# Patient Record
Sex: MALE | Race: WHITE | NOT HISPANIC OR LATINO
[De-identification: names, ages, dates, MRNs, and addresses within clinical notes are randomized per-mention and may not be internally consistent; named-entity substitution may affect disease eponyms.]

---

## 2017-10-09 ENCOUNTER — APPOINTMENT (OUTPATIENT)
Dept: ORTHOPEDIC SURGERY | Facility: CLINIC | Age: 72
End: 2017-10-09
Payer: COMMERCIAL

## 2017-10-09 VITALS
BODY MASS INDEX: 27.83 KG/M2 | HEART RATE: 58 BPM | SYSTOLIC BLOOD PRESSURE: 167 MMHG | HEIGHT: 73 IN | WEIGHT: 210 LBS | DIASTOLIC BLOOD PRESSURE: 85 MMHG

## 2017-10-09 PROCEDURE — 99214 OFFICE O/P EST MOD 30 MIN: CPT

## 2017-10-09 PROCEDURE — 99204 OFFICE O/P NEW MOD 45 MIN: CPT

## 2017-10-16 ENCOUNTER — APPOINTMENT (OUTPATIENT)
Dept: ORTHOPEDIC SURGERY | Facility: CLINIC | Age: 72
End: 2017-10-16
Payer: COMMERCIAL

## 2017-10-16 VITALS
HEART RATE: 54 BPM | BODY MASS INDEX: 31.21 KG/M2 | WEIGHT: 218 LBS | DIASTOLIC BLOOD PRESSURE: 86 MMHG | SYSTOLIC BLOOD PRESSURE: 172 MMHG | HEIGHT: 70 IN

## 2017-10-16 DIAGNOSIS — M51.26 OTHER INTERVERTEBRAL DISC DISPLACEMENT, LUMBAR REGION: ICD-10-CM

## 2017-10-16 PROCEDURE — 99213 OFFICE O/P EST LOW 20 MIN: CPT

## 2018-12-13 ENCOUNTER — APPOINTMENT (OUTPATIENT)
Dept: ORTHOPEDIC SURGERY | Facility: CLINIC | Age: 73
End: 2018-12-13
Payer: COMMERCIAL

## 2018-12-13 VITALS
WEIGHT: 224 LBS | BODY MASS INDEX: 32.07 KG/M2 | HEART RATE: 59 BPM | HEIGHT: 70 IN | DIASTOLIC BLOOD PRESSURE: 64 MMHG | SYSTOLIC BLOOD PRESSURE: 141 MMHG

## 2018-12-13 PROCEDURE — 73562 X-RAY EXAM OF KNEE 3: CPT | Mod: RT

## 2018-12-13 PROCEDURE — 20610 DRAIN/INJ JOINT/BURSA W/O US: CPT | Mod: RT

## 2018-12-13 PROCEDURE — 99214 OFFICE O/P EST MOD 30 MIN: CPT | Mod: 25

## 2018-12-28 ENCOUNTER — CHART COPY (OUTPATIENT)
Age: 73
End: 2018-12-28

## 2019-08-26 ENCOUNTER — APPOINTMENT (OUTPATIENT)
Dept: NEUROLOGY | Facility: CLINIC | Age: 74
End: 2019-08-26
Payer: COMMERCIAL

## 2019-08-26 VITALS
HEART RATE: 60 BPM | HEIGHT: 70 IN | OXYGEN SATURATION: 98 % | DIASTOLIC BLOOD PRESSURE: 70 MMHG | SYSTOLIC BLOOD PRESSURE: 140 MMHG

## 2019-08-26 DIAGNOSIS — H81.4 VERTIGO OF CENTRAL ORIGIN: ICD-10-CM

## 2019-08-26 PROCEDURE — 99203 OFFICE O/P NEW LOW 30 MIN: CPT

## 2019-08-26 NOTE — HISTORY OF PRESENT ILLNESS
[FreeTextEntry1] : &4 year old male with unsteady gait and neck and low back discomfort \par \par  Tendency to fall - s/p multiple otho proceeders -\par  Hear ruled no to be the cause

## 2019-08-26 NOTE — PHYSICAL EXAM
[General Appearance - Alert] : alert [General Appearance - In No Acute Distress] : in no acute distress [Affect] : the affect was normal [Impaired Insight] : insight and judgment were intact [Oriented To Time, Place, And Person] : oriented to person, place, and time [Person] : oriented to person [Place] : oriented to place [Concentration Intact] : normal concentrating ability [Visual Intact] : visual attention was ~T not ~L decreased [Time] : oriented to time [Naming Objects] : no difficulty naming common objects [Repeating Phrases] : no difficulty repeating a phrase [Fluency] : fluency intact [Writing A Sentence] : no difficulty writing a sentence [Comprehension] : comprehension intact [Past History] : adequate knowledge of personal past history [Reading] : reading intact [Cranial Nerves Oculomotor (III)] : extraocular motion intact [Cranial Nerves Trigeminal (V)] : facial sensation intact symmetrically [Cranial Nerves Optic (II)] : visual acuity intact bilaterally,  visual fields full to confrontation, pupils equal round and reactive to light [Cranial Nerves Facial (VII)] : face symmetrical [Cranial Nerves Vestibulocochlear (VIII)] : hearing was intact bilaterally [Cranial Nerves Accessory (XI - Cranial And Spinal)] : head turning and shoulder shrug symmetric [Cranial Nerves Glossopharyngeal (IX)] : tongue and palate midline [Cranial Nerves Hypoglossal (XII)] : there was no tongue deviation with protrusion [No Muscle Atrophy] : normal bulk in all four extremities [Motor Strength] : muscle strength was normal in all four extremities [Sensation Tactile Decrease] : light touch was intact [Past-pointing] : there was no past-pointing [Balance] : balance was intact [Tremor] : no tremor present [Plantar Reflex Right Only] : normal on the right [2+] : Ankle jerk left 2+ [Plantar Reflex Left Only] : normal on the left [Abnormal Walk] : normal gait [Nail Clubbing] : no clubbing  or cyanosis of the fingernails [FreeTextEntry1] : Abnormal - unstady gait [Motor Tone] : muscle strength and tone were normal [Musculoskeletal - Swelling] : no joint swelling seen

## 2019-09-26 ENCOUNTER — APPOINTMENT (OUTPATIENT)
Dept: OTOLARYNGOLOGY | Facility: CLINIC | Age: 74
End: 2019-09-26
Payer: COMMERCIAL

## 2019-09-26 VITALS
SYSTOLIC BLOOD PRESSURE: 149 MMHG | TEMPERATURE: 97.6 F | HEART RATE: 65 BPM | OXYGEN SATURATION: 97 % | DIASTOLIC BLOOD PRESSURE: 71 MMHG

## 2019-09-26 VITALS — WEIGHT: 212 LBS | BODY MASS INDEX: 29.68 KG/M2 | HEIGHT: 71 IN

## 2019-09-26 DIAGNOSIS — H61.23 IMPACTED CERUMEN, BILATERAL: ICD-10-CM

## 2019-09-26 DIAGNOSIS — Z87.891 PERSONAL HISTORY OF NICOTINE DEPENDENCE: ICD-10-CM

## 2019-09-26 DIAGNOSIS — Z78.9 OTHER SPECIFIED HEALTH STATUS: ICD-10-CM

## 2019-09-26 DIAGNOSIS — H81.43 VERTIGO OF CENTRAL ORIGIN, BILATERAL: ICD-10-CM

## 2019-09-26 PROCEDURE — 99204 OFFICE O/P NEW MOD 45 MIN: CPT | Mod: 25

## 2019-09-26 PROCEDURE — 69210 REMOVE IMPACTED EAR WAX UNI: CPT

## 2019-09-26 NOTE — PHYSICAL EXAM
[de-identified] : b copious cerumen imapction rmeoved atrauamicllyu with suciton [Midline] : trachea located in midline position [] : Reddick-Hallpike test is negative [Normal] : no rashes [de-identified] : gait steady

## 2019-09-26 NOTE — PROCEDURE
[Cerumen Impaction] : Cerumen Impaction [Same] : same as the Pre Op Dx. [] : Removal of Cerumen [FreeTextEntry6] : b copious cerumen imapction removed atrauamtically with suction

## 2019-09-26 NOTE — DATA REVIEWED
[de-identified] : cspine mri extensive disc dz and spinal stenosis\par we called for brain mri report- age related changes.

## 2019-09-26 NOTE — HISTORY OF PRESENT ILLNESS
[de-identified] : 73 yo man referred by Dr Olvera with 2.5 yr h/o disequilibrium - it is aggravated by positional change. denies true spinning. He has ambulatory difficulties and difficulties sitting. He is a hearing aid user and feels b ears are plugged as well. He has had cspine and brain mri and is here to review. sx are 8/10 in severity. nonsmoker. no fh relevant to cc.

## 2019-10-02 ENCOUNTER — APPOINTMENT (OUTPATIENT)
Dept: OTOLARYNGOLOGY | Facility: CLINIC | Age: 74
End: 2019-10-02
Payer: COMMERCIAL

## 2019-10-02 VITALS
SYSTOLIC BLOOD PRESSURE: 144 MMHG | DIASTOLIC BLOOD PRESSURE: 82 MMHG | TEMPERATURE: 98.3 F | OXYGEN SATURATION: 98 % | HEART RATE: 52 BPM

## 2019-10-02 PROCEDURE — 31231 NASAL ENDOSCOPY DX: CPT

## 2019-10-02 PROCEDURE — 92537 CALORIC VSTBLR TEST W/REC: CPT

## 2019-10-02 PROCEDURE — 99214 OFFICE O/P EST MOD 30 MIN: CPT | Mod: 25

## 2019-10-02 PROCEDURE — 92557 COMPREHENSIVE HEARING TEST: CPT

## 2019-10-02 PROCEDURE — 92540 BASIC VESTIBULAR EVALUATION: CPT

## 2019-10-02 PROCEDURE — 92550 TYMPANOMETRY & REFLEX THRESH: CPT

## 2019-10-03 NOTE — DATA REVIEWED
[de-identified] :  b hf snhlor a mild conductive componetn r type c tymp; vng cw aging calorics ok

## 2019-10-03 NOTE — HISTORY OF PRESENT ILLNESS
[de-identified] : 73 yo man with significant cervical spine disease and vertigo when he moves his neck referred by Dr Olvera to r/o otologic source. He has appt coming up with Dr Ba in neurosurgery. He c/o r otalgia ever since he had vng earlier. it is i mild-moderate dull fullness.

## 2019-10-03 NOTE — PROCEDURE
[Topical Lidocaine] : topical lidocaine [Oxymetazoline HCl] : oxymetazoline HCl [Flexible Endoscope] : examined with the flexible endoscope [Serial Number: ___] : Serial Number: [unfilled] [FreeTextEntry6] : mild mucosal engorgement no et or npx mass

## 2019-10-15 ENCOUNTER — APPOINTMENT (OUTPATIENT)
Dept: SPINE | Facility: CLINIC | Age: 74
End: 2019-10-15
Payer: COMMERCIAL

## 2019-10-15 VITALS
BODY MASS INDEX: 30.1 KG/M2 | HEIGHT: 71 IN | SYSTOLIC BLOOD PRESSURE: 152 MMHG | DIASTOLIC BLOOD PRESSURE: 74 MMHG | RESPIRATION RATE: 18 BRPM | HEART RATE: 61 BPM | OXYGEN SATURATION: 96 % | WEIGHT: 215 LBS

## 2019-10-15 DIAGNOSIS — R26.81 UNSTEADINESS ON FEET: ICD-10-CM

## 2019-10-15 DIAGNOSIS — Z87.39 PERSONAL HISTORY OF OTHER DISEASES OF THE MUSCULOSKELETAL SYSTEM AND CONNECTIVE TISSUE: ICD-10-CM

## 2019-10-15 DIAGNOSIS — Z82.49 FAMILY HISTORY OF ISCHEMIC HEART DISEASE AND OTHER DISEASES OF THE CIRCULATORY SYSTEM: ICD-10-CM

## 2019-10-15 DIAGNOSIS — Z82.61 FAMILY HISTORY OF ARTHRITIS: ICD-10-CM

## 2019-10-15 DIAGNOSIS — R29.898 OTHER SYMPTOMS AND SIGNS INVOLVING THE MUSCULOSKELETAL SYSTEM: ICD-10-CM

## 2019-10-15 PROCEDURE — 99204 OFFICE O/P NEW MOD 45 MIN: CPT

## 2019-10-18 ENCOUNTER — APPOINTMENT (OUTPATIENT)
Dept: OTOLARYNGOLOGY | Facility: CLINIC | Age: 74
End: 2019-10-18
Payer: COMMERCIAL

## 2019-10-18 VITALS
RESPIRATION RATE: 17 BRPM | DIASTOLIC BLOOD PRESSURE: 84 MMHG | HEART RATE: 57 BPM | OXYGEN SATURATION: 96 % | SYSTOLIC BLOOD PRESSURE: 161 MMHG | TEMPERATURE: 97.6 F

## 2019-10-18 DIAGNOSIS — H69.81 OTHER SPECIFIED DISORDERS OF EUSTACHIAN TUBE, RIGHT EAR: ICD-10-CM

## 2019-10-18 DIAGNOSIS — R42 DIZZINESS AND GIDDINESS: ICD-10-CM

## 2019-10-18 PROBLEM — Z82.61 FAMILY HISTORY OF ARTHRITIS: Status: ACTIVE | Noted: 2019-10-16

## 2019-10-18 PROBLEM — R26.81 GAIT INSTABILITY: Status: ACTIVE | Noted: 2019-10-18

## 2019-10-18 PROBLEM — Z82.49 FAMILY HISTORY OF HYPERTENSION: Status: ACTIVE | Noted: 2019-10-16

## 2019-10-18 PROBLEM — Z82.49 FAMILY HISTORY OF CARDIAC DISORDER: Status: ACTIVE | Noted: 2019-10-16

## 2019-10-18 PROCEDURE — 92557 COMPREHENSIVE HEARING TEST: CPT

## 2019-10-18 PROCEDURE — 92550 TYMPANOMETRY & REFLEX THRESH: CPT

## 2019-10-18 PROCEDURE — 99214 OFFICE O/P EST MOD 30 MIN: CPT

## 2019-10-18 RX ORDER — CHOLECALCIFEROL (VITAMIN D3) 25 MCG
TABLET ORAL
Refills: 0 | Status: DISCONTINUED | COMMUNITY

## 2019-10-18 RX ORDER — CALCIUM CARBONATE 500 MG/1
TABLET, CHEWABLE ORAL
Refills: 0 | Status: DISCONTINUED | COMMUNITY

## 2019-10-18 RX ORDER — AZATHIOPRINE 50 MG/1
50 TABLET ORAL
Refills: 0 | Status: DISCONTINUED | COMMUNITY

## 2019-10-18 RX ORDER — CELECOXIB 200 MG/1
200 CAPSULE ORAL
Refills: 0 | Status: DISCONTINUED | COMMUNITY

## 2019-10-18 RX ORDER — LATANOPROST/PF 0.005 %
0.01 DROPS OPHTHALMIC (EYE)
Refills: 0 | Status: DISCONTINUED | COMMUNITY

## 2019-10-18 RX ORDER — ROSUVASTATIN CALCIUM 5 MG/1
5 TABLET, FILM COATED ORAL
Refills: 0 | Status: DISCONTINUED | COMMUNITY

## 2019-10-18 RX ORDER — MULTIVIT-MIN/IRON/FOLIC ACID/K 18-600-40
CAPSULE ORAL
Refills: 0 | Status: DISCONTINUED | COMMUNITY

## 2019-10-18 RX ORDER — RAMIPRIL 1.25 MG/1
1.25 CAPSULE ORAL
Refills: 0 | Status: DISCONTINUED | COMMUNITY

## 2019-10-18 RX ORDER — RIVAROXABAN 10 MG/1
10 TABLET, FILM COATED ORAL
Refills: 0 | Status: DISCONTINUED | COMMUNITY

## 2019-10-18 RX ORDER — LORATADINE 10 MG
17 TABLET,DISINTEGRATING ORAL
Refills: 0 | Status: DISCONTINUED | COMMUNITY

## 2019-10-18 NOTE — HISTORY OF PRESENT ILLNESS
[de-identified] : followup 75 yo man with h/o etd and cervical vertigo - he took flonase singulair for the etd and feels no better r ear still feels blocked and wants to know what he can do for it. -f.s.c no drainage. Saw Dr Ba for cspine findings on imaging and was told not clear enough so he is getting new. Nonsmoke.r

## 2019-10-28 ENCOUNTER — FORM ENCOUNTER (OUTPATIENT)
Age: 74
End: 2019-10-28

## 2019-10-28 ENCOUNTER — APPOINTMENT (OUTPATIENT)
Dept: NEUROLOGY | Facility: CLINIC | Age: 74
End: 2019-10-28

## 2019-10-29 ENCOUNTER — OUTPATIENT (OUTPATIENT)
Dept: OUTPATIENT SERVICES | Facility: HOSPITAL | Age: 74
LOS: 1 days | End: 2019-10-29
Payer: COMMERCIAL

## 2019-10-29 DIAGNOSIS — Z98.89 OTHER SPECIFIED POSTPROCEDURAL STATES: Chronic | ICD-10-CM

## 2019-10-29 PROCEDURE — 72084 X-RAY EXAM ENTIRE SPI 6/> VW: CPT | Mod: 26

## 2019-10-29 PROCEDURE — 72082 X-RAY EXAM ENTIRE SPI 2/3 VW: CPT

## 2019-10-29 PROCEDURE — 72050 X-RAY EXAM NECK SPINE 4/5VWS: CPT

## 2019-10-29 PROCEDURE — 72110 X-RAY EXAM L-2 SPINE 4/>VWS: CPT

## 2019-10-31 ENCOUNTER — APPOINTMENT (OUTPATIENT)
Dept: CT IMAGING | Facility: HOSPITAL | Age: 74
End: 2019-10-31
Payer: COMMERCIAL

## 2019-10-31 ENCOUNTER — OUTPATIENT (OUTPATIENT)
Dept: OUTPATIENT SERVICES | Facility: HOSPITAL | Age: 74
LOS: 1 days | End: 2019-10-31
Payer: COMMERCIAL

## 2019-10-31 DIAGNOSIS — G95.20 UNSPECIFIED CORD COMPRESSION: ICD-10-CM

## 2019-10-31 DIAGNOSIS — R26.9 UNSPECIFIED ABNORMALITIES OF GAIT AND MOBILITY: ICD-10-CM

## 2019-10-31 DIAGNOSIS — Z98.89 OTHER SPECIFIED POSTPROCEDURAL STATES: Chronic | ICD-10-CM

## 2019-10-31 PROCEDURE — 72125 CT NECK SPINE W/O DYE: CPT | Mod: 26

## 2019-10-31 PROCEDURE — 72125 CT NECK SPINE W/O DYE: CPT

## 2019-11-05 PROBLEM — H81.4 VERTIGO OF CENTRAL ORIGIN, UNSPECIFIED LATERALITY: Status: ACTIVE | Noted: 2019-08-26

## 2019-11-06 ENCOUNTER — APPOINTMENT (OUTPATIENT)
Dept: SPINE | Facility: CLINIC | Age: 74
End: 2019-11-06
Payer: COMMERCIAL

## 2019-11-06 VITALS
HEART RATE: 65 BPM | DIASTOLIC BLOOD PRESSURE: 73 MMHG | OXYGEN SATURATION: 96 % | SYSTOLIC BLOOD PRESSURE: 134 MMHG | RESPIRATION RATE: 18 BRPM | HEIGHT: 71 IN | BODY MASS INDEX: 30.1 KG/M2 | WEIGHT: 215 LBS

## 2019-11-06 DIAGNOSIS — M43.8X9 OTHER SPECIFIED DEFORMING DORSOPATHIES, SITE UNSPECIFIED: ICD-10-CM

## 2019-11-06 DIAGNOSIS — R42 DIZZINESS AND GIDDINESS: ICD-10-CM

## 2019-11-06 PROCEDURE — 99214 OFFICE O/P EST MOD 30 MIN: CPT

## 2019-11-06 NOTE — REVIEW OF SYSTEMS
[Dizziness] : dizziness [Difficulty Walking] : difficulty walking [Limb Pain] : limb pain [Negative] : Integumentary

## 2019-11-06 NOTE — PHYSICAL EXAM
[General Appearance - Alert] : alert [General Appearance - Well Nourished] : well nourished [General Appearance - Well-Appearing] : healthy appearing [Person] : oriented to person [Place] : oriented to place [Time] : oriented to time [Limited Balance] : the patient's balance was impaired [Neck Appearance] : the appearance of the neck was normal [] : no respiratory distress [Exaggerated Use Of Accessory Muscles For Inspiration] : no accessory muscle use [Edema] : there was no peripheral edema [Ataxic, Wide-Based] : wide-based and ataxic

## 2019-11-07 NOTE — ASSESSMENT
[FreeTextEntry1] : Noted severe cervical spinal cord compression and would need surgical intervention \par Patient verbalized concerns regarding surgical intervention due to cardiac history\par Discussed with patient the need for a diagnostic MRI cervical spine w/o contrast to guide treatment planning. Patient to scheduled ordered imaging study approval given\par Will also present at [facility] Spine Conference\par Continued treatment planning when all needed imaging studies are completed\par

## 2019-11-07 NOTE — REASON FOR VISIT
[Follow-Up: _____] : a [unfilled] follow-up visit [FreeTextEntry1] : TODAY:\par Patient returns to discuss completed imaging studies\par Reports he has not completed the ordered MRI due to his inability to lay on his back\par He thinks his gait has been worsening since his initial visit and had to stop swimming.\par On his last swim his dizziness lasted for an extended period\par He now has more difficulty navigating his apartment due to his balance impairment\par He continues to utilize a cane\par He is w/o any new focal deficits

## 2019-11-07 NOTE — DATA REVIEWED
[de-identified] : RICHI CALLES   Report Date: 01-Nov-2019 08:33.00 \par Patient ID: [id number] ([facility]), [id number] (EPI)  Accession No.: [id number] \par Patient Birth Date: 1945  Report Status: F \par Referring Physician: [id number] DOCTOR UNKNOWN   Reason For Study:  \par \par \par \par \par \par EXAM: CT CERVICAL SPINE \par \par \par PROCEDURE DATE: 10/31/2019 \par \par \par \par INTERPRETATION: CLINICAL STATEMENT: Pain. \par \par TECHNIQUE: CT of the cervical spine was performed without contrast. 3-D MIP \par images obtained \par \par COMPARISON: None. \par \par FINDINGS: \par There is mild reversal of cervical lordosis. Chronic mild compression \par deformities of C5 and C6 vertebral bodies. \par \par Grade 1 anterolisthesis of C3 on C4. Grade 1 anterolisthesis of C7 on T1 \par \par There is no prevertebral soft tissue swelling. The odontoid is intact. \par \par Evaluation of the spinal canal is limited on a CT exam. Multilevel \par osteophytes noted. Mild disc space narrowing C2-3. Moderate disc space \par narrowing C3-4. Moderate to severe disc space during C4-5. Severe disc space \par narrowing C5-6, C6-7. Moderate to severe disc space narrowing C7-T1. Severe \par disc space narrowing T1-T2. \par \par C2-3: Disc osteophyte complex and facet/uncovertebral hypertrophy noted \par resulting in mild spinal canal stenosis. Mild right neural foraminal \par narrowing. \par \par C3-4: Disc osteophyte complex and facet/uncovertebral hypertrophy noted \par resulting in mild-to-moderate spinal canal stenosis. Moderate to severe \par bilateral neural foraminal narrowing. \par \par C4-5: Disc osteophyte complex and facet/uncovertebral hypertrophy noted \par resulting in moderate spinal canal stenosis. Moderate to severe right, \par severe left neural foraminal narrowing. \par \par C5-C6: Disc osteophyte complex and facet/uncovertebral hypertrophy noted \par resulting in severe spinal canal stenosis and compression of the cord. \par Severe bilateral neural foraminal narrowing. \par \par C6-7: Disc osteophyte complex and facet/uncovertebral hypertrophy noted \par resulting in severe spinal canal stenosis. Compression of the ventral cord. \par Severe bilateral neural foraminal narrowing. \par \par C7-T1: Disc osteophyte complex and facet/uncovertebral hypertrophy noted. \par Evaluation of spinal canal limited due to artifacts. Moderate left and mild \par right neural foraminal narrowing \par \par \par \par 1.1 cm nodule noted in the isthmus of the thyroid gland. \par \par IMPRESSION: \par Extensive multilevel degenerative changes with severe spinal canal stenosis \par and compression of the spinal cord at C5-C6 and C6-7 on a degenerative \par basis. Multilevel neural foraminal narrowing as described above \par \par \par \par \par \par \par \par MARLENE HERRERA M.D., ATTENDING RADIOLOGIST \par This document has been electronically signed. Nov 1 2019 8:33AM \par \par \par \par  \par

## 2019-11-08 ENCOUNTER — OUTPATIENT (OUTPATIENT)
Dept: OUTPATIENT SERVICES | Facility: HOSPITAL | Age: 74
LOS: 1 days | End: 2019-11-08
Payer: COMMERCIAL

## 2019-11-08 ENCOUNTER — APPOINTMENT (OUTPATIENT)
Dept: MRI IMAGING | Facility: HOSPITAL | Age: 74
End: 2019-11-08
Payer: COMMERCIAL

## 2019-11-08 DIAGNOSIS — R42 DIZZINESS AND GIDDINESS: ICD-10-CM

## 2019-11-08 DIAGNOSIS — Z98.89 OTHER SPECIFIED POSTPROCEDURAL STATES: Chronic | ICD-10-CM

## 2019-11-08 PROCEDURE — 72141 MRI NECK SPINE W/O DYE: CPT

## 2019-11-08 PROCEDURE — 72141 MRI NECK SPINE W/O DYE: CPT | Mod: 26

## 2019-11-20 ENCOUNTER — APPOINTMENT (OUTPATIENT)
Dept: SPINE | Facility: CLINIC | Age: 74
End: 2019-11-20
Payer: COMMERCIAL

## 2019-11-20 VITALS
OXYGEN SATURATION: 98 % | HEIGHT: 71 IN | BODY MASS INDEX: 30.1 KG/M2 | HEART RATE: 68 BPM | RESPIRATION RATE: 18 BRPM | SYSTOLIC BLOOD PRESSURE: 158 MMHG | WEIGHT: 215 LBS | DIASTOLIC BLOOD PRESSURE: 88 MMHG

## 2019-11-20 DIAGNOSIS — M43.10 SPONDYLOLISTHESIS, SITE UNSPECIFIED: ICD-10-CM

## 2019-11-20 DIAGNOSIS — M47.812 SPONDYLOSIS W/OUT MYELOPATHY OR RADICULOPATHY, CERVICAL REGION: ICD-10-CM

## 2019-11-20 PROCEDURE — 99215 OFFICE O/P EST HI 40 MIN: CPT | Mod: 57

## 2019-12-03 ENCOUNTER — APPOINTMENT (OUTPATIENT)
Dept: ORTHOPEDIC SURGERY | Facility: CLINIC | Age: 74
End: 2019-12-03
Payer: COMMERCIAL

## 2019-12-03 VITALS
HEIGHT: 71 IN | BODY MASS INDEX: 30.1 KG/M2 | SYSTOLIC BLOOD PRESSURE: 128 MMHG | HEART RATE: 63 BPM | OXYGEN SATURATION: 98 % | WEIGHT: 215 LBS | DIASTOLIC BLOOD PRESSURE: 75 MMHG

## 2019-12-03 DIAGNOSIS — G95.20 UNSPECIFIED CORD COMPRESSION: ICD-10-CM

## 2019-12-03 DIAGNOSIS — M48.02 SPINAL STENOSIS, CERVICAL REGION: ICD-10-CM

## 2019-12-03 DIAGNOSIS — Z01.818 ENCOUNTER FOR OTHER PREPROCEDURAL EXAMINATION: ICD-10-CM

## 2019-12-03 PROCEDURE — 99215 OFFICE O/P EST HI 40 MIN: CPT

## 2019-12-11 ENCOUNTER — APPOINTMENT (OUTPATIENT)
Dept: ORTHOPEDIC SURGERY | Facility: CLINIC | Age: 74
End: 2019-12-11
Payer: COMMERCIAL

## 2019-12-11 VITALS — WEIGHT: 215 LBS | BODY MASS INDEX: 30.1 KG/M2 | HEIGHT: 71 IN

## 2019-12-11 PROCEDURE — 99214 OFFICE O/P EST MOD 30 MIN: CPT

## 2019-12-17 PROBLEM — Z01.818 PREOPERATIVE TESTING: Status: ACTIVE | Noted: 2019-12-17

## 2019-12-18 ENCOUNTER — OTHER (OUTPATIENT)
Age: 74
End: 2019-12-18

## 2019-12-23 ENCOUNTER — OUTPATIENT (OUTPATIENT)
Dept: OUTPATIENT SERVICES | Facility: HOSPITAL | Age: 74
LOS: 1 days | End: 2019-12-23
Payer: COMMERCIAL

## 2019-12-23 DIAGNOSIS — Z22.321 CARRIER OR SUSPECTED CARRIER OF METHICILLIN SUSCEPTIBLE STAPHYLOCOCCUS AUREUS: ICD-10-CM

## 2019-12-23 DIAGNOSIS — Z98.89 OTHER SPECIFIED POSTPROCEDURAL STATES: Chronic | ICD-10-CM

## 2019-12-23 PROCEDURE — 87641 MR-STAPH DNA AMP PROBE: CPT

## 2020-01-01 ENCOUNTER — FORM ENCOUNTER (OUTPATIENT)
Age: 75
End: 2020-01-01

## 2020-01-02 ENCOUNTER — OUTPATIENT (OUTPATIENT)
Dept: OUTPATIENT SERVICES | Facility: HOSPITAL | Age: 75
LOS: 1 days | End: 2020-01-02
Payer: COMMERCIAL

## 2020-01-02 ENCOUNTER — APPOINTMENT (OUTPATIENT)
Dept: RADIOLOGY | Facility: IMAGING CENTER | Age: 75
End: 2020-01-02
Payer: COMMERCIAL

## 2020-01-02 DIAGNOSIS — Z98.89 OTHER SPECIFIED POSTPROCEDURAL STATES: Chronic | ICD-10-CM

## 2020-01-02 DIAGNOSIS — Z00.8 ENCOUNTER FOR OTHER GENERAL EXAMINATION: ICD-10-CM

## 2020-01-02 PROCEDURE — 77080 DXA BONE DENSITY AXIAL: CPT | Mod: 26

## 2020-01-02 PROCEDURE — 77080 DXA BONE DENSITY AXIAL: CPT

## 2020-01-09 DIAGNOSIS — Z86.718 PERSONAL HISTORY OF OTHER VENOUS THROMBOSIS AND EMBOLISM: ICD-10-CM

## 2020-01-09 PROBLEM — G95.20 CERVICAL SPINAL CORD COMPRESSION: Status: ACTIVE | Noted: 2019-10-15

## 2020-01-09 PROBLEM — M48.02 CERVICAL STENOSIS OF SPINE: Status: ACTIVE | Noted: 2019-11-06

## 2020-01-13 NOTE — ASU PATIENT PROFILE, ADULT - PMH
BBB (bundle branch block)  right  BPH (benign prostatic hyperplasia)    Cardiac angina    Carotid stenosis, right    Diabetes    Hypertension    Iliac aneurysm    Neuropathy    LISA (obstructive sleep apnea)    Vasovagal syndrome

## 2020-01-13 NOTE — PHYSICAL EXAM
[de-identified] : Physical Exam:\par \par General: patient is well developed, well nourished, in no acute \par distress, alert and oriented x 3. \par \par Mood and affect: normal\par \par Respiratory: no respiratory distress noted\par \par Skin: no scars over spine, skin intact, no erythema, increased warmth\par \par Alignment:The spine is well compensated in the coronal and sagittal plane. There is no asymmetry on Aguilar Forward Bend Test.\par \par Gait: wide based, ataxic gait; The patient is unable to toe walk and heel walk. The patient is able to tandem walk with significant difficulty.\par \par Palpation: no tenderness to palpation midline along spine or paraspinal region\par \par Range of motion: Cervical and Lumbar spine ROM is full\par \par Neurologic Exam:\par Motor: Manual Muscle testing in the upper and lower extremities is 5 out of 5 in all muscle groups. There is no evidence of muscular atrophy in the upper extremities. Sensory: Sensation to light touch is grossly intact in the upper and lower extremities\par \par Reflexes: DTR are present and symmetric throughout, negative nino bilaterally, negative inverted radial reflex bilaterally, no clonus, plantar responses are flexor\par \par Special tests: Spurlings sign absent. Lhermitte's sign is absent. Straight Leg Raise Negative, Cross Straight Leg Raise Negative, SHERRIE Test Negative\par \par Hip Exam: Full painless ROM of bilateral hips\par \par Vascular: Examination of the peripheral vascular system demonstrates no evidence of congestion or edema. no lymphedema bilateral lower extremities, pulses are present and symmetric in both lower extremities.\par \par \par  [de-identified] : MRI cervical 11/2019: multilevel degenerative changes, most pronounced at C5/C6 with disc bulge causing severe central canal stenosis, cord compression, likely myelomalacia at this level\par \par CT cervical 10/2019: severe multilevel degenerative changes, severe central canal stenosis and cord compression C5/C6 and C6/C7, severe bilateral foraminal stenosis, no acute fractures\par \par XR cervical 12/3/19: multilevel degenerative changes, most pronounced at C5/C6 and C6/C7; 1mm stable retrolisthesis C5 on C6, loss of cervical lordosis, no acute fractures

## 2020-01-13 NOTE — HISTORY OF PRESENT ILLNESS
[de-identified] : Mr. STODDARD is a very pleasant 75 year old male who complains of progressively poor balance for approximately 6 months. He reports mild non radiating neck pain. Long standing history of severe non radiating low back pain, has had many steroid injections. Ambulating with a walker due to gait instability. Also reports mild progressive bilateral hand dexterity issues.\par \par Has had extensive ENT work up, including vestibular work up. \par \par There no sensory loss in the arms or legs\par The patent no difficulty with urination.\par \par The patient no history of previous spine surgery.\par \par The patient has no history of unexpected weight loss, no history of active cancer, no history bladder or bowel dysfunction, no night pain, no fevers or chills.\par \par The past medical history, surgical history, family history, allergies, medications, 10+ point review of systems, family history and social history were reviewed and non contributory.\par \par

## 2020-01-13 NOTE — ASU PATIENT PROFILE, ADULT - PSH
History of coronary artery stent placement  x 5 stents  History of total knee replacement, left    S/P angioplasty History of carpal tunnel release of both wrists    History of coronary artery stent placement  x 5 stents  History of strabismus surgery    History of total knee replacement, left    S/P angioplasty

## 2020-01-13 NOTE — DISCUSSION/SUMMARY
[de-identified] : Discussed the results of the patient's history, physical exam, and imaging. Mr. Richardson has been suffering from progressive gait instability for 6 months. MRI cervical shows multilevel degenerative changes, most pronounced at C5/C6 with disc bulge causing severe central canal stenosis, cord compression, likely myelomalacia at this level. The patient is a candidate for C6 corpectomy, possible C5 corpectomy, C4-C7 vs C5-C7 anterior cervical fusion, plating, cage. May need additional posterior cervical spinal fusion with instrumentation. Further non operative treatment would include no surgery.  Risks, benefits, alternatives were discussed with the patient at great length, including but not limited to, bleeding, infection, dysphagia, odynophagia, recurrent laryngeal injury, hypoglossal nerve injury, dural tear, worsening neurologic status, persistent neurologic deficit, pseudoarthrosis, hardware migration/failure, and the need for further surgery.  The patient asked a number of cogent questions.  All questions were answered.  The patient demonstrated understanding of the risks, benefits, and alternatives.  The patient has elected to consider surgery. Discussed with patient in detail risk of persistent and worsening neurologic deficit if he delays surgical intervention. Will need DEXA scan, need to come off xarelto for 5-7 days preop. \par \par

## 2020-01-14 ENCOUNTER — OUTPATIENT (OUTPATIENT)
Dept: INPATIENT UNIT | Facility: HOSPITAL | Age: 75
LOS: 1 days | Discharge: HOME | End: 2020-01-14
Payer: COMMERCIAL

## 2020-01-14 ENCOUNTER — APPOINTMENT (OUTPATIENT)
Dept: VASCULAR SURGERY | Facility: HOSPITAL | Age: 75
End: 2020-01-14

## 2020-01-14 VITALS
HEART RATE: 66 BPM | RESPIRATION RATE: 16 BRPM | SYSTOLIC BLOOD PRESSURE: 148 MMHG | DIASTOLIC BLOOD PRESSURE: 80 MMHG | OXYGEN SATURATION: 100 %

## 2020-01-14 VITALS
HEART RATE: 57 BPM | OXYGEN SATURATION: 97 % | RESPIRATION RATE: 16 BRPM | WEIGHT: 221.12 LBS | SYSTOLIC BLOOD PRESSURE: 148 MMHG | TEMPERATURE: 98 F | DIASTOLIC BLOOD PRESSURE: 77 MMHG | HEIGHT: 71 IN

## 2020-01-14 DIAGNOSIS — Z95.5 PRESENCE OF CORONARY ANGIOPLASTY IMPLANT AND GRAFT: Chronic | ICD-10-CM

## 2020-01-14 DIAGNOSIS — Z98.890 OTHER SPECIFIED POSTPROCEDURAL STATES: Chronic | ICD-10-CM

## 2020-01-14 DIAGNOSIS — Z98.89 OTHER SPECIFIED POSTPROCEDURAL STATES: Chronic | ICD-10-CM

## 2020-01-14 DIAGNOSIS — Z96.652 PRESENCE OF LEFT ARTIFICIAL KNEE JOINT: Chronic | ICD-10-CM

## 2020-01-14 PROCEDURE — 37191 INS ENDOVAS VENA CAVA FILTR: CPT

## 2020-01-14 PROCEDURE — 76000 FLUOROSCOPY <1 HR PHYS/QHP: CPT

## 2020-01-14 PROCEDURE — C1880: CPT

## 2020-01-14 PROCEDURE — 37191 INS ENDOVAS VENA CAVA FILTR: CPT | Mod: GC

## 2020-01-14 RX ORDER — ONDANSETRON 8 MG/1
4 TABLET, FILM COATED ORAL ONCE
Refills: 0 | Status: DISCONTINUED | OUTPATIENT
Start: 2020-01-14 | End: 2020-01-15

## 2020-01-14 RX ORDER — SODIUM CHLORIDE 9 MG/ML
1000 INJECTION, SOLUTION INTRAVENOUS
Refills: 0 | Status: DISCONTINUED | OUTPATIENT
Start: 2020-01-14 | End: 2020-01-15

## 2020-01-14 NOTE — BRIEF OPERATIVE NOTE - OPERATION/FINDINGS
Right groin micropuncture access. Venogram with patent IVC. IVC filter deployed without issue. Pressure held on puncture. Hemostasis achieved. groin soft without issue. Neurologically intact, palpable pulses bilaterally

## 2020-01-21 ENCOUNTER — EMERGENCY (EMERGENCY)
Facility: HOSPITAL | Age: 75
LOS: 1 days | Discharge: HOME | End: 2020-01-21
Attending: EMERGENCY MEDICINE
Payer: COMMERCIAL

## 2020-01-21 VITALS
HEART RATE: 59 BPM | OXYGEN SATURATION: 97 % | RESPIRATION RATE: 20 BRPM | TEMPERATURE: 98 F | DIASTOLIC BLOOD PRESSURE: 80 MMHG | SYSTOLIC BLOOD PRESSURE: 151 MMHG

## 2020-01-21 VITALS
SYSTOLIC BLOOD PRESSURE: 171 MMHG | OXYGEN SATURATION: 98 % | DIASTOLIC BLOOD PRESSURE: 90 MMHG | TEMPERATURE: 98 F | HEART RATE: 69 BPM | WEIGHT: 214.95 LBS | RESPIRATION RATE: 20 BRPM | HEIGHT: 71 IN

## 2020-01-21 DIAGNOSIS — Z98.890 OTHER SPECIFIED POSTPROCEDURAL STATES: Chronic | ICD-10-CM

## 2020-01-21 DIAGNOSIS — Z98.89 OTHER SPECIFIED POSTPROCEDURAL STATES: Chronic | ICD-10-CM

## 2020-01-21 DIAGNOSIS — Z96.652 PRESENCE OF LEFT ARTIFICIAL KNEE JOINT: Chronic | ICD-10-CM

## 2020-01-21 DIAGNOSIS — Z95.5 PRESENCE OF CORONARY ANGIOPLASTY IMPLANT AND GRAFT: Chronic | ICD-10-CM

## 2020-01-21 PROBLEM — N40.0 BENIGN PROSTATIC HYPERPLASIA WITHOUT LOWER URINARY TRACT SYMPTOMS: Chronic | Status: ACTIVE | Noted: 2020-01-13

## 2020-01-21 PROBLEM — R55 SYNCOPE AND COLLAPSE: Chronic | Status: ACTIVE | Noted: 2020-01-13

## 2020-01-21 PROBLEM — G47.33 OBSTRUCTIVE SLEEP APNEA (ADULT) (PEDIATRIC): Chronic | Status: ACTIVE | Noted: 2020-01-13

## 2020-01-21 PROBLEM — I45.4 NONSPECIFIC INTRAVENTRICULAR BLOCK: Chronic | Status: ACTIVE | Noted: 2020-01-13

## 2020-01-21 PROBLEM — I72.3 ANEURYSM OF ILIAC ARTERY: Chronic | Status: ACTIVE | Noted: 2020-01-13

## 2020-01-21 PROBLEM — G62.9 POLYNEUROPATHY, UNSPECIFIED: Chronic | Status: ACTIVE | Noted: 2020-01-13

## 2020-01-21 PROBLEM — I65.21 OCCLUSION AND STENOSIS OF RIGHT CAROTID ARTERY: Chronic | Status: ACTIVE | Noted: 2020-01-13

## 2020-01-21 LAB
ALBUMIN SERPL ELPH-MCNC: 3.4 G/DL — LOW (ref 3.5–5)
ALP SERPL-CCNC: 52 U/L — SIGNIFICANT CHANGE UP (ref 40–120)
ALT FLD-CCNC: 20 U/L DA — SIGNIFICANT CHANGE UP (ref 10–60)
ANION GAP SERPL CALC-SCNC: 3 MMOL/L — LOW (ref 5–17)
APPEARANCE UR: CLEAR — SIGNIFICANT CHANGE UP
AST SERPL-CCNC: 16 U/L — SIGNIFICANT CHANGE UP (ref 10–40)
BASOPHILS # BLD AUTO: 0.04 K/UL — SIGNIFICANT CHANGE UP (ref 0–0.2)
BASOPHILS NFR BLD AUTO: 1 % — SIGNIFICANT CHANGE UP (ref 0–2)
BILIRUB SERPL-MCNC: 0.4 MG/DL — SIGNIFICANT CHANGE UP (ref 0.2–1.2)
BILIRUB UR-MCNC: NEGATIVE — SIGNIFICANT CHANGE UP
BUN SERPL-MCNC: 26 MG/DL — HIGH (ref 7–18)
CALCIUM SERPL-MCNC: 8.9 MG/DL — SIGNIFICANT CHANGE UP (ref 8.4–10.5)
CHLORIDE SERPL-SCNC: 108 MMOL/L — SIGNIFICANT CHANGE UP (ref 96–108)
CO2 SERPL-SCNC: 27 MMOL/L — SIGNIFICANT CHANGE UP (ref 22–31)
COLOR SPEC: YELLOW — SIGNIFICANT CHANGE UP
CREAT SERPL-MCNC: 1.04 MG/DL — SIGNIFICANT CHANGE UP (ref 0.5–1.3)
DIFF PNL FLD: NEGATIVE — SIGNIFICANT CHANGE UP
EOSINOPHIL # BLD AUTO: 0.37 K/UL — SIGNIFICANT CHANGE UP (ref 0–0.5)
EOSINOPHIL NFR BLD AUTO: 9 % — HIGH (ref 0–6)
GLUCOSE SERPL-MCNC: 85 MG/DL — SIGNIFICANT CHANGE UP (ref 70–99)
GLUCOSE UR QL: NEGATIVE — SIGNIFICANT CHANGE UP
HCT VFR BLD CALC: 38.7 % — LOW (ref 39–50)
HGB BLD-MCNC: 12.8 G/DL — LOW (ref 13–17)
KETONES UR-MCNC: NEGATIVE — SIGNIFICANT CHANGE UP
LEUKOCYTE ESTERASE UR-ACNC: NEGATIVE — SIGNIFICANT CHANGE UP
LYMPHOCYTES # BLD AUTO: 0.21 K/UL — LOW (ref 1–3.3)
LYMPHOCYTES # BLD AUTO: 5 % — LOW (ref 13–44)
MCHC RBC-ENTMCNC: 32.7 PG — SIGNIFICANT CHANGE UP (ref 27–34)
MCHC RBC-ENTMCNC: 33.1 GM/DL — SIGNIFICANT CHANGE UP (ref 32–36)
MCV RBC AUTO: 98.7 FL — SIGNIFICANT CHANGE UP (ref 80–100)
MONOCYTES # BLD AUTO: 0.46 K/UL — SIGNIFICANT CHANGE UP (ref 0–0.9)
MONOCYTES NFR BLD AUTO: 11 % — SIGNIFICANT CHANGE UP (ref 2–14)
NEUTROPHILS # BLD AUTO: 3.08 K/UL — SIGNIFICANT CHANGE UP (ref 1.8–7.4)
NEUTROPHILS NFR BLD AUTO: 73 % — SIGNIFICANT CHANGE UP (ref 43–77)
NITRITE UR-MCNC: NEGATIVE — SIGNIFICANT CHANGE UP
PH UR: 7 — SIGNIFICANT CHANGE UP (ref 5–8)
PLATELET # BLD AUTO: 183 K/UL — SIGNIFICANT CHANGE UP (ref 150–400)
POTASSIUM SERPL-MCNC: 4.3 MMOL/L — SIGNIFICANT CHANGE UP (ref 3.5–5.3)
POTASSIUM SERPL-SCNC: 4.3 MMOL/L — SIGNIFICANT CHANGE UP (ref 3.5–5.3)
PROT SERPL-MCNC: 6.2 G/DL — SIGNIFICANT CHANGE UP (ref 6–8.3)
PROT UR-MCNC: NEGATIVE — SIGNIFICANT CHANGE UP
RBC # BLD: 3.92 M/UL — LOW (ref 4.2–5.8)
RBC # FLD: 12.7 % — SIGNIFICANT CHANGE UP (ref 10.3–14.5)
SODIUM SERPL-SCNC: 138 MMOL/L — SIGNIFICANT CHANGE UP (ref 135–145)
SP GR SPEC: 1.01 — SIGNIFICANT CHANGE UP (ref 1.01–1.02)
UROBILINOGEN FLD QL: NEGATIVE — SIGNIFICANT CHANGE UP
WBC # BLD: 4.16 K/UL — SIGNIFICANT CHANGE UP (ref 3.8–10.5)
WBC # FLD AUTO: 4.16 K/UL — SIGNIFICANT CHANGE UP (ref 3.8–10.5)

## 2020-01-21 PROCEDURE — 99284 EMERGENCY DEPT VISIT MOD MDM: CPT

## 2020-01-21 PROCEDURE — 99284 EMERGENCY DEPT VISIT MOD MDM: CPT | Mod: 25

## 2020-01-21 PROCEDURE — 81003 URINALYSIS AUTO W/O SCOPE: CPT

## 2020-01-21 PROCEDURE — 96374 THER/PROPH/DIAG INJ IV PUSH: CPT

## 2020-01-21 PROCEDURE — 80053 COMPREHEN METABOLIC PANEL: CPT

## 2020-01-21 PROCEDURE — 36415 COLL VENOUS BLD VENIPUNCTURE: CPT

## 2020-01-21 PROCEDURE — 74176 CT ABD & PELVIS W/O CONTRAST: CPT

## 2020-01-21 PROCEDURE — 85027 COMPLETE CBC AUTOMATED: CPT

## 2020-01-21 PROCEDURE — 74176 CT ABD & PELVIS W/O CONTRAST: CPT | Mod: 26

## 2020-01-21 RX ORDER — SODIUM CHLORIDE 9 MG/ML
1000 INJECTION INTRAMUSCULAR; INTRAVENOUS; SUBCUTANEOUS ONCE
Refills: 0 | Status: COMPLETED | OUTPATIENT
Start: 2020-01-21 | End: 2020-01-21

## 2020-01-21 RX ORDER — ACETAMINOPHEN 500 MG
1000 TABLET ORAL ONCE
Refills: 0 | Status: COMPLETED | OUTPATIENT
Start: 2020-01-21 | End: 2020-01-21

## 2020-01-21 RX ADMIN — Medication 400 MILLIGRAM(S): at 16:20

## 2020-01-21 RX ADMIN — SODIUM CHLORIDE 1000 MILLILITER(S): 9 INJECTION INTRAMUSCULAR; INTRAVENOUS; SUBCUTANEOUS at 16:20

## 2020-01-21 NOTE — ED PROVIDER NOTE - PSH
History of carpal tunnel release of both wrists    History of coronary artery stent placement  x 5 stents  History of strabismus surgery    History of total knee replacement, left    S/P angioplasty

## 2020-01-21 NOTE — ED PROVIDER NOTE - PROGRESS NOTE DETAILS
CT shows granulomatous disease which the patient was aware of.  Pt reassessed, abdomen soft.  pt given copy of all results.  Will d/c

## 2020-01-21 NOTE — ED PROVIDER NOTE - OBJECTIVE STATEMENT
76 y/o M with PMHx of DM, HTN, BPH, DVT (on Xarelto), Carotid Stenosis, BBB and a PSHx of total knee replacement presents to ED c/o left flank pain x 5 days. Denies trauma, hematuria or urinary sxs. Denies similar symptoms in past. Pt called vascular surgeon, told might be kidney stone, told to come to ED for evaluation. Pt is scheduled to have spine surgery this coming week, stopped Xarelto medication. Pt then had a green field filter placed prophylactically 1:15.20. NKDA.

## 2020-01-21 NOTE — ED PROVIDER NOTE - ATTESTATION, MLM
wheelchair
I have reviewed and confirmed nurses' notes for patient's medications, allergies, medical history, and surgical history.

## 2020-01-21 NOTE — ED ADULT NURSE NOTE - CHPI ED NUR SYMPTOMS POS
2/2 to CE and also gallbladder rupture.   Patient used 288mg of oral Morphine equivalent (OME) between yesterday and this morning.   Will increase Fentanyl to 175 mcg/h.   Will change Dilaudid to 3 mg IV q 2 PRN   Continue Methadone 20 mg q 6 h ATC   Will Add Acetaminophen 1 gram q 8h x 2 days   Duloxetine 60 mg q daily .   Abx as per primary  and ID. 2/2 to CA and also gallbladder rupture.   Patient used 288mg of oral Morphine equivalent (OME) between yesterday and this morning.   Will increase Fentanyl to 175 mcg/h.   Will change Dilaudid to 3 mg IV q 2 PRN   Continue Methadone 20 mg q 6 h ATC   Will Add Acetaminophen 1 gram q 8h x 2 days   Duloxetine 60 mg q daily .   Abx as per primary  and ID. PAIN

## 2020-01-21 NOTE — ED PROVIDER NOTE - PATIENT PORTAL LINK FT
You can access the FollowMyHealth Patient Portal offered by [facility] by registering at the following website: [email or web address]. By joining [facility]’s FollowMyHealth portal, you will also be able to view your health information using other applications (apps) compatible with our system.

## 2020-01-23 VITALS
WEIGHT: 222.45 LBS | HEIGHT: 71 IN | OXYGEN SATURATION: 96 % | HEART RATE: 56 BPM | DIASTOLIC BLOOD PRESSURE: 74 MMHG | SYSTOLIC BLOOD PRESSURE: 160 MMHG | RESPIRATION RATE: 16 BRPM | TEMPERATURE: 98 F

## 2020-01-23 RX ORDER — POVIDONE-IODINE 5 %
1 AEROSOL (ML) TOPICAL ONCE
Refills: 0 | Status: COMPLETED | OUTPATIENT
Start: 2020-01-24 | End: 2020-01-24

## 2020-01-23 NOTE — H&P ADULT - HISTORY OF PRESENT ILLNESS
75y F with neck pain x   Patinet here for Anterior Arthrodesis, corpectomy-decompression: C4-C7 75y F with disequilibrium due to cord compression x 3-4 months worsened over time without improvement. Failed conservative treatments. Denies numbness, tingling. Ambulates with a cane. Pt denies any recent fevers/chills, chest pain, or shortness of breath. Pt had an unprovoked DVT 1 year ago. He currently takes Xarelto for this and his last dose was 1 week ago. He had an IVC filter placed 10 days ago. Presents today for elective Anterior Arthrodesis, corpectomy-decompression: C4-C7.

## 2020-01-23 NOTE — H&P ADULT - PROBLEM SELECTOR PLAN 1
Admit to Orthopaedic Service.  Presents today for elective Anterior Arthrodesis, corpectomy-decompression: C4-C7  Pt medically stable and cleared for procedure today by Dr. Rodrigez

## 2020-01-23 NOTE — H&P ADULT - NSICDXPASTSURGICALHX_GEN_ALL_CORE_FT
PAST SURGICAL HISTORY:  Elective surgery colon polyps removal    Elective surgery prostate ablation    Elective surgery ivc filter1/14/2020    H/O hernia repair     History of carpal tunnel release of both wrists     History of coronary artery stent placement x 5 stents    History of shoulder surgery left    History of strabismus surgery     History of total knee replacement, left     History of vasectomy     S/P angioplasty

## 2020-01-23 NOTE — PATIENT PROFILE ADULT - NSPROIMPLANTSMEDDEV_GEN_A_NUR
LEFT KNEE METAL, CARDIAC STENT/Artificial joint LEFT KNEE METAL, CARDIAC STENT, ivcc filter/Artificial joint

## 2020-01-23 NOTE — H&P ADULT - NSICDXPASTMEDICALHX_GEN_ALL_CORE_FT
PAST MEDICAL HISTORY:  BBB (bundle branch block) right    BPH (benign prostatic hyperplasia)     Cardiac angina     Carotid stenosis, right     DVT (deep venous thrombosis)     Hernia     Hypertension     Iliac aneurysm     Neuropathy     LISA (obstructive sleep apnea)     Pituitary adenoma     Vasovagal syndrome

## 2020-01-23 NOTE — H&P ADULT - NSHPPHYSICALEXAM_GEN_ALL_CORE
PE: Normal head, atraumatic. Normal skin, no rashes/lesions/erythema.        Rest of PE per medical clearance. NAD, sitting comfortably in chair  MSK: Decreased ROM of cervical spine secondary to pain, sensation intact to light touch in bilateral lower extremities, DP 2+ bilaterally, EHL/FHL/TA/GS 5/5 BLE  Rest of PE per medical clearance

## 2020-01-23 NOTE — H&P ADULT - NSHPLABSRESULTS_GEN_ALL_CORE
Preop cbc, bmp, nasal swab wnl  UA wnl  PT/INR/PTT elevated - repeat DOS (Was on xarelto)  preop cxr wnl per clearance  preop ekg wnl per clearance  Stress test EF 52%  Echo wnl per clearance

## 2020-01-24 ENCOUNTER — INPATIENT (INPATIENT)
Facility: HOSPITAL | Age: 75
LOS: 2 days | Discharge: HOME HEALTH | DRG: 472 | End: 2020-01-27
Attending: ORTHOPAEDIC SURGERY | Admitting: ORTHOPAEDIC SURGERY
Payer: COMMERCIAL

## 2020-01-24 ENCOUNTER — APPOINTMENT (OUTPATIENT)
Dept: ORTHOPEDIC SURGERY | Facility: HOSPITAL | Age: 75
End: 2020-01-24

## 2020-01-24 DIAGNOSIS — Z98.52 VASECTOMY STATUS: Chronic | ICD-10-CM

## 2020-01-24 DIAGNOSIS — D35.2 BENIGN NEOPLASM OF PITUITARY GLAND: ICD-10-CM

## 2020-01-24 DIAGNOSIS — M48.02 SPINAL STENOSIS, CERVICAL REGION: ICD-10-CM

## 2020-01-24 DIAGNOSIS — Z41.9 ENCOUNTER FOR PROCEDURE FOR PURPOSES OTHER THAN REMEDYING HEALTH STATE, UNSPECIFIED: Chronic | ICD-10-CM

## 2020-01-24 DIAGNOSIS — I65.21 OCCLUSION AND STENOSIS OF RIGHT CAROTID ARTERY: ICD-10-CM

## 2020-01-24 DIAGNOSIS — I72.3 ANEURYSM OF ILIAC ARTERY: ICD-10-CM

## 2020-01-24 DIAGNOSIS — Z98.890 OTHER SPECIFIED POSTPROCEDURAL STATES: Chronic | ICD-10-CM

## 2020-01-24 DIAGNOSIS — R55 SYNCOPE AND COLLAPSE: ICD-10-CM

## 2020-01-24 DIAGNOSIS — G62.9 POLYNEUROPATHY, UNSPECIFIED: ICD-10-CM

## 2020-01-24 DIAGNOSIS — N40.0 BENIGN PROSTATIC HYPERPLASIA WITHOUT LOWER URINARY TRACT SYMPTOMS: ICD-10-CM

## 2020-01-24 DIAGNOSIS — I10 ESSENTIAL (PRIMARY) HYPERTENSION: ICD-10-CM

## 2020-01-24 DIAGNOSIS — Z96.652 PRESENCE OF LEFT ARTIFICIAL KNEE JOINT: Chronic | ICD-10-CM

## 2020-01-24 DIAGNOSIS — G47.33 OBSTRUCTIVE SLEEP APNEA (ADULT) (PEDIATRIC): ICD-10-CM

## 2020-01-24 DIAGNOSIS — Z98.89 OTHER SPECIFIED POSTPROCEDURAL STATES: Chronic | ICD-10-CM

## 2020-01-24 DIAGNOSIS — I82.409 ACUTE EMBOLISM AND THROMBOSIS OF UNSPECIFIED DEEP VEINS OF UNSPECIFIED LOWER EXTREMITY: ICD-10-CM

## 2020-01-24 DIAGNOSIS — Z95.5 PRESENCE OF CORONARY ANGIOPLASTY IMPLANT AND GRAFT: Chronic | ICD-10-CM

## 2020-01-24 DIAGNOSIS — N63.0 UNSPECIFIED LUMP IN UNSPECIFIED BREAST: Chronic | ICD-10-CM

## 2020-01-24 LAB
ANION GAP SERPL CALC-SCNC: 12 MMOL/L — SIGNIFICANT CHANGE UP (ref 5–17)
APTT BLD: 30.4 SEC — SIGNIFICANT CHANGE UP (ref 27.5–36.3)
BASOPHILS # BLD AUTO: 0 K/UL — SIGNIFICANT CHANGE UP (ref 0–0.2)
BASOPHILS NFR BLD AUTO: 0 % — SIGNIFICANT CHANGE UP (ref 0–2)
BUN SERPL-MCNC: 20 MG/DL — SIGNIFICANT CHANGE UP (ref 7–23)
CALCIUM SERPL-MCNC: 9.1 MG/DL — SIGNIFICANT CHANGE UP (ref 8.4–10.5)
CHLORIDE SERPL-SCNC: 105 MMOL/L — SIGNIFICANT CHANGE UP (ref 96–108)
CO2 SERPL-SCNC: 22 MMOL/L — SIGNIFICANT CHANGE UP (ref 22–31)
CREAT SERPL-MCNC: 0.85 MG/DL — SIGNIFICANT CHANGE UP (ref 0.5–1.3)
EOSINOPHIL # BLD AUTO: 0 K/UL — SIGNIFICANT CHANGE UP (ref 0–0.5)
EOSINOPHIL NFR BLD AUTO: 0 % — SIGNIFICANT CHANGE UP (ref 0–6)
GIANT PLATELETS BLD QL SMEAR: PRESENT — SIGNIFICANT CHANGE UP
GLUCOSE SERPL-MCNC: 137 MG/DL — HIGH (ref 70–99)
HCT VFR BLD CALC: 37.2 % — LOW (ref 39–50)
HGB BLD-MCNC: 12.1 G/DL — LOW (ref 13–17)
INR BLD: 1.06 — SIGNIFICANT CHANGE UP (ref 0.88–1.16)
INR BLD: 1.18 — HIGH (ref 0.88–1.16)
LYMPHOCYTES # BLD AUTO: 0 % — LOW (ref 13–44)
LYMPHOCYTES # BLD AUTO: 0 K/UL — LOW (ref 1–3.3)
MANUAL SMEAR VERIFICATION: SIGNIFICANT CHANGE UP
MCHC RBC-ENTMCNC: 32.1 PG — SIGNIFICANT CHANGE UP (ref 27–34)
MCHC RBC-ENTMCNC: 32.5 GM/DL — SIGNIFICANT CHANGE UP (ref 32–36)
MCV RBC AUTO: 98.7 FL — SIGNIFICANT CHANGE UP (ref 80–100)
MONOCYTES # BLD AUTO: 0.17 K/UL — SIGNIFICANT CHANGE UP (ref 0–0.9)
MONOCYTES NFR BLD AUTO: 2.6 % — SIGNIFICANT CHANGE UP (ref 2–14)
NEUTROPHILS # BLD AUTO: 6.22 K/UL — SIGNIFICANT CHANGE UP (ref 1.8–7.4)
NEUTROPHILS NFR BLD AUTO: 97.4 % — HIGH (ref 43–77)
PLAT MORPH BLD: ABNORMAL
PLATELET # BLD AUTO: 166 K/UL — SIGNIFICANT CHANGE UP (ref 150–400)
POTASSIUM SERPL-MCNC: 4.1 MMOL/L — SIGNIFICANT CHANGE UP (ref 3.5–5.3)
POTASSIUM SERPL-SCNC: 4.1 MMOL/L — SIGNIFICANT CHANGE UP (ref 3.5–5.3)
PROTHROM AB SERPL-ACNC: 12.1 SEC — SIGNIFICANT CHANGE UP (ref 10–12.9)
PROTHROM AB SERPL-ACNC: 13.5 SEC — HIGH (ref 10–12.9)
RBC # BLD: 3.77 M/UL — LOW (ref 4.2–5.8)
RBC # FLD: 12.7 % — SIGNIFICANT CHANGE UP (ref 10.3–14.5)
RBC BLD AUTO: ABNORMAL
SODIUM SERPL-SCNC: 139 MMOL/L — SIGNIFICANT CHANGE UP (ref 135–145)
SPHEROCYTES BLD QL SMEAR: SLIGHT — SIGNIFICANT CHANGE UP
WBC # BLD: 6.39 K/UL — SIGNIFICANT CHANGE UP (ref 3.8–10.5)
WBC # FLD AUTO: 6.39 K/UL — SIGNIFICANT CHANGE UP (ref 3.8–10.5)

## 2020-01-24 PROCEDURE — 63082 REMOVE VERTEBRAL BODY ADD-ON: CPT | Mod: 62

## 2020-01-24 PROCEDURE — 63081 REMOVE VERT BODY DCMPRN CRVL: CPT | Mod: 62

## 2020-01-24 PROCEDURE — 99222 1ST HOSP IP/OBS MODERATE 55: CPT

## 2020-01-24 PROCEDURE — 22845 INSERT SPINE FIXATION DEVICE: CPT | Mod: 59

## 2020-01-24 PROCEDURE — 22585 ARTHRD ANT NTRBD MIN DSC EA: CPT | Mod: 62

## 2020-01-24 PROCEDURE — 99221 1ST HOSP IP/OBS SF/LOW 40: CPT

## 2020-01-24 PROCEDURE — 22554 ARTHRD ANT NTRBD MIN DSC CRV: CPT | Mod: 62

## 2020-01-24 PROCEDURE — 22854 INSJ BIOMECHANICAL DEVICE: CPT

## 2020-01-24 PROCEDURE — 72125 CT NECK SPINE W/O DYE: CPT | Mod: 26

## 2020-01-24 RX ORDER — ATORVASTATIN CALCIUM 80 MG/1
20 TABLET, FILM COATED ORAL ONCE
Refills: 0 | Status: CANCELLED | OUTPATIENT
Start: 2020-01-31 | End: 2020-01-27

## 2020-01-24 RX ORDER — DEXAMETHASONE 0.5 MG/5ML
4 ELIXIR ORAL EVERY 6 HOURS
Refills: 0 | Status: DISCONTINUED | OUTPATIENT
Start: 2020-01-24 | End: 2020-01-24

## 2020-01-24 RX ORDER — HYDROMORPHONE HYDROCHLORIDE 2 MG/ML
0.5 INJECTION INTRAMUSCULAR; INTRAVENOUS; SUBCUTANEOUS
Refills: 0 | Status: DISCONTINUED | OUTPATIENT
Start: 2020-01-24 | End: 2020-01-27

## 2020-01-24 RX ORDER — CEFAZOLIN SODIUM 1 G
2000 VIAL (EA) INJECTION EVERY 8 HOURS
Refills: 0 | Status: COMPLETED | OUTPATIENT
Start: 2020-01-24 | End: 2020-01-25

## 2020-01-24 RX ORDER — SENNA PLUS 8.6 MG/1
2 TABLET ORAL AT BEDTIME
Refills: 0 | Status: DISCONTINUED | OUTPATIENT
Start: 2020-01-24 | End: 2020-01-27

## 2020-01-24 RX ORDER — ATORVASTATIN CALCIUM 80 MG/1
20 TABLET, FILM COATED ORAL ONCE
Refills: 0 | Status: DISCONTINUED | OUTPATIENT
Start: 2020-01-27 | End: 2020-01-27

## 2020-01-24 RX ORDER — CYCLOSPORINE 100 MG/1
175 CAPSULE ORAL DAILY
Refills: 0 | Status: DISCONTINUED | OUTPATIENT
Start: 2020-01-24 | End: 2020-01-27

## 2020-01-24 RX ORDER — CHLORHEXIDINE GLUCONATE 213 G/1000ML
1 SOLUTION TOPICAL ONCE
Refills: 0 | Status: COMPLETED | OUTPATIENT
Start: 2020-01-24 | End: 2020-01-24

## 2020-01-24 RX ORDER — DEXAMETHASONE 0.5 MG/5ML
4 ELIXIR ORAL EVERY 6 HOURS
Refills: 0 | Status: COMPLETED | OUTPATIENT
Start: 2020-01-24 | End: 2020-01-25

## 2020-01-24 RX ORDER — LISINOPRIL 2.5 MG/1
15 TABLET ORAL DAILY
Refills: 0 | Status: DISCONTINUED | OUTPATIENT
Start: 2020-01-24 | End: 2020-01-27

## 2020-01-24 RX ORDER — ATORVASTATIN CALCIUM 80 MG/1
20 TABLET, FILM COATED ORAL ONCE
Refills: 0 | Status: CANCELLED | OUTPATIENT
Start: 2020-01-29 | End: 2020-01-27

## 2020-01-24 RX ORDER — OXYCODONE HYDROCHLORIDE 5 MG/1
5 TABLET ORAL EVERY 4 HOURS
Refills: 0 | Status: DISCONTINUED | OUTPATIENT
Start: 2020-01-24 | End: 2020-01-27

## 2020-01-24 RX ORDER — ACETAMINOPHEN 500 MG
1000 TABLET ORAL ONCE
Refills: 0 | Status: DISCONTINUED | OUTPATIENT
Start: 2020-01-24 | End: 2020-01-24

## 2020-01-24 RX ORDER — CYCLOBENZAPRINE HYDROCHLORIDE 10 MG/1
5 TABLET, FILM COATED ORAL THREE TIMES A DAY
Refills: 0 | Status: DISCONTINUED | OUTPATIENT
Start: 2020-01-24 | End: 2020-01-27

## 2020-01-24 RX ORDER — FINASTERIDE 5 MG/1
5 TABLET, FILM COATED ORAL DAILY
Refills: 0 | Status: DISCONTINUED | OUTPATIENT
Start: 2020-01-24 | End: 2020-01-27

## 2020-01-24 RX ORDER — OXYBUTYNIN CHLORIDE 5 MG
10 TABLET ORAL
Refills: 0 | Status: DISCONTINUED | OUTPATIENT
Start: 2020-01-24 | End: 2020-01-27

## 2020-01-24 RX ORDER — GABAPENTIN 400 MG/1
300 CAPSULE ORAL
Refills: 0 | Status: DISCONTINUED | OUTPATIENT
Start: 2020-01-24 | End: 2020-01-27

## 2020-01-24 RX ORDER — EZETIMIBE 10 MG/1
10 TABLET ORAL DAILY
Refills: 0 | Status: DISCONTINUED | OUTPATIENT
Start: 2020-01-24 | End: 2020-01-26

## 2020-01-24 RX ORDER — ASPIRIN/CALCIUM CARB/MAGNESIUM 324 MG
81 TABLET ORAL DAILY
Refills: 0 | Status: DISCONTINUED | OUTPATIENT
Start: 2020-01-24 | End: 2020-01-27

## 2020-01-24 RX ORDER — FAMOTIDINE 10 MG/ML
20 INJECTION INTRAVENOUS EVERY 12 HOURS
Refills: 0 | Status: DISCONTINUED | OUTPATIENT
Start: 2020-01-24 | End: 2020-01-27

## 2020-01-24 RX ORDER — POLYETHYLENE GLYCOL 3350 17 G/17G
17 POWDER, FOR SOLUTION ORAL DAILY
Refills: 0 | Status: DISCONTINUED | OUTPATIENT
Start: 2020-01-24 | End: 2020-01-27

## 2020-01-24 RX ORDER — ONDANSETRON 8 MG/1
4 TABLET, FILM COATED ORAL EVERY 6 HOURS
Refills: 0 | Status: DISCONTINUED | OUTPATIENT
Start: 2020-01-24 | End: 2020-01-27

## 2020-01-24 RX ORDER — LATANOPROST 0.05 MG/ML
1 SOLUTION/ DROPS OPHTHALMIC; TOPICAL AT BEDTIME
Refills: 0 | Status: DISCONTINUED | OUTPATIENT
Start: 2020-01-24 | End: 2020-01-27

## 2020-01-24 RX ORDER — ACETAMINOPHEN 500 MG
1000 TABLET ORAL EVERY 8 HOURS
Refills: 0 | Status: DISCONTINUED | OUTPATIENT
Start: 2020-01-24 | End: 2020-01-27

## 2020-01-24 RX ORDER — METOCLOPRAMIDE HCL 10 MG
10 TABLET ORAL EVERY 8 HOURS
Refills: 0 | Status: DISCONTINUED | OUTPATIENT
Start: 2020-01-24 | End: 2020-01-27

## 2020-01-24 RX ORDER — METOCLOPRAMIDE HCL 10 MG
10 TABLET ORAL EVERY 8 HOURS
Refills: 0 | Status: DISCONTINUED | OUTPATIENT
Start: 2020-01-24 | End: 2020-01-24

## 2020-01-24 RX ORDER — ACETAMINOPHEN 500 MG
1000 TABLET ORAL ONCE
Refills: 0 | Status: COMPLETED | OUTPATIENT
Start: 2020-01-24 | End: 2020-01-24

## 2020-01-24 RX ORDER — ASPIRIN/CALCIUM CARB/MAGNESIUM 324 MG
81 TABLET ORAL ONCE
Refills: 0 | Status: COMPLETED | OUTPATIENT
Start: 2020-01-24 | End: 2020-01-24

## 2020-01-24 RX ORDER — SODIUM CHLORIDE 9 MG/ML
1000 INJECTION, SOLUTION INTRAVENOUS
Refills: 0 | Status: DISCONTINUED | OUTPATIENT
Start: 2020-01-24 | End: 2020-01-27

## 2020-01-24 RX ADMIN — Medication 10 MILLIGRAM(S): at 14:38

## 2020-01-24 RX ADMIN — SODIUM CHLORIDE 100 MILLILITER(S): 9 INJECTION, SOLUTION INTRAVENOUS at 16:48

## 2020-01-24 RX ADMIN — Medication 1 APPLICATION(S): at 07:15

## 2020-01-24 RX ADMIN — Medication 10 MILLIGRAM(S): at 22:08

## 2020-01-24 RX ADMIN — CYCLOBENZAPRINE HYDROCHLORIDE 5 MILLIGRAM(S): 10 TABLET, FILM COATED ORAL at 16:40

## 2020-01-24 RX ADMIN — Medication 400 MILLIGRAM(S): at 16:55

## 2020-01-24 RX ADMIN — Medication 10 MILLIGRAM(S): at 18:16

## 2020-01-24 RX ADMIN — HYDROMORPHONE HYDROCHLORIDE 0.5 MILLIGRAM(S): 2 INJECTION INTRAMUSCULAR; INTRAVENOUS; SUBCUTANEOUS at 15:59

## 2020-01-24 RX ADMIN — Medication 2000 MILLIGRAM(S): at 16:55

## 2020-01-24 RX ADMIN — LATANOPROST 1 DROP(S): 0.05 SOLUTION/ DROPS OPHTHALMIC; TOPICAL at 22:07

## 2020-01-24 RX ADMIN — Medication 81 MILLIGRAM(S): at 07:39

## 2020-01-24 RX ADMIN — Medication 4 MILLIGRAM(S): at 16:54

## 2020-01-24 RX ADMIN — GABAPENTIN 300 MILLIGRAM(S): 400 CAPSULE ORAL at 18:15

## 2020-01-24 RX ADMIN — CHLORHEXIDINE GLUCONATE 1 APPLICATION(S): 213 SOLUTION TOPICAL at 07:28

## 2020-01-24 RX ADMIN — SENNA PLUS 2 TABLET(S): 8.6 TABLET ORAL at 23:08

## 2020-01-24 RX ADMIN — HYDROMORPHONE HYDROCHLORIDE 0.5 MILLIGRAM(S): 2 INJECTION INTRAMUSCULAR; INTRAVENOUS; SUBCUTANEOUS at 16:15

## 2020-01-24 RX ADMIN — CYCLOBENZAPRINE HYDROCHLORIDE 5 MILLIGRAM(S): 10 TABLET, FILM COATED ORAL at 23:08

## 2020-01-24 RX ADMIN — Medication 4 MILLIGRAM(S): at 23:07

## 2020-01-24 RX ADMIN — Medication 160 MILLIGRAM(S): at 16:41

## 2020-01-24 RX ADMIN — Medication 1000 MILLIGRAM(S): at 16:55

## 2020-01-24 NOTE — PROGRESS NOTE ADULT - SUBJECTIVE AND OBJECTIVE BOX
Orthopedics Post Op Check    Procedure: ACDF C5-C7, PARTIAL CORPECTOMY C6  Surgeon: MINO     Pt. stable, laying in bed, but c/o severe pain in neck. Pt. states he had weakness in his hands before surgery.  Denies any SOB/CP/nausea/vomiting/ numbness/tingling in b/l ues.     Vital Signs Last 24 Hrs  T(C): 36.2 (24 Jan 2020 13:44), Max: 36.2 (24 Jan 2020 13:44)  T(F): 97.2 (24 Jan 2020 13:44), Max: 97.2 (24 Jan 2020 13:44)  HR: 62 (24 Jan 2020 14:30) (60 - 71)  BP: 154/79 (24 Jan 2020 14:30) (147/79 - 161/82)  BP(mean): 114 (24 Jan 2020 14:24) (106 - 118)  RR: 13 (24 Jan 2020 14:30) (11 - 13)  SpO2: 100% (24 Jan 2020 14:30) (98% - 100%)      Dressing C/D/I with 1 drain     Pulses: Brachial/Radial 2+  SLT: intact M/U/R  Motor: /Finger Int/Wrist flexion/ext/Biceps/triceps/Delts 5/5                          12.1   6.39  )-----------( 166      ( 24 Jan 2020 14:02 )             37.2   01-24    139  |  105  |  20  ----------------------------<  137<H>  4.1   |  22  |  0.85    Ca    9.1      24 Jan 2020 14:02        A/P: 75yoMale POD#0 s/p ACDF C5-C7, PARTIAL CORPECTOMY C6  - Stable  - Pain Control  - DVT ppx: asa 81  - Post op abx: ancef  - PT, WBS: wbat b/l ues  - F/U AM Labs

## 2020-01-24 NOTE — CONSULT NOTE ADULT - SUBJECTIVE AND OBJECTIVE BOX
HPI:  Mr. Richardson is a 76 yo F with history of CAD s/p 5 prior stents, unprovoked DVT (s/p IVC filter, Xarelto held for this surgery) admitted for elective anterior arthrodesis, corpectomy-decompression C4-C7 now post op day 0.       PAST MEDICAL & SURGICAL HISTORY:  Breast lump  Eczema  CAD (coronary artery disease): 5 stents  DVT (deep venous thrombosis)  Hernia  Pituitary adenoma  Neuropathy  Carotid stenosis, right  Iliac aneurysm  Vasovagal syndrome  LISA (obstructive sleep apnea)  BBB (bundle branch block): right  BPH (benign prostatic hyperplasia)  Hypertension  Cardiac angina  Breast lump: removed  Elective surgery: ivc filter1/14/2020  Elective surgery: prostate ablation  Elective surgery: colon polyps removal  H/O hernia repair  History of vasectomy  History of shoulder surgery: left  History of carpal tunnel release of both wrists  History of strabismus surgery  History of total knee replacement, left  History of coronary artery stent placement: x 5 stents  S/P angioplasty      SOCIAL HISTORY:  - Smoking: Denies    FAMILY HISTORY:  Non-contributory    Allergies    Levaquin (Rash)  rifampin (Rash)    Intolerances      MEDICATIONS  (STANDING):  acetaminophen   Tablet .. 1000 milliGRAM(s) Oral every 8 hours  aspirin  chewable 81 milliGRAM(s) Oral daily  ceFAZolin  Injectable. 2000 milliGRAM(s) IV Push every 8 hours  cyclobenzaprine 5 milliGRAM(s) Oral three times a day  cycloSPORINE  , modified (GENGRAF) 175 milliGRAM(s) Oral daily  dexAMETHasone  Injectable 4 milliGRAM(s) IV Push every 6 hours  ezetimibe 10 milliGRAM(s) Oral daily  finasteride 5 milliGRAM(s) Oral daily  gabapentin 300 milliGRAM(s) Oral two times a day  lactated ringers. 1000 milliLiter(s) (100 mL/Hr) IV Continuous <Continuous>  latanoprost 0.005% Ophthalmic Solution 1 Drop(s) Left EYE at bedtime  lisinopril 15 milliGRAM(s) Oral daily  metoclopramide Injectable 10 milliGRAM(s) IV Push every 8 hours  oxybutynin 10 milliGRAM(s) Oral two times a day  polyethylene glycol 3350 17 Gram(s) Oral daily  senna 2 Tablet(s) Oral at bedtime    MEDICATIONS  (PRN):  bisacodyl 5 milliGRAM(s) Oral every 12 hours PRN Constipation  bisacodyl Suppository 10 milliGRAM(s) Rectal daily PRN Constipation  famotidine    Tablet 20 milliGRAM(s) Oral every 12 hours PRN Dyspepsia  HYDROmorphone  Injectable 0.5 milliGRAM(s) IV Push every 30 minutes PRN severe breakthrough pain  ondansetron Injectable 4 milliGRAM(s) IV Push every 6 hours PRN Nausea  oxyCODONE    IR 5 milliGRAM(s) Oral every 4 hours PRN severe breakthrough pain      REVIEW OF SYSTEMS:  12 point ROS negative except for that stated in the HPI    PHYSICAL EXAM:  Vitals past 24 Hours: T(C): 36.2 (01-24-20 @ 13:44), Max: 36.2 (01-24-20 @ 13:44)  HR: 71 (01-24-20 @ 16:30) (60 - 71)  BP: 154/83 (01-24-20 @ 16:30) (147/79 - 171/84)  RR: 12 (01-24-20 @ 16:30) (11 - 15)  SpO2: 100% (01-24-20 @ 16:30) (98% - 100%)	    GEN: Alert and awake, no acute distress  HEENT: Moist mucous membranes  Neck: No JVD  Cardiovascular: Regular rate and rhythm, +S1 S2. No murmurs, rubs, or gallops appreciated  Respiratory: Lungs clear to auscultation bilaterally  Gastrointestinal:  Soft, non-tender, non-distended, normoactive bowel sounds  Skin: No rashes, No ecchymoses, No cyanosis  Neurologic: Non-focal, alert and oriented x3.   Extremities: No clubbing, cyanosis or edema. Warm, well-perfused extremities.   Vascular: Radial and dorsalis pedis pulses 2+ bilaterally    I&O's Detail    24 Jan 2020 07:01  -  24 Jan 2020 17:06  --------------------------------------------------------  IN:    lactated ringers.: 300 mL  Total IN: 300 mL    OUT:    Estimated Blood Loss: 75 mL    Indwelling Catheter - Urethral: 1675 mL  Total OUT: 1750 mL    Total NET: -1450 mL      LABS:                        12.1   6.39  )-----------( 166      ( 24 Jan 2020 14:02 )             37.2     01-24    139  |  105  |  20  ----------------------------<  137<H>  4.1   |  22  |  0.85    Ca    9.1      24 Jan 2020 14:02    PT/INR - ( 24 Jan 2020 14:02 )   PT: 13.5 sec;   INR: 1.18          PTT - ( 24 Jan 2020 14:02 )  PTT:40.5 sec    I&O's Summary    24 Jan 2020 07:01  -  24 Jan 2020 17:06  --------------------------------------------------------  IN: 300 mL / OUT: 1750 mL / NET: -1450 mL    CARDIAC DIAGNOSTIC TESTING:    ECG:    ECHO:  Per primary team: normal EF. Mild AS/Moderate AR. full read unavailable      ASSESSMENT/PLAN:  Mr. Richardson is a 76 yo F with history of CAD s/p 5 prior stents, unprovoked DVT (s/p IVC filter, Xarelto held for this surgery) admitted for elective anterior arthrodesis, corpectomy-decompression C4-C7 now post op day 0.     - Continue current medications - aspirin, statin, lisinopril  - Restart xarelto when deemed safe by primary team      Leigha Rodgers  Cardiology Fellow HPI:  Mr. Richardson is a 74 yo F with history of CAD s/p 5 prior stents, unprovoked DVT (s/p IVC filter, Xarelto held for this surgery) admitted for elective anterior arthrodesis, corpectomy-decompression C4-C7 now post op day 0. He denies chest pain, shortness of breath, palpitations, lightheadedness, lower extremity edema, orthopnea, paroxysmal nocturnal dyspnea. He has some pain from surgery but improving with pain medications.     PAST MEDICAL & SURGICAL HISTORY:  Breast lump  Eczema  CAD (coronary artery disease): 5 stents  DVT (deep venous thrombosis)  Hernia  Pituitary adenoma  Neuropathy  Carotid stenosis, right  Iliac aneurysm  Vasovagal syndrome  LISA (obstructive sleep apnea)  BBB (bundle branch block): right  BPH (benign prostatic hyperplasia)  Hypertension  Cardiac angina  Breast lump: removed  Elective surgery: ivc filter1/14/2020  Elective surgery: prostate ablation  Elective surgery: colon polyps removal  H/O hernia repair  History of vasectomy  History of shoulder surgery: left  History of carpal tunnel release of both wrists  History of strabismus surgery  History of total knee replacement, left  History of coronary artery stent placement: x 5 stents  S/P angioplasty      SOCIAL HISTORY:  - Smoking: Denies, quit >50 years ago  - Alcohol: Quit more than 35 years ago  - Recreational drugs: denies    FAMILY HISTORY:  Non-contributory    Allergies    Levaquin (Rash)  rifampin (Rash)    Intolerances      MEDICATIONS  (STANDING):  acetaminophen   Tablet .. 1000 milliGRAM(s) Oral every 8 hours  aspirin  chewable 81 milliGRAM(s) Oral daily  ceFAZolin  Injectable. 2000 milliGRAM(s) IV Push every 8 hours  cyclobenzaprine 5 milliGRAM(s) Oral three times a day  cycloSPORINE  , modified (GENGRAF) 175 milliGRAM(s) Oral daily  dexAMETHasone  Injectable 4 milliGRAM(s) IV Push every 6 hours  ezetimibe 10 milliGRAM(s) Oral daily  finasteride 5 milliGRAM(s) Oral daily  gabapentin 300 milliGRAM(s) Oral two times a day  lactated ringers. 1000 milliLiter(s) (100 mL/Hr) IV Continuous <Continuous>  latanoprost 0.005% Ophthalmic Solution 1 Drop(s) Left EYE at bedtime  lisinopril 15 milliGRAM(s) Oral daily  metoclopramide Injectable 10 milliGRAM(s) IV Push every 8 hours  oxybutynin 10 milliGRAM(s) Oral two times a day  polyethylene glycol 3350 17 Gram(s) Oral daily  senna 2 Tablet(s) Oral at bedtime    MEDICATIONS  (PRN):  bisacodyl 5 milliGRAM(s) Oral every 12 hours PRN Constipation  bisacodyl Suppository 10 milliGRAM(s) Rectal daily PRN Constipation  famotidine    Tablet 20 milliGRAM(s) Oral every 12 hours PRN Dyspepsia  HYDROmorphone  Injectable 0.5 milliGRAM(s) IV Push every 30 minutes PRN severe breakthrough pain  ondansetron Injectable 4 milliGRAM(s) IV Push every 6 hours PRN Nausea  oxyCODONE    IR 5 milliGRAM(s) Oral every 4 hours PRN severe breakthrough pain      REVIEW OF SYSTEMS:  12 point ROS negative except for that stated in the HPI    PHYSICAL EXAM:  Vitals past 24 Hours: T(C): 36.2 (01-24-20 @ 13:44), Max: 36.2 (01-24-20 @ 13:44)  HR: 71 (01-24-20 @ 16:30) (60 - 71)  BP: 154/83 (01-24-20 @ 16:30) (147/79 - 171/84)  RR: 12 (01-24-20 @ 16:30) (11 - 15)  SpO2: 100% (01-24-20 @ 16:30) (98% - 100%)	    GEN: Alert and awake, no acute distress. C-Spine collar in place  HEENT: Moist mucous membranes  Neck: unable to assess  Cardiovascular: Regular rate and rhythm, +S1 S2. No murmurs, rubs, or gallops appreciated  Respiratory: Lungs clear to auscultation bilaterally  Gastrointestinal:  Soft, non-tender, non-distended  Neurologic: Non-focal, alert and oriented x3.   Extremities: No edema. Warm, well-perfused extremities.   Vascular: Radial pulses 2+ bilaterally    I&O's Detail    24 Jan 2020 07:01  -  24 Jan 2020 17:06  --------------------------------------------------------  IN:    lactated ringers.: 300 mL  Total IN: 300 mL    OUT:    Estimated Blood Loss: 75 mL    Indwelling Catheter - Urethral: 1675 mL  Total OUT: 1750 mL    Total NET: -1450 mL      LABS:                        12.1   6.39  )-----------( 166      ( 24 Jan 2020 14:02 )             37.2     01-24    139  |  105  |  20  ----------------------------<  137<H>  4.1   |  22  |  0.85    Ca    9.1      24 Jan 2020 14:02    PT/INR - ( 24 Jan 2020 14:02 )   PT: 13.5 sec;   INR: 1.18          PTT - ( 24 Jan 2020 14:02 )  PTT:40.5 sec    I&O's Summary    24 Jan 2020 07:01  -  24 Jan 2020 17:06  --------------------------------------------------------  IN: 300 mL / OUT: 1750 mL / NET: -1450 mL    CARDIAC DIAGNOSTIC TESTING:    ECG:  Not available    ECHO:  Per primary team: normal EF. Mild AS/Moderate AR. full read unavailable    ASSESSMENT/PLAN:  Mr. Richardson is a 74 yo F with history of CAD s/p 5 prior stents, unprovoked DVT (s/p IVC filter, Xarelto held for this surgery) admitted for elective anterior arthrodesis, corpectomy-decompression C4-C7 now post op day 0. He is asymptomatic and has no cardiac symptoms at this time. He is hypertensive likely in the setting of pain from surgery, improved with pain medications.     - Continue current medications - aspirin, statin, zetia, lisinopril  - Restart xarelto when deemed safe by primary team  - Check post-op EKG      Leigha Rodgers  Cardiology Fellow

## 2020-01-24 NOTE — CONSULT NOTE ADULT - SUBJECTIVE AND OBJECTIVE BOX
Patient is a 75y old  Male who presents with a chief complaint of neck pain (23 Jan 2020 13:34)    neck pain and radicular symptoms in the UE     HPI:  75y F with neck pain x   Patinet here for Anterior Arthrodesis, corpectomy-decompression: C4-C7 (23 Jan 2020 13:34)      Allergies  Levaquin (Rash)  rifampin (Rash)      Health Issues  G95.20  No pertinent family history in first degree relatives  Handoff  Breast lump  Eczema  CAD (coronary artery disease)  DVT (deep venous thrombosis)  Hernia  Pituitary adenoma  Pituitary disorder  Neuropathy  Carotid stenosis, right  Iliac aneurysm  Vasovagal syndrome  LISA (obstructive sleep apnea)  BBB (bundle branch block)  BPH (benign prostatic hyperplasia)  Hypertension  Cardiac angina  Diabetes  No pertinent past medical history  Deep vein thrombosis (DVT) of lower extremity, unspecified chronicity, unspecified laterality, unspecified vein  Pituitary adenoma  Neuropathy  Carotid stenosis, right  Iliac aneurysm  Vasovagal syndrome  LISA (obstructive sleep apnea)  Benign prostatic hyperplasia, unspecified whether lower urinary tract symptoms present  Hypertension, unspecified type  Spinal stenosis in cervical region  Breast lump  Elective surgery  Elective surgery  Elective surgery  H/O hernia repair  History of vasectomy  History of shoulder surgery  History of carpal tunnel release of both wrists  History of strabismus surgery  History of total knee replacement, left  History of coronary artery stent placement  S/P angioplasty        FAMILY HISTORY:  No pertinent family history in first degree relatives      MEDICATIONS  (STANDING):  aspirin  chewable 81 milliGRAM(s) Oral once    MEDICATIONS  (PRN):      PAST MEDICAL & SURGICAL HISTORY:  Breast lump  Eczema  CAD (coronary artery disease): 5 stents  DVT (deep venous thrombosis)  Hernia  Pituitary adenoma  Neuropathy  Carotid stenosis, right  Iliac aneurysm  Vasovagal syndrome  LIAS (obstructive sleep apnea)  BBB (bundle branch block): right  BPH (benign prostatic hyperplasia)  Hypertension  Cardiac angina  Breast lump: removed  Elective surgery: ivc filter1/14/2020  Elective surgery: prostate ablation  Elective surgery: colon polyps removal  H/O hernia repair  History of vasectomy  History of shoulder surgery: left  History of carpal tunnel release of both wrists  History of strabismus surgery  History of total knee replacement, left  History of coronary artery stent placement: x 5 stents  S/P angioplasty      Labs              Radiology:    Physical Exam    MENTAL STATUS  -Level of Consciousness- awake    Orientation- person, place time  Language- aphasia/ dysarthria- nl  Memory- recent and remote- nl      Cranial Nerve 1- 12  Pupils- equal and reactive  Eye movements-full  Facial - no asymmetry   Lower CN-nl    Gait and Station- n/a    MOTOR  Upper- mild proximal weakness  Lower- no long tract findings    Reflexes- decreased    Sensation- no sensory level    Cerebellar- no tremors    vascular - no bruits    Assessment- Cervical radiculopathy    Plan as per ortho

## 2020-01-24 NOTE — PACU DISCHARGE NOTE - COMMENTS
axo3 Gretchen liquids Moving all extremities Med for pain prn Anterior neck dsg dry and intact Accordian drain intact with no drainage. Eval by team at bedside . Report given to unit nurse for transfer in bed on CM to room 937/2

## 2020-01-24 NOTE — BRIEF OPERATIVE NOTE - OPERATION/FINDINGS
severe cord compression C5-C6; performed complete C6 and partial C5 corpectomy, C5-C7 anterior plate; see operative note for full details

## 2020-01-25 DIAGNOSIS — Z09 ENCOUNTER FOR FOLLOW-UP EXAMINATION AFTER COMPLETED TREATMENT FOR CONDITIONS OTHER THAN MALIGNANT NEOPLASM: ICD-10-CM

## 2020-01-25 DIAGNOSIS — E78.5 HYPERLIPIDEMIA, UNSPECIFIED: ICD-10-CM

## 2020-01-25 DIAGNOSIS — I25.10 ATHEROSCLEROTIC HEART DISEASE OF NATIVE CORONARY ARTERY WITHOUT ANGINA PECTORIS: ICD-10-CM

## 2020-01-25 LAB
ANION GAP SERPL CALC-SCNC: 11 MMOL/L — SIGNIFICANT CHANGE UP (ref 5–17)
BASOPHILS # BLD AUTO: 0.01 K/UL — SIGNIFICANT CHANGE UP (ref 0–0.2)
BASOPHILS NFR BLD AUTO: 0.1 % — SIGNIFICANT CHANGE UP (ref 0–2)
BUN SERPL-MCNC: 19 MG/DL — SIGNIFICANT CHANGE UP (ref 7–23)
CALCIUM SERPL-MCNC: 8.9 MG/DL — SIGNIFICANT CHANGE UP (ref 8.4–10.5)
CHLORIDE SERPL-SCNC: 105 MMOL/L — SIGNIFICANT CHANGE UP (ref 96–108)
CO2 SERPL-SCNC: 23 MMOL/L — SIGNIFICANT CHANGE UP (ref 22–31)
CREAT SERPL-MCNC: 0.97 MG/DL — SIGNIFICANT CHANGE UP (ref 0.5–1.3)
EOSINOPHIL # BLD AUTO: 0 K/UL — SIGNIFICANT CHANGE UP (ref 0–0.5)
EOSINOPHIL NFR BLD AUTO: 0 % — SIGNIFICANT CHANGE UP (ref 0–6)
GLUCOSE SERPL-MCNC: 153 MG/DL — HIGH (ref 70–99)
HCT VFR BLD CALC: 35.1 % — LOW (ref 39–50)
HGB BLD-MCNC: 11.7 G/DL — LOW (ref 13–17)
IMM GRANULOCYTES NFR BLD AUTO: 1 % — SIGNIFICANT CHANGE UP (ref 0–1.5)
LYMPHOCYTES # BLD AUTO: 0.14 K/UL — LOW (ref 1–3.3)
LYMPHOCYTES # BLD AUTO: 1.1 % — LOW (ref 13–44)
MCHC RBC-ENTMCNC: 32.6 PG — SIGNIFICANT CHANGE UP (ref 27–34)
MCHC RBC-ENTMCNC: 33.3 GM/DL — SIGNIFICANT CHANGE UP (ref 32–36)
MCV RBC AUTO: 97.8 FL — SIGNIFICANT CHANGE UP (ref 80–100)
MONOCYTES # BLD AUTO: 0.65 K/UL — SIGNIFICANT CHANGE UP (ref 0–0.9)
MONOCYTES NFR BLD AUTO: 5.1 % — SIGNIFICANT CHANGE UP (ref 2–14)
NEUTROPHILS # BLD AUTO: 11.94 K/UL — HIGH (ref 1.8–7.4)
NEUTROPHILS NFR BLD AUTO: 92.7 % — HIGH (ref 43–77)
NRBC # BLD: 0 /100 WBCS — SIGNIFICANT CHANGE UP (ref 0–0)
PLATELET # BLD AUTO: 175 K/UL — SIGNIFICANT CHANGE UP (ref 150–400)
POTASSIUM SERPL-MCNC: 4.4 MMOL/L — SIGNIFICANT CHANGE UP (ref 3.5–5.3)
POTASSIUM SERPL-SCNC: 4.4 MMOL/L — SIGNIFICANT CHANGE UP (ref 3.5–5.3)
RBC # BLD: 3.59 M/UL — LOW (ref 4.2–5.8)
RBC # FLD: 12.5 % — SIGNIFICANT CHANGE UP (ref 10.3–14.5)
SODIUM SERPL-SCNC: 139 MMOL/L — SIGNIFICANT CHANGE UP (ref 135–145)
WBC # BLD: 12.87 K/UL — HIGH (ref 3.8–10.5)
WBC # FLD AUTO: 12.87 K/UL — HIGH (ref 3.8–10.5)

## 2020-01-25 PROCEDURE — 72125 CT NECK SPINE W/O DYE: CPT | Mod: 26

## 2020-01-25 PROCEDURE — 99221 1ST HOSP IP/OBS SF/LOW 40: CPT

## 2020-01-25 RX ORDER — EZETIMIBE 10 MG/1
10 TABLET ORAL ONCE
Refills: 0 | Status: COMPLETED | OUTPATIENT
Start: 2020-01-25 | End: 2020-01-25

## 2020-01-25 RX ADMIN — Medication 10 MILLIGRAM(S): at 06:17

## 2020-01-25 RX ADMIN — CYCLOBENZAPRINE HYDROCHLORIDE 5 MILLIGRAM(S): 10 TABLET, FILM COATED ORAL at 15:40

## 2020-01-25 RX ADMIN — POLYETHYLENE GLYCOL 3350 17 GRAM(S): 17 POWDER, FOR SOLUTION ORAL at 12:54

## 2020-01-25 RX ADMIN — Medication 1000 MILLIGRAM(S): at 13:25

## 2020-01-25 RX ADMIN — Medication 1000 MILLIGRAM(S): at 12:55

## 2020-01-25 RX ADMIN — Medication 10 MILLIGRAM(S): at 22:26

## 2020-01-25 RX ADMIN — Medication 2000 MILLIGRAM(S): at 00:32

## 2020-01-25 RX ADMIN — FINASTERIDE 5 MILLIGRAM(S): 5 TABLET, FILM COATED ORAL at 12:56

## 2020-01-25 RX ADMIN — CYCLOSPORINE 175 MILLIGRAM(S): 100 CAPSULE ORAL at 12:57

## 2020-01-25 RX ADMIN — Medication 10 MILLIGRAM(S): at 15:41

## 2020-01-25 RX ADMIN — Medication 10 MILLIGRAM(S): at 18:44

## 2020-01-25 RX ADMIN — Medication 4 MILLIGRAM(S): at 22:25

## 2020-01-25 RX ADMIN — Medication 4 MILLIGRAM(S): at 05:45

## 2020-01-25 RX ADMIN — Medication 1000 MILLIGRAM(S): at 17:18

## 2020-01-25 RX ADMIN — LISINOPRIL 15 MILLIGRAM(S): 2.5 TABLET ORAL at 06:17

## 2020-01-25 RX ADMIN — CYCLOBENZAPRINE HYDROCHLORIDE 5 MILLIGRAM(S): 10 TABLET, FILM COATED ORAL at 06:17

## 2020-01-25 RX ADMIN — Medication 1000 MILLIGRAM(S): at 00:32

## 2020-01-25 RX ADMIN — Medication 81 MILLIGRAM(S): at 12:54

## 2020-01-25 RX ADMIN — GABAPENTIN 300 MILLIGRAM(S): 400 CAPSULE ORAL at 06:18

## 2020-01-25 RX ADMIN — GABAPENTIN 300 MILLIGRAM(S): 400 CAPSULE ORAL at 18:45

## 2020-01-25 RX ADMIN — CYCLOBENZAPRINE HYDROCHLORIDE 5 MILLIGRAM(S): 10 TABLET, FILM COATED ORAL at 22:25

## 2020-01-25 RX ADMIN — Medication 1000 MILLIGRAM(S): at 17:48

## 2020-01-25 RX ADMIN — Medication 4 MILLIGRAM(S): at 17:18

## 2020-01-25 RX ADMIN — SENNA PLUS 2 TABLET(S): 8.6 TABLET ORAL at 22:25

## 2020-01-25 RX ADMIN — LATANOPROST 1 DROP(S): 0.05 SOLUTION/ DROPS OPHTHALMIC; TOPICAL at 22:26

## 2020-01-25 RX ADMIN — Medication 4 MILLIGRAM(S): at 12:57

## 2020-01-25 NOTE — CONSULT NOTE ADULT - ASSESSMENT
76 y/o male,  1 day s/p anterior arthorodesis corpectomy, decompression C4-&, DOS 1/24/2020. C4-C7. (23 Jan 2020 13:34)  Pt asleep but arousable, has no complaint, sleep well last night, has 2-4L NC O2.  No chest pain, no shortness of breath, no fever

## 2020-01-25 NOTE — PROGRESS NOTE ADULT - SUBJECTIVE AND OBJECTIVE BOX
Orthopedics Progress Note    Procedure: ACDF C5-C7, PARTIAL CORPECTOMY C6  Surgeon: MINO Pizarro. stable, laying in bed, pain well controlled. Denies any SOB/CP/nausea/vomiting/ numbness/tingling in b/l ues.     Vital Signs Last 24 Hrs  T(C): 36.6 (24 Jan 2020 19:26), Max: 36.9 (24 Jan 2020 18:00)  T(F): 97.9 (24 Jan 2020 19:26), Max: 98.5 (24 Jan 2020 18:00)  HR: 66 (25 Jan 2020 04:06) (60 - 88)  BP: 107/60 (25 Jan 2020 04:06) (107/60 - 171/84)  BP(mean): 113 (24 Jan 2020 18:00) (106 - 120)  RR: 19 (25 Jan 2020 04:06) (11 - 20)  SpO2: 98% (25 Jan 2020 04:06) (98% - 100%)      Dressing C/D/I with 1 drain     Pulses: Brachial/Radial 2+  SLT: intact M/U/R  Motor: /Finger Int/Wrist flexion/ext/Biceps/triceps/Delts 5/5                                     11.7   12.87 )-----------( 175      ( 25 Jan 2020 05:53 )             35.1           A/P: 75yoMale POD#1 s/p ACDF C5-C7, PARTIAL CORPECTOMY C6  - Stable  - Pain Control  - DVT ppx: asa 81  - Post op abx: ancef  - PT, WBS: wbat b/l ues  - F/U AM Labs  - f/u drain output

## 2020-01-25 NOTE — CONSULT NOTE ADULT - PROBLEM SELECTOR RECOMMENDATION 9
1 day s/p anterior arthrodesis corpectomy, decompression C4-&, DOS 1/24/2020 1 day s/p anterior arthrodesis corpectomy, decompression C4-7, DOS 1/24/2020

## 2020-01-25 NOTE — CONSULT NOTE ADULT - SUBJECTIVE AND OBJECTIVE BOX
Patient is a 75y old  Male who presents with a chief complaint of neck pain (25 Jan 2020 07:42)      HPI:  75y M with disequilibrium due to cord compression x 3-4 months worsened over time without improvement. Failed conservative treatments. Denies numbness, tingling. Ambulates with a cane. Pt denies any recent fevers/chills, chest pain, or shortness of breath. Pt had an unprovoked DVT 1 year ago. He currently takes Xarelto for this and his last dose was 1 week ago. He had an IVC filter placed 10 days ago.  1 day s/p anterior arthorodesis corpectomy, decompression C4-&, DOS 1/24/2020. C4-C7. (23 Jan 2020 13:34)  Pt asleep but arousable, has no complaint, sleep well last night, has 2-4 NC O2.  No chest pain, no shortness of breath, no fever    PAST MEDICAL & SURGICAL HISTORY:  Breast lump  Eczema  CAD (coronary artery disease): 5 stents  DVT (deep venous thrombosis)  Hernia  Pituitary adenoma  Neuropathy  Carotid stenosis, right  Iliac aneurysm  Vasovagal syndrome  LISA (obstructive sleep apnea)  BBB (bundle branch block): right  BPH (benign prostatic hyperplasia)  Hypertension  Cardiac angina  Breast lump: removed  Elective surgery: ivc filter1/14/2020  Elective surgery: prostate ablation  Elective surgery: colon polyps removal  H/O hernia repair  History of vasectomy  History of shoulder surgery: left  History of carpal tunnel release of both wrists  History of strabismus surgery  History of total knee replacement, left  History of coronary artery stent placement: x 5 stents  S/P angioplasty      FAMILY HISTORY:      SOCIAL HISTORY:  Smoking Status: [ ] Current, [ ] Former, [ ] Never  Pack Years:    MEDICATIONS:  Pulmonary:    Antimicrobials:    Anticoagulants:  aspirin  chewable 81 milliGRAM(s) Oral daily    Onc:    GI/:  bisacodyl 5 milliGRAM(s) Oral every 12 hours PRN  bisacodyl Suppository 10 milliGRAM(s) Rectal daily PRN  famotidine    Tablet 20 milliGRAM(s) Oral every 12 hours PRN  oxybutynin 10 milliGRAM(s) Oral two times a day  polyethylene glycol 3350 17 Gram(s) Oral daily  senna 2 Tablet(s) Oral at bedtime    Endocrine:  dexAMETHasone  Injectable 4 milliGRAM(s) IV Push every 6 hours  ezetimibe 10 milliGRAM(s) Oral daily  finasteride 5 milliGRAM(s) Oral daily    Cardiac:  lisinopril 15 milliGRAM(s) Oral daily    Other Medications:  acetaminophen   Tablet .. 1000 milliGRAM(s) Oral every 8 hours  cyclobenzaprine 5 milliGRAM(s) Oral three times a day  cycloSPORINE  , modified (GENGRAF) 175 milliGRAM(s) Oral daily  gabapentin 300 milliGRAM(s) Oral two times a day  HYDROmorphone  Injectable 0.5 milliGRAM(s) IV Push every 30 minutes PRN  lactated ringers. 1000 milliLiter(s) IV Continuous <Continuous>  latanoprost 0.005% Ophthalmic Solution 1 Drop(s) Left EYE at bedtime  metoclopramide Injectable 10 milliGRAM(s) IV Push every 8 hours  ondansetron Injectable 4 milliGRAM(s) IV Push every 6 hours PRN  oxyCODONE    IR 5 milliGRAM(s) Oral every 4 hours PRN      Allergies    Levaquin (Rash)  rifampin (Rash)    Intolerances        Review of Systems:   •	General: negative  •	Skin/Breast: negative  •	Ophthalmologic: negative  •	ENMT: negative  •	Respiratory and Thorax: negative  •	Cardiovascular: negative  •	Gastrointestinal: negative  •	Genitourinary: negative  •	Musculoskeletal: negative  •	Neurological: negative  •	Psychiatric: negative  •	Hematology/Lymphatics: negative  •	Endocrine: negative  •	Allergic/Immunologic: negative    Physical Exam:   • Constitutional:	Well-developed, well nourished  • Eyes:	EOMI; PERRL; no drainage or redness  • ENMT:	No oral lesions; no gross abnormalities  • Neck	no thyromegaly or nodules  • Breasts:	not examined  • Back:	No deformity or limitation of movement  • Respiratory:	Breath Sounds equal & clear to auscultation, no accessory muscle use  • Cardiovascular:	Regular rate & rhythm, normal S1, S2; no murmurs, gallops or rubs; no S3, S4  • Gastrointestinal:	Soft, non-tender, no hepatosplenomegaly, normal bowel sounds  • Genitourinary:	not examined  • Rectal: not examined  • Extremities:	No cyanosis, clubbing or edema  • Vascular:	Equal and normal pulses ( dorsalis pedis)  • Neurologica:l	not examined  • Skin:	No lesions; no rash  • Lymph Nodes:	No lymphadedenopathy  • Musculoskeletal:	No joint pain, swelling or deformity; no limitation of movement      Vital Signs Last 24 Hrs  T(C): 36.3 (25 Jan 2020 09:41), Max: 36.9 (24 Jan 2020 18:00)  T(F): 97.4 (25 Jan 2020 09:41), Max: 98.5 (24 Jan 2020 18:00)  HR: 69 (25 Jan 2020 09:41) (60 - 88)  BP: 125/62 (25 Jan 2020 09:41) (107/60 - 171/84)  BP(mean): 113 (24 Jan 2020 18:00) (106 - 120)  RR: 17 (25 Jan 2020 09:41) (11 - 20)  SpO2: 95% (25 Jan 2020 09:41) (95% - 100%)    01-24 @ 07:01 - 01-25 @ 07:00  --------------------------------------------------------  IN: 400 mL / OUT: 2545 mL / NET: -2145 mL    01-25 @ 07:01 - 01-25 @ 11:09  --------------------------------------------------------  IN: 0 mL / OUT: 1000 mL / NET: -1000 mL          LABS:      CBC Full  -  ( 25 Jan 2020 05:53 )  WBC Count : 12.87 K/uL  RBC Count : 3.59 M/uL  Hemoglobin : 11.7 g/dL  Hematocrit : 35.1 %  Platelet Count - Automated : 175 K/uL  Mean Cell Volume : 97.8 fl  Mean Cell Hemoglobin : 32.6 pg  Mean Cell Hemoglobin Concentration : 33.3 gm/dL  Auto Neutrophil # : 11.94 K/uL  Auto Lymphocyte # : 0.14 K/uL  Auto Monocyte # : 0.65 K/uL  Auto Eosinophil # : 0.00 K/uL  Auto Basophil # : 0.01 K/uL  Auto Neutrophil % : 92.7 %  Auto Lymphocyte % : 1.1 %  Auto Monocyte % : 5.1 %  Auto Eosinophil % : 0.0 %  Auto Basophil % : 0.1 %    01-25    139  |  105  |  19  ----------------------------<  153<H>  4.4   |  23  |  0.97    Ca    8.9      25 Jan 2020 05:53      PT/INR - ( 24 Jan 2020 14:02 )   PT: 13.5 sec;   INR: 1.18          PTT - ( 24 Jan 2020 14:02 )  PTT:40.5 sec                  RADIOLOGY & ADDITIONAL STUDIES (The following images were personally reviewed):

## 2020-01-26 LAB
ANION GAP SERPL CALC-SCNC: 10 MMOL/L — SIGNIFICANT CHANGE UP (ref 5–17)
BASOPHILS # BLD AUTO: 0.01 K/UL — SIGNIFICANT CHANGE UP (ref 0–0.2)
BASOPHILS NFR BLD AUTO: 0.1 % — SIGNIFICANT CHANGE UP (ref 0–2)
BUN SERPL-MCNC: 25 MG/DL — HIGH (ref 7–23)
CALCIUM SERPL-MCNC: 8.9 MG/DL — SIGNIFICANT CHANGE UP (ref 8.4–10.5)
CHLORIDE SERPL-SCNC: 103 MMOL/L — SIGNIFICANT CHANGE UP (ref 96–108)
CO2 SERPL-SCNC: 24 MMOL/L — SIGNIFICANT CHANGE UP (ref 22–31)
CREAT SERPL-MCNC: 0.98 MG/DL — SIGNIFICANT CHANGE UP (ref 0.5–1.3)
EOSINOPHIL # BLD AUTO: 0 K/UL — SIGNIFICANT CHANGE UP (ref 0–0.5)
EOSINOPHIL NFR BLD AUTO: 0 % — SIGNIFICANT CHANGE UP (ref 0–6)
GLUCOSE SERPL-MCNC: 169 MG/DL — HIGH (ref 70–99)
HCT VFR BLD CALC: 35.9 % — LOW (ref 39–50)
HGB BLD-MCNC: 11.3 G/DL — LOW (ref 13–17)
IMM GRANULOCYTES NFR BLD AUTO: 0.7 % — SIGNIFICANT CHANGE UP (ref 0–1.5)
LYMPHOCYTES # BLD AUTO: 0.14 K/UL — LOW (ref 1–3.3)
LYMPHOCYTES # BLD AUTO: 1.2 % — LOW (ref 13–44)
MCHC RBC-ENTMCNC: 31.3 PG — SIGNIFICANT CHANGE UP (ref 27–34)
MCHC RBC-ENTMCNC: 31.5 GM/DL — LOW (ref 32–36)
MCV RBC AUTO: 99.4 FL — SIGNIFICANT CHANGE UP (ref 80–100)
MONOCYTES # BLD AUTO: 0.51 K/UL — SIGNIFICANT CHANGE UP (ref 0–0.9)
MONOCYTES NFR BLD AUTO: 4.3 % — SIGNIFICANT CHANGE UP (ref 2–14)
NEUTROPHILS # BLD AUTO: 11 K/UL — HIGH (ref 1.8–7.4)
NEUTROPHILS NFR BLD AUTO: 93.7 % — HIGH (ref 43–77)
NRBC # BLD: 0 /100 WBCS — SIGNIFICANT CHANGE UP (ref 0–0)
PLATELET # BLD AUTO: 201 K/UL — SIGNIFICANT CHANGE UP (ref 150–400)
POTASSIUM SERPL-MCNC: 4.4 MMOL/L — SIGNIFICANT CHANGE UP (ref 3.5–5.3)
POTASSIUM SERPL-SCNC: 4.4 MMOL/L — SIGNIFICANT CHANGE UP (ref 3.5–5.3)
RBC # BLD: 3.61 M/UL — LOW (ref 4.2–5.8)
RBC # FLD: 12.9 % — SIGNIFICANT CHANGE UP (ref 10.3–14.5)
SODIUM SERPL-SCNC: 137 MMOL/L — SIGNIFICANT CHANGE UP (ref 135–145)
WBC # BLD: 11.74 K/UL — HIGH (ref 3.8–10.5)
WBC # FLD AUTO: 11.74 K/UL — HIGH (ref 3.8–10.5)

## 2020-01-26 PROCEDURE — 93970 EXTREMITY STUDY: CPT | Mod: 26

## 2020-01-26 PROCEDURE — 99232 SBSQ HOSP IP/OBS MODERATE 35: CPT

## 2020-01-26 PROCEDURE — 72141 MRI NECK SPINE W/O DYE: CPT | Mod: 26

## 2020-01-26 RX ORDER — EZETIMIBE 10 MG/1
10 TABLET ORAL DAILY
Refills: 0 | Status: COMPLETED | OUTPATIENT
Start: 2020-01-26 | End: 2020-01-26

## 2020-01-26 RX ADMIN — LISINOPRIL 15 MILLIGRAM(S): 2.5 TABLET ORAL at 06:11

## 2020-01-26 RX ADMIN — LATANOPROST 1 DROP(S): 0.05 SOLUTION/ DROPS OPHTHALMIC; TOPICAL at 21:26

## 2020-01-26 RX ADMIN — CYCLOSPORINE 175 MILLIGRAM(S): 100 CAPSULE ORAL at 13:41

## 2020-01-26 RX ADMIN — CYCLOBENZAPRINE HYDROCHLORIDE 5 MILLIGRAM(S): 10 TABLET, FILM COATED ORAL at 21:26

## 2020-01-26 RX ADMIN — EZETIMIBE 10 MILLIGRAM(S): 10 TABLET ORAL at 18:22

## 2020-01-26 RX ADMIN — CYCLOBENZAPRINE HYDROCHLORIDE 5 MILLIGRAM(S): 10 TABLET, FILM COATED ORAL at 13:39

## 2020-01-26 RX ADMIN — Medication 10 MILLIGRAM(S): at 06:11

## 2020-01-26 RX ADMIN — FINASTERIDE 5 MILLIGRAM(S): 5 TABLET, FILM COATED ORAL at 13:39

## 2020-01-26 RX ADMIN — Medication 10 MILLIGRAM(S): at 21:26

## 2020-01-26 RX ADMIN — CYCLOBENZAPRINE HYDROCHLORIDE 5 MILLIGRAM(S): 10 TABLET, FILM COATED ORAL at 06:12

## 2020-01-26 RX ADMIN — GABAPENTIN 300 MILLIGRAM(S): 400 CAPSULE ORAL at 18:22

## 2020-01-26 RX ADMIN — Medication 1000 MILLIGRAM(S): at 18:22

## 2020-01-26 RX ADMIN — Medication 10 MILLIGRAM(S): at 18:22

## 2020-01-26 RX ADMIN — Medication 1000 MILLIGRAM(S): at 18:52

## 2020-01-26 RX ADMIN — Medication 10 MILLIGRAM(S): at 06:12

## 2020-01-26 RX ADMIN — SENNA PLUS 2 TABLET(S): 8.6 TABLET ORAL at 21:26

## 2020-01-26 RX ADMIN — Medication 10 MILLIGRAM(S): at 13:40

## 2020-01-26 RX ADMIN — GABAPENTIN 300 MILLIGRAM(S): 400 CAPSULE ORAL at 06:11

## 2020-01-26 RX ADMIN — POLYETHYLENE GLYCOL 3350 17 GRAM(S): 17 POWDER, FOR SOLUTION ORAL at 13:38

## 2020-01-26 RX ADMIN — Medication 81 MILLIGRAM(S): at 13:39

## 2020-01-26 NOTE — PHYSICAL THERAPY INITIAL EVALUATION ADULT - ADDITIONAL COMMENTS
Patient lives with spouse in ramp/elevator access, no steps upon entry, utilizing sc for all outdoor mobility, indoors patient is cruising on furniture without a device. Denies falls, endorsed one fall on [place] while overseas in [place] years ago. Sleeps in a recliner chair. No HHA services

## 2020-01-26 NOTE — PHYSICAL THERAPY INITIAL EVALUATION ADULT - REHAB POTENTIAL, PT EVAL
Patient independent with all mobility skills cleared from inpatient PT, educated to increase OOB intervals and ambulation with unit staff/none

## 2020-01-26 NOTE — PROGRESS NOTE ADULT - SUBJECTIVE AND OBJECTIVE BOX
Orthopedics Progress Note    Procedure: ACDF C5-C7, PARTIAL CORPECTOMY C6  Surgeon: MINO Pizarro. stable, laying in bed, pain well controlled. Denies any SOB/CP/nausea/vomiting/ numbness/tingling in b/l ues.     Vital Signs Last 24 Hrs  T(C): 36.4 (25 Jan 2020 21:42), Max: 36.4 (25 Jan 2020 14:13)  T(F): 97.5 (25 Jan 2020 21:42), Max: 97.6 (25 Jan 2020 14:13)  HR: 63 (26 Jan 2020 05:52) (63 - 69)  BP: 140/72 (26 Jan 2020 05:52) (108/55 - 140/72)  BP(mean): --  RR: 16 (26 Jan 2020 05:52) (16 - 18)  SpO2: 94% (26 Jan 2020 05:52) (94% - 98%)      Dressing C/D/I with 1 drain     Pulses: Brachial/Radial 2+  SLT: intact M/U/R  Motor: /Finger Int/Wrist flexion/ext/Biceps/triceps/Delts 5/5                                     11.3   11.74 )-----------( 201      ( 26 Jan 2020 06:22 )             35.9           A/P: 75yoMale POD#2 s/p ACDF C5-C7, PARTIAL CORPECTOMY C6  - Stable  - Pain Control  - DVT ppx: asa 81  - Post op abx: ancef  - PT, WBS: wbat b/l ues  - F/U AM Labs  - f/u drain output  - f/u MRI scan from today

## 2020-01-26 NOTE — PROGRESS NOTE ADULT - SUBJECTIVE AND OBJECTIVE BOX
Interval Events: Reviewed  Patient seen and examined at bedside.    Patient is a 75y old  Male who presents with a chief complaint of neck pain (25 Jan 2020 11:07)      PAST MEDICAL & SURGICAL HISTORY:  Breast lump  Eczema  CAD (coronary artery disease): 5 stents  DVT (deep venous thrombosis)  Hernia  Pituitary adenoma  Neuropathy  Carotid stenosis, right  Iliac aneurysm  Vasovagal syndrome  LISA (obstructive sleep apnea)  BBB (bundle branch block): right  BPH (benign prostatic hyperplasia)  Hypertension  Cardiac angina  Breast lump: removed  Elective surgery: ivc filter1/14/2020  Elective surgery: prostate ablation  Elective surgery: colon polyps removal  H/O hernia repair  History of vasectomy  History of shoulder surgery: left  History of carpal tunnel release of both wrists  History of strabismus surgery  History of total knee replacement, left  History of coronary artery stent placement: x 5 stents  S/P angioplasty      MEDICATIONS:  Pulmonary:    Antimicrobials:    Anticoagulants:  aspirin  chewable 81 milliGRAM(s) Oral daily    Cardiac:  lisinopril 15 milliGRAM(s) Oral daily      Allergies    Levaquin (Rash)  rifampin (Rash)    Intolerances        Vital Signs Last 24 Hrs  T(C): 36.4 (25 Jan 2020 21:42), Max: 36.4 (25 Jan 2020 14:13)  T(F): 97.5 (25 Jan 2020 21:42), Max: 97.6 (25 Jan 2020 14:13)  HR: 63 (26 Jan 2020 05:52) (63 - 69)  BP: 140/72 (26 Jan 2020 05:52) (108/55 - 140/72)  BP(mean): --  RR: 16 (26 Jan 2020 05:52) (16 - 18)  SpO2: 94% (26 Jan 2020 05:52) (94% - 98%)    01-25 @ 07:01  -  01-26 @ 07:00  --------------------------------------------------------  IN: 400 mL / OUT: 2375 mL / NET: -1975 mL          Review of Systems:   •	General: negative  •	Skin/Breast: negative  •	Ophthalmologic: negative  •	ENMT: negative  •	Respiratory and Thorax: negative  •	Cardiovascular: negative  •	Gastrointestinal: negative  •	Genitourinary: negative  •	Musculoskeletal: negative  •	Neurological: negative  •	Psychiatric: negative  •	Hematology/Lymphatics: negative  •	Endocrine: negative  •	Allergic/Immunologic: negative    Physical Exam:   • Constitutional:	Well-developed, well nourished  • Eyes:	EOMI; PERRL; no drainage or redness  • ENMT:	No oral lesions; no gross abnormalities  • Neck	No bruits; no thyromegaly or nodules  • Breasts:	not examined  • Back:	No deformity or limitation of movement  • Respiratory:	Breath Sounds equal & clear to percussion & auscultation, no accessory muscle use  • Cardiovascular:	Regular rate & rhythm, normal S1, S2; no murmurs, gallops or rubs; no S3, S4  • Gastrointestinal:	Soft, non-tender, no hepatosplenomegaly, normal bowel sounds  • Genitourinary:	not examined  • Rectal: not examined  • Extremities:	No cyanosis, clubbing or edema  • Vascular:	Equal and normal pulses (carotid, femoral, dorsalis pedis)  • Neurologica:l	not examined  • Skin:	No lesions; no rash  • Lymph Nodes:	No lymphadedenopathy  • Musculoskeletal:	No joint pain, swelling or deformity; no limitation of movement        LABS:      CBC Full  -  ( 26 Jan 2020 06:22 )  WBC Count : 11.74 K/uL  RBC Count : 3.61 M/uL  Hemoglobin : 11.3 g/dL  Hematocrit : 35.9 %  Platelet Count - Automated : 201 K/uL  Mean Cell Volume : 99.4 fl  Mean Cell Hemoglobin : 31.3 pg  Mean Cell Hemoglobin Concentration : 31.5 gm/dL  Auto Neutrophil # : 11.00 K/uL  Auto Lymphocyte # : 0.14 K/uL  Auto Monocyte # : 0.51 K/uL  Auto Eosinophil # : 0.00 K/uL  Auto Basophil # : 0.01 K/uL  Auto Neutrophil % : 93.7 %  Auto Lymphocyte % : 1.2 %  Auto Monocyte % : 4.3 %  Auto Eosinophil % : 0.0 %  Auto Basophil % : 0.1 %    01-26    137  |  103  |  25<H>  ----------------------------<  169<H>  4.4   |  24  |  0.98    Ca    8.9      26 Jan 2020 06:22      PT/INR - ( 24 Jan 2020 14:02 )   PT: 13.5 sec;   INR: 1.18          PTT - ( 24 Jan 2020 14:02 )  PTT:40.5 sec                    RADIOLOGY & ADDITIONAL STUDIES (The following images were personally reviewed):  Lovelace:                                     No  Urine output:                       adequate  DVT prophylaxis:                 Yes  Flattus:                                  Yes  Bowel movement:              No Interval Events: Reviewed  Patient seen and examined at bedside.    Patient is a 75y old  Male who presents with a chief complaint of neck pain (25 Jan 2020 11:07)  74 y/o male,  2 day s/p anterior arthorodesis corpectomy, decompression C4-7, DOS 1/24/2020. C4-C7. (23 Jan 2020 13:34)  Pt asleep but arousable, has no complaint, sleep well last night, O2 saturation at 94% RA.  No chest pain, no shortness of breath, no fever        PAST MEDICAL & SURGICAL HISTORY:  Breast lump  Eczema  CAD (coronary artery disease): 5 stents  DVT (deep venous thrombosis)  Hernia  Pituitary adenoma  Neuropathy  Carotid stenosis, right  Iliac aneurysm  Vasovagal syndrome  LISA (obstructive sleep apnea)  BBB (bundle branch block): right  BPH (benign prostatic hyperplasia)  Hypertension  Cardiac angina  Breast lump: removed  Elective surgery: ivc filter1/14/2020  Elective surgery: prostate ablation  Elective surgery: colon polyps removal  H/O hernia repair  History of vasectomy  History of shoulder surgery: left  History of carpal tunnel release of both wrists  History of strabismus surgery  History of total knee replacement, left  History of coronary artery stent placement: x 5 stents  S/P angioplasty      MEDICATIONS:  Pulmonary:    Antimicrobials:    Anticoagulants:  aspirin  chewable 81 milliGRAM(s) Oral daily    Cardiac:  lisinopril 15 milliGRAM(s) Oral daily      Allergies    Levaquin (Rash)  rifampin (Rash)    Intolerances        Vital Signs Last 24 Hrs  T(C): 36.4 (25 Jan 2020 21:42), Max: 36.4 (25 Jan 2020 14:13)  T(F): 97.5 (25 Jan 2020 21:42), Max: 97.6 (25 Jan 2020 14:13)  HR: 63 (26 Jan 2020 05:52) (63 - 69)  BP: 140/72 (26 Jan 2020 05:52) (108/55 - 140/72)  BP(mean): --  RR: 16 (26 Jan 2020 05:52) (16 - 18)  SpO2: 94% (26 Jan 2020 05:52) (94% - 98%)    01-25 @ 07:01  -  01-26 @ 07:00  --------------------------------------------------------  IN: 400 mL / OUT: 2375 mL / NET: -1975 mL          Review of Systems:   •	General: negative  •	Skin/Breast: negative  •	Ophthalmologic: negative  •	ENMT: negative  •	Respiratory and Thorax: negative  •	Cardiovascular: negative  •	Gastrointestinal: negative  •	Genitourinary: negative  •	Musculoskeletal: negative  •	Neurological: negative  •	Psychiatric: negative  •	Hematology/Lymphatics: negative  •	Endocrine: negative  •	Allergic/Immunologic: negative    Physical Exam:   • Constitutional:	Well-developed, well nourished  • Eyes:	EOMI; PERRL; no drainage or redness  • ENMT:	No oral lesions; no gross abnormalities  • Neck	no thyromegaly or nodules  • Breasts:	not examined  • Back:	No deformity or limitation of movement  • Respiratory:	Breath Sounds equal & clear to auscultation, no accessory muscle use  • Cardiovascular:	Regular rate & rhythm, normal S1, S2; no murmurs, gallops or rubs; no S3, S4  • Gastrointestinal:	Soft, non-tender, no hepatosplenomegaly, normal bowel sounds  • Genitourinary:	not examined  • Rectal: not examined  • Extremities:	No cyanosis, clubbing or edema  • Vascular:	Equal and normal pulses ( dorsalis pedis)  • Neurologica:l	not examined  • Skin:	No lesions; no rash  • Lymph Nodes:	No lymphadedenopathy  • Musculoskeletal:	No joint pain, swelling or deformity; no limitation of movement        LABS:      CBC Full  -  ( 26 Jan 2020 06:22 )  WBC Count : 11.74 K/uL  RBC Count : 3.61 M/uL  Hemoglobin : 11.3 g/dL  Hematocrit : 35.9 %  Platelet Count - Automated : 201 K/uL  Mean Cell Volume : 99.4 fl  Mean Cell Hemoglobin : 31.3 pg  Mean Cell Hemoglobin Concentration : 31.5 gm/dL  Auto Neutrophil # : 11.00 K/uL  Auto Lymphocyte # : 0.14 K/uL  Auto Monocyte # : 0.51 K/uL  Auto Eosinophil # : 0.00 K/uL  Auto Basophil # : 0.01 K/uL  Auto Neutrophil % : 93.7 %  Auto Lymphocyte % : 1.2 %  Auto Monocyte % : 4.3 %  Auto Eosinophil % : 0.0 %  Auto Basophil % : 0.1 %    01-26    137  |  103  |  25<H>  ----------------------------<  169<H>  4.4   |  24  |  0.98    Ca    8.9      26 Jan 2020 06:22      PT/INR - ( 24 Jan 2020 14:02 )   PT: 13.5 sec;   INR: 1.18          PTT - ( 24 Jan 2020 14:02 )  PTT:40.5 sec                    RADIOLOGY & ADDITIONAL STUDIES (The following images were personally reviewed):  Lovelace:                                     No  Urine output:                       adequate  DVT prophylaxis:                 Yes  Flattus:                                  Yes  Bowel movement:              No

## 2020-01-26 NOTE — PROGRESS NOTE ADULT - ASSESSMENT
74 y/o male,  2 day s/p anterior arthorodesis corpectomy, decompression C4-&, DOS 1/24/2020. C4-C7. (23 Jan 2020 13:34)  Pt asleep but arousable, has no complaint, sleep well last night, has 2-4L NC O2.  No chest pain, no shortness of breath, no fever 74 y/o male,  2 day s/p anterior arthorodesis corpectomy, decompression C4-7, DOS 1/24/2020. C4-C7. (23 Jan 2020 13:34)  Pt asleep but arousable, has no complaint, sleep well last night, O2 saturaration 94 % RA.  No chest pain, no shortness of breath, no fever

## 2020-01-26 NOTE — PHYSICAL THERAPY INITIAL EVALUATION ADULT - MODALITIES TREATMENT COMMENTS
Denies vision changes reading clock on wall, EOMI, no noted nystagmus, cleared from inpatient PT due to functional independence, patient demo

## 2020-01-27 ENCOUNTER — TRANSCRIPTION ENCOUNTER (OUTPATIENT)
Age: 75
End: 2020-01-27

## 2020-01-27 VITALS
TEMPERATURE: 98 F | SYSTOLIC BLOOD PRESSURE: 111 MMHG | OXYGEN SATURATION: 97 % | DIASTOLIC BLOOD PRESSURE: 58 MMHG | RESPIRATION RATE: 16 BRPM | HEART RATE: 77 BPM

## 2020-01-27 LAB
ANION GAP SERPL CALC-SCNC: 7 MMOL/L — SIGNIFICANT CHANGE UP (ref 5–17)
BASOPHILS # BLD AUTO: 0.01 K/UL — SIGNIFICANT CHANGE UP (ref 0–0.2)
BASOPHILS NFR BLD AUTO: 0.2 % — SIGNIFICANT CHANGE UP (ref 0–2)
BUN SERPL-MCNC: 31 MG/DL — HIGH (ref 7–23)
CALCIUM SERPL-MCNC: 8.8 MG/DL — SIGNIFICANT CHANGE UP (ref 8.4–10.5)
CHLORIDE SERPL-SCNC: 104 MMOL/L — SIGNIFICANT CHANGE UP (ref 96–108)
CO2 SERPL-SCNC: 25 MMOL/L — SIGNIFICANT CHANGE UP (ref 22–31)
CREAT SERPL-MCNC: 1.06 MG/DL — SIGNIFICANT CHANGE UP (ref 0.5–1.3)
EOSINOPHIL # BLD AUTO: 0.06 K/UL — SIGNIFICANT CHANGE UP (ref 0–0.5)
EOSINOPHIL NFR BLD AUTO: 1 % — SIGNIFICANT CHANGE UP (ref 0–6)
GLUCOSE SERPL-MCNC: 94 MG/DL — SIGNIFICANT CHANGE UP (ref 70–99)
HCT VFR BLD CALC: 35.3 % — LOW (ref 39–50)
HGB BLD-MCNC: 11.2 G/DL — LOW (ref 13–17)
IMM GRANULOCYTES NFR BLD AUTO: 0.8 % — SIGNIFICANT CHANGE UP (ref 0–1.5)
LYMPHOCYTES # BLD AUTO: 0.26 K/UL — LOW (ref 1–3.3)
LYMPHOCYTES # BLD AUTO: 4.3 % — LOW (ref 13–44)
MCHC RBC-ENTMCNC: 31.7 GM/DL — LOW (ref 32–36)
MCHC RBC-ENTMCNC: 32.1 PG — SIGNIFICANT CHANGE UP (ref 27–34)
MCV RBC AUTO: 101.1 FL — HIGH (ref 80–100)
MONOCYTES # BLD AUTO: 0.58 K/UL — SIGNIFICANT CHANGE UP (ref 0–0.9)
MONOCYTES NFR BLD AUTO: 9.6 % — SIGNIFICANT CHANGE UP (ref 2–14)
NEUTROPHILS # BLD AUTO: 5.06 K/UL — SIGNIFICANT CHANGE UP (ref 1.8–7.4)
NEUTROPHILS NFR BLD AUTO: 84.1 % — HIGH (ref 43–77)
NRBC # BLD: 0 /100 WBCS — SIGNIFICANT CHANGE UP (ref 0–0)
PLATELET # BLD AUTO: 195 K/UL — SIGNIFICANT CHANGE UP (ref 150–400)
POTASSIUM SERPL-MCNC: 4.2 MMOL/L — SIGNIFICANT CHANGE UP (ref 3.5–5.3)
POTASSIUM SERPL-SCNC: 4.2 MMOL/L — SIGNIFICANT CHANGE UP (ref 3.5–5.3)
RBC # BLD: 3.49 M/UL — LOW (ref 4.2–5.8)
RBC # FLD: 13.5 % — SIGNIFICANT CHANGE UP (ref 10.3–14.5)
SODIUM SERPL-SCNC: 136 MMOL/L — SIGNIFICANT CHANGE UP (ref 135–145)
WBC # BLD: 6.02 K/UL — SIGNIFICANT CHANGE UP (ref 3.8–10.5)
WBC # FLD AUTO: 6.02 K/UL — SIGNIFICANT CHANGE UP (ref 3.8–10.5)

## 2020-01-27 PROCEDURE — 99232 SBSQ HOSP IP/OBS MODERATE 35: CPT

## 2020-01-27 RX ORDER — SENNA PLUS 8.6 MG/1
2 TABLET ORAL
Qty: 0 | Refills: 0 | DISCHARGE
Start: 2020-01-27

## 2020-01-27 RX ORDER — CYCLOBENZAPRINE HYDROCHLORIDE 10 MG/1
1 TABLET, FILM COATED ORAL
Qty: 21 | Refills: 0
Start: 2020-01-27 | End: 2020-02-02

## 2020-01-27 RX ORDER — AZATHIOPRINE 100 MG/1
1 TABLET ORAL
Qty: 0 | Refills: 0 | DISCHARGE

## 2020-01-27 RX ORDER — HYDROMORPHONE HYDROCHLORIDE 2 MG/ML
0.5 INJECTION INTRAMUSCULAR; INTRAVENOUS; SUBCUTANEOUS ONCE
Refills: 0 | Status: DISCONTINUED | OUTPATIENT
Start: 2020-01-27 | End: 2020-01-27

## 2020-01-27 RX ORDER — CELECOXIB 200 MG/1
1 CAPSULE ORAL
Qty: 0 | Refills: 0 | DISCHARGE

## 2020-01-27 RX ORDER — RIVAROXABAN 15 MG-20MG
1 KIT ORAL
Qty: 0 | Refills: 0 | DISCHARGE

## 2020-01-27 RX ORDER — POLYETHYLENE GLYCOL 3350 17 G/17G
17 POWDER, FOR SOLUTION ORAL
Qty: 0 | Refills: 0 | DISCHARGE
Start: 2020-01-27

## 2020-01-27 RX ADMIN — Medication 1000 MILLIGRAM(S): at 00:57

## 2020-01-27 RX ADMIN — GABAPENTIN 300 MILLIGRAM(S): 400 CAPSULE ORAL at 06:02

## 2020-01-27 RX ADMIN — HYDROMORPHONE HYDROCHLORIDE 0.5 MILLIGRAM(S): 2 INJECTION INTRAMUSCULAR; INTRAVENOUS; SUBCUTANEOUS at 11:50

## 2020-01-27 RX ADMIN — LISINOPRIL 15 MILLIGRAM(S): 2.5 TABLET ORAL at 06:02

## 2020-01-27 RX ADMIN — FINASTERIDE 5 MILLIGRAM(S): 5 TABLET, FILM COATED ORAL at 13:14

## 2020-01-27 RX ADMIN — Medication 1000 MILLIGRAM(S): at 09:37

## 2020-01-27 RX ADMIN — Medication 160 MILLIGRAM(S): at 13:39

## 2020-01-27 RX ADMIN — CYCLOSPORINE 175 MILLIGRAM(S): 100 CAPSULE ORAL at 15:18

## 2020-01-27 RX ADMIN — POLYETHYLENE GLYCOL 3350 17 GRAM(S): 17 POWDER, FOR SOLUTION ORAL at 13:38

## 2020-01-27 RX ADMIN — Medication 10 MILLIGRAM(S): at 06:03

## 2020-01-27 RX ADMIN — Medication 1000 MILLIGRAM(S): at 10:53

## 2020-01-27 RX ADMIN — CYCLOBENZAPRINE HYDROCHLORIDE 5 MILLIGRAM(S): 10 TABLET, FILM COATED ORAL at 06:02

## 2020-01-27 NOTE — OCCUPATIONAL THERAPY INITIAL EVALUATION ADULT - GENERAL OBSERVATIONS, REHAB EVAL
R hand dominant, patient cleared for OT by ELIJAH Hoff. Patient received semi-supine, NAD, +miami J collar, +hemovac, +heplock, +tele.

## 2020-01-27 NOTE — DISCHARGE NOTE NURSING/CASE MANAGEMENT/SOCIAL WORK - PATIENT PORTAL LINK FT
You can access the FollowMyHealth Patient Portal offered by [facility] by registering at the following website: [email or web address]. By joining [facility]’s FollowMyHealth portal, you will also be able to view your health information using other applications (apps) compatible with our system.

## 2020-01-27 NOTE — DISCHARGE NOTE PROVIDER - NSDCCPCAREPLAN_GEN_ALL_CORE_FT
PRINCIPAL DISCHARGE DIAGNOSIS  Diagnosis: Spinal stenosis in cervical region  Assessment and Plan of Treatment: Spinal stenosis in cervical region

## 2020-01-27 NOTE — OCCUPATIONAL THERAPY INITIAL EVALUATION ADULT - SPECIAL TRAINING, OT EVAL
Patient educated on spinal precautions, use of log roll for bed mobility. Patient able to demo independently.

## 2020-01-27 NOTE — OCCUPATIONAL THERAPY INITIAL EVALUATION ADULT - NS ASR OT EQUIP NEEDS DISCH
commode ordered from [facility], patient w/ SC at bedside, PT ordered RW

## 2020-01-27 NOTE — OCCUPATIONAL THERAPY INITIAL EVALUATION ADULT - MD ORDER
Per chart, 75y F with disequilibrium due to cord compression x 3-4 months worsened over time without improvement. Failed conservative treatments. Denies numbness, tingling. Ambulates with a cane. Pt denies any recent fevers/chills, chest pain, or shortness of breath. Pt had an unprovoked DVT 1 year ago. He currently takes Xarelto for this and his last dose was 1 week ago. He had an IVC filter placed 10 days ago.  ACDF C5-7, corpectomy 01/24/2020.

## 2020-01-27 NOTE — DISCHARGE NOTE PROVIDER - HOSPITAL COURSE
Admit    OR- Cervical cord compression with myelopathy; Corpectomy, spine, cervical C6 and partial C5 1/24/20    Periop Antibx    DVT ppx    PT     Pain mgt

## 2020-01-27 NOTE — OCCUPATIONAL THERAPY INITIAL EVALUATION ADULT - PLANNED THERAPY INTERVENTIONS, OT EVAL
balance training/motor coordination training/transfer training/strengthening/fine motor coordination training/neuromuscular re-education/ROM/ADL retraining/bed mobility training

## 2020-01-27 NOTE — PROGRESS NOTE ADULT - SUBJECTIVE AND OBJECTIVE BOX
Neurology Follow up note    Name  STEVAN STODDARD    HPI:  75y F with disequilibrium due to cord compression x 3-4 months worsened over time without improvement. Failed conservative treatments. Denies numbness, tingling. Ambulates with a cane. Pt denies any recent fevers/chills, chest pain, or shortness of breath. Pt had an unprovoked DVT 1 year ago. He currently takes Xarelto for this and his last dose was 1 week ago. He had an IVC filter placed 10 days ago. Presents today for elective Anterior Arthrodesis, corpectomy-decompression: C4-C7. (23 Jan 2020 13:34)      Interval History - neck pain - no new radicular symptoms - no numbness in the UE/LE         REVIEW OF SYSTEMS    Vital Signs Last 24 Hrs  T(C): 37.3 (27 Jan 2020 05:54), Max: 37.3 (27 Jan 2020 05:54)  T(F): 99.1 (27 Jan 2020 05:54), Max: 99.1 (27 Jan 2020 05:54)  HR: 65 (27 Jan 2020 05:54) (56 - 88)  BP: 114/71 (27 Jan 2020 05:54) (104/59 - 133/78)  BP(mean): --  RR: 16 (27 Jan 2020 05:54) (16 - 18)  SpO2: 96% (27 Jan 2020 05:54) (94% - 97%)    Physical Exam-     Mental Status- awake and alert    Cranial Nerves- full EOM    Gait and station- N/A    Motor- no foot drop    Reflexes- intact    Sensation- no sensory level    Coordination- no tremors    Vascular - no bruits    Medications  acetaminophen   Tablet .. 1000 milliGRAM(s) Oral every 8 hours  artificial tears (preservative free) Ophthalmic Solution 1 Drop(s) Both EYES two times a day PRN  aspirin  chewable 81 milliGRAM(s) Oral daily  atorvastatin 20 milliGRAM(s) Oral once  bisacodyl 5 milliGRAM(s) Oral every 12 hours PRN  bisacodyl Suppository 10 milliGRAM(s) Rectal daily PRN  cyclobenzaprine 5 milliGRAM(s) Oral three times a day  cycloSPORINE  , modified (GENGRAF) 175 milliGRAM(s) Oral daily  famotidine    Tablet 20 milliGRAM(s) Oral every 12 hours PRN  finasteride 5 milliGRAM(s) Oral daily  gabapentin 300 milliGRAM(s) Oral two times a day  HYDROmorphone  Injectable 0.5 milliGRAM(s) IV Push every 30 minutes PRN  lactated ringers. 1000 milliLiter(s) IV Continuous <Continuous>  latanoprost 0.005% Ophthalmic Solution 1 Drop(s) Left EYE at bedtime  lisinopril 15 milliGRAM(s) Oral daily  metoclopramide Injectable 10 milliGRAM(s) IV Push every 8 hours  ondansetron Injectable 4 milliGRAM(s) IV Push every 6 hours PRN  oxybutynin 10 milliGRAM(s) Oral two times a day  oxyCODONE    IR 5 milliGRAM(s) Oral every 4 hours PRN  polyethylene glycol 3350 17 Gram(s) Oral daily  senna 2 Tablet(s) Oral at bedtime  trimethoprim  40 mG/sulfamethoxazole 200 mG Suspension 160 milliGRAM(s) Oral once      Lab      Radiology    Assessment- Cervical radiculopathy    Plan as per NS

## 2020-01-27 NOTE — OCCUPATIONAL THERAPY INITIAL EVALUATION ADULT - ADDITIONAL COMMENTS
Patient reports PTA, required SC for functional mobility task, would swim at the gym approximately 3 times per week, however increased difficulty when transferring out of the pool, would [facility] in [facility] for safety with bathing. Patient states he was independent in all ADLs however increased difficulty, required increased time to complete task. Patient sleeps in recliner chair.

## 2020-01-27 NOTE — DISCHARGE NOTE PROVIDER - CARE PROVIDER_API CALL
Milton Wheeler)  Orthopedics  [address] [address]  Phone: [phone]  Fax: [phone]  Follow Up Time:

## 2020-01-27 NOTE — DISCHARGE NOTE PROVIDER - NSDCMRMEDTOKEN_GEN_ALL_CORE_FT
acetaminophen 325 mg oral tablet: 2 tab(s) orally every 6 hours, as needed mild pain (1-3) or fever temperature 100.3 or higher.  Do not take in the same 4 hours as oxycodone/acetaminophen.  Do not take more than 3,000mg in a day  bisacodyl 10 mg rectal suppository: 1 suppository(ies) rectal once a day, As needed, Constipation  bisacodyl 5 mg oral delayed release tablet: 1 tab(s) orally every 12 hours, As needed, Constipation  CeleBREX 100 mg oral capsule: 1 cap(s) orally 2 times a day  Gengraf 175m  orally once a day  Imuran 50 mg oral tablet: 1 tab(s) orally once a day  Can restart 2 weeks post-operative on 20  latanoprost 0.005% ophthalmic solution: 1 drop(s) to each affected eye once a day (in the evening)  MiraLax 17 gm: orally once a day  polyethylene glycol 3350 oral powder for reconstitution: 17 gram(s) orally once a day  Proscar 5 mg oral tablet: tab(s) orally once a day  ramipril 3.75m  orally once a day  rosuvastatin 5 mg oral capsule: 1 cap(s) orally Monday, Wednesday, and Friday  senna oral tablet: 2 tab(s) orally once a day (at bedtime)  Sulfatrim Suspension 20 ml: orally Monday, Wednesday, and Friday  VESIcare 10 mg oral tablet: 1 tab(s) orally once a day  Xarelto 20 mg oral tablet: 1 tab(s) orally once a day (in the evening)  Can restart on Friday, 20  Zetia 10 mg oral tablet: 1 tab(s) orally once a day acetaminophen 325 mg oral tablet: 2 tab(s) orally every 6 hours, as needed mild pain (1-3) or fever temperature 100.3 or higher.  Do not take in the same 4 hours as oxycodone/acetaminophen.  Do not take more than 3,000mg in a day  bisacodyl 10 mg rectal suppository: 1 suppository(ies) rectal once a day, As needed, Constipation  bisacodyl 5 mg oral delayed release tablet: 1 tab(s) orally every 12 hours, As needed, Constipation  cyclobenzaprine 5 mg oral tablet: 1 tab(s) orally every 8 hours, as needed, muscle relaxant MDD:3    Imuran 50 mg oral tablet: 1 tab(s) orally once a day  Can restart 2 weeks post-operative on 2/7/20  latanoprost 0.005% ophthalmic solution: 1 drop(s) to each affected eye once a day (in the evening)  oxycodone-acetaminophen 5 mg-325 mg oral tablet: 1 tab(s) orally every 4 hours, as needed, pain MDD:6    polyethylene glycol 3350 oral powder for reconstitution: 17 gram(s) orally once a day  Proscar 5 mg oral tablet: tab(s) orally once a day  rosuvastatin 5 mg oral capsule: 1 cap(s) orally Monday, Wednesday, and Friday  senna oral tablet: 2 tab(s) orally once a day (at bedtime)  VESIcare 10 mg oral tablet: 1 tab(s) orally once a day  Xarelto 20 mg oral tablet: 1 tab(s) orally once a day (in the evening)  Can restart on Friday, 1/31/20  Zetia 10 mg oral tablet: 1 tab(s) orally once a day

## 2020-01-27 NOTE — OCCUPATIONAL THERAPY INITIAL EVALUATION ADULT - STANDING BALANCE: DYNAMIC, REHAB EVAL
fair balance/Patient ambulated approximately 150 ft w/ RW independently, use of SC for ambulation in bathroom independently.

## 2020-01-27 NOTE — PROGRESS NOTE ADULT - SUBJECTIVE AND OBJECTIVE BOX
Orthopedics Progress Note    Procedure: ACDF C5-C7, PARTIAL CORPECTOMY C6  Surgeon: MINO Pizarro. stable, laying in bed, pain well controlled. Denies any SOB/CP/nausea/vomiting/ numbness/tingling in b/l ues.     Vital Signs Last 24 Hrs  T(C): 37.3 (27 Jan 2020 05:54), Max: 37.3 (27 Jan 2020 05:54)  T(F): 99.1 (27 Jan 2020 05:54), Max: 99.1 (27 Jan 2020 05:54)  HR: 65 (27 Jan 2020 05:54) (56 - 88)  BP: 114/71 (27 Jan 2020 05:54) (104/59 - 133/78)  BP(mean): --  RR: 16 (27 Jan 2020 05:54) (16 - 18)  SpO2: 96% (27 Jan 2020 05:54) (94% - 97%)      Dressing C/D/I with 1 drain w/ minimal output    Pulses: Brachial/Radial 2+  SLT: intact M/U/R  Motor: /Finger Int/Wrist flexion/ext/Biceps/triceps/Delts 5/5                                                            11.3   11.74 )-----------( 201      ( 26 Jan 2020 06:22 )             35.9           A/P: 75yoMale  s/p ACDF C5-C7, PARTIAL CORPECTOMY C6 on 1/24  - Stable  - Pain Control  - DVT ppx: asa 81  - Post op abx: ancef  - PT, WBS: wbat b/l ues  - F/U AM Labs  - f/u drain output  - Dispo: home today

## 2020-01-27 NOTE — DISCHARGE NOTE PROVIDER - NSDCFUADDINST_GEN_ALL_CORE_FT
Wear cervical collar except for dressing and showering  No strenuous activity (bending/twisting), heavy lifting, driving or returning to work until cleared by MD.  You have a prineo incisional dressing.  Keep dressing on and do not remove until follow up appointment  You may shower.   Try to have regular bowel movements, take stool softener or laxative if necessary.  May take pepcid or zantac for upset stomach.  Ice affected areas to decrease swelling.  Call to schedule an appt with Dr. Wheeler for follow up, if you have staples or sutures they will be removed in office.  Contact your doctor if you experience: fever greater than 101.5, chills, chest pain, difficulty breathing, redness or excessive drainage around the incision, other concerns. Wear [place] J cervical collar except for dressing and showering  Wear sandie collar for showering  No strenuous activity (bending/twisting), heavy lifting, driving or returning to work until cleared by MD.  You have a prineo incisional dressing.  Keep dressing on and do not remove until follow up appointment  You may shower.   Try to have regular bowel movements, take stool softener or laxative if necessary.  May take pepcid or zantac for upset stomach.  Ice affected areas to decrease swelling.  Call to schedule an appt with Dr. Wheeler for follow up, if you have staples or sutures they will be removed in office.  Contact your doctor if you experience: fever greater than 101.5, chills, chest pain, difficulty breathing, redness or excessive drainage around the incision, other concerns.

## 2020-02-03 PROCEDURE — C1889: CPT

## 2020-02-03 PROCEDURE — 86850 RBC ANTIBODY SCREEN: CPT

## 2020-02-03 PROCEDURE — 86901 BLOOD TYPING SEROLOGIC RH(D): CPT

## 2020-02-03 PROCEDURE — 95940 IONM IN OPERATNG ROOM 15 MIN: CPT

## 2020-02-03 PROCEDURE — 85025 COMPLETE CBC W/AUTO DIFF WBC: CPT

## 2020-02-03 PROCEDURE — 72141 MRI NECK SPINE W/O DYE: CPT

## 2020-02-03 PROCEDURE — 72125 CT NECK SPINE W/O DYE: CPT

## 2020-02-03 PROCEDURE — 36415 COLL VENOUS BLD VENIPUNCTURE: CPT

## 2020-02-03 PROCEDURE — 80048 BASIC METABOLIC PNL TOTAL CA: CPT

## 2020-02-03 PROCEDURE — C1713: CPT

## 2020-02-03 PROCEDURE — 85610 PROTHROMBIN TIME: CPT

## 2020-02-03 PROCEDURE — 85730 THROMBOPLASTIN TIME PARTIAL: CPT

## 2020-02-03 PROCEDURE — 93970 EXTREMITY STUDY: CPT

## 2020-02-03 PROCEDURE — 97161 PT EVAL LOW COMPLEX 20 MIN: CPT

## 2020-02-03 PROCEDURE — 76000 FLUOROSCOPY <1 HR PHYS/QHP: CPT

## 2020-02-04 DIAGNOSIS — Z96.652 PRESENCE OF LEFT ARTIFICIAL KNEE JOINT: ICD-10-CM

## 2020-02-04 DIAGNOSIS — N40.0 BENIGN PROSTATIC HYPERPLASIA WITHOUT LOWER URINARY TRACT SYMPTOMS: ICD-10-CM

## 2020-02-04 DIAGNOSIS — M48.02 SPINAL STENOSIS, CERVICAL REGION: ICD-10-CM

## 2020-02-04 DIAGNOSIS — D35.2 BENIGN NEOPLASM OF PITUITARY GLAND: ICD-10-CM

## 2020-02-04 DIAGNOSIS — I65.21 OCCLUSION AND STENOSIS OF RIGHT CAROTID ARTERY: ICD-10-CM

## 2020-02-04 DIAGNOSIS — Z88.1 ALLERGY STATUS TO OTHER ANTIBIOTIC AGENTS STATUS: ICD-10-CM

## 2020-02-04 DIAGNOSIS — G95.89 OTHER SPECIFIED DISEASES OF SPINAL CORD: ICD-10-CM

## 2020-02-04 DIAGNOSIS — M40.202 UNSPECIFIED KYPHOSIS, CERVICAL REGION: ICD-10-CM

## 2020-02-04 DIAGNOSIS — I25.10 ATHEROSCLEROTIC HEART DISEASE OF NATIVE CORONARY ARTERY WITHOUT ANGINA PECTORIS: ICD-10-CM

## 2020-02-04 DIAGNOSIS — M46.02 SPINAL ENTHESOPATHY, CERVICAL REGION: ICD-10-CM

## 2020-02-04 DIAGNOSIS — M50.122 CERVICAL DISC DISORDER AT C5-C6 LEVEL WITH RADICULOPATHY: ICD-10-CM

## 2020-02-04 DIAGNOSIS — G47.33 OBSTRUCTIVE SLEEP APNEA (ADULT) (PEDIATRIC): ICD-10-CM

## 2020-02-04 DIAGNOSIS — L30.9 DERMATITIS, UNSPECIFIED: ICD-10-CM

## 2020-02-04 DIAGNOSIS — Z95.5 PRESENCE OF CORONARY ANGIOPLASTY IMPLANT AND GRAFT: ICD-10-CM

## 2020-02-04 DIAGNOSIS — G62.9 POLYNEUROPATHY, UNSPECIFIED: ICD-10-CM

## 2020-02-04 DIAGNOSIS — Z95.828 PRESENCE OF OTHER VASCULAR IMPLANTS AND GRAFTS: ICD-10-CM

## 2020-02-04 DIAGNOSIS — Z86.718 PERSONAL HISTORY OF OTHER VENOUS THROMBOSIS AND EMBOLISM: ICD-10-CM

## 2020-02-04 DIAGNOSIS — M50.022 CERVICAL DISC DISORDER AT C5-C6 LEVEL WITH MYELOPATHY: ICD-10-CM

## 2020-02-04 DIAGNOSIS — I45.10 UNSPECIFIED RIGHT BUNDLE-BRANCH BLOCK: ICD-10-CM

## 2020-02-04 DIAGNOSIS — I72.3 ANEURYSM OF ILIAC ARTERY: ICD-10-CM

## 2020-02-04 DIAGNOSIS — M25.78 OSTEOPHYTE, VERTEBRAE: ICD-10-CM

## 2020-02-04 DIAGNOSIS — I10 ESSENTIAL (PRIMARY) HYPERTENSION: ICD-10-CM

## 2020-02-04 DIAGNOSIS — Z79.01 LONG TERM (CURRENT) USE OF ANTICOAGULANTS: ICD-10-CM

## 2020-02-17 ENCOUNTER — FORM ENCOUNTER (OUTPATIENT)
Age: 75
End: 2020-02-17

## 2020-02-18 ENCOUNTER — OUTPATIENT (OUTPATIENT)
Dept: OUTPATIENT SERVICES | Facility: HOSPITAL | Age: 75
LOS: 1 days | End: 2020-02-18
Payer: COMMERCIAL

## 2020-02-18 ENCOUNTER — APPOINTMENT (OUTPATIENT)
Dept: ORTHOPEDIC SURGERY | Facility: CLINIC | Age: 75
End: 2020-02-18
Payer: COMMERCIAL

## 2020-02-18 DIAGNOSIS — Z98.890 OTHER SPECIFIED POSTPROCEDURAL STATES: Chronic | ICD-10-CM

## 2020-02-18 DIAGNOSIS — Z41.9 ENCOUNTER FOR PROCEDURE FOR PURPOSES OTHER THAN REMEDYING HEALTH STATE, UNSPECIFIED: Chronic | ICD-10-CM

## 2020-02-18 DIAGNOSIS — Z98.52 VASECTOMY STATUS: Chronic | ICD-10-CM

## 2020-02-18 DIAGNOSIS — Z98.89 OTHER SPECIFIED POSTPROCEDURAL STATES: Chronic | ICD-10-CM

## 2020-02-18 DIAGNOSIS — N63.0 UNSPECIFIED LUMP IN UNSPECIFIED BREAST: Chronic | ICD-10-CM

## 2020-02-18 DIAGNOSIS — Z95.5 PRESENCE OF CORONARY ANGIOPLASTY IMPLANT AND GRAFT: Chronic | ICD-10-CM

## 2020-02-18 DIAGNOSIS — Z96.652 PRESENCE OF LEFT ARTIFICIAL KNEE JOINT: Chronic | ICD-10-CM

## 2020-02-18 PROBLEM — K46.9 UNSPECIFIED ABDOMINAL HERNIA WITHOUT OBSTRUCTION OR GANGRENE: Chronic | Status: ACTIVE | Noted: 2020-01-23

## 2020-02-18 PROBLEM — I25.10 ATHEROSCLEROTIC HEART DISEASE OF NATIVE CORONARY ARTERY WITHOUT ANGINA PECTORIS: Chronic | Status: ACTIVE | Noted: 2020-01-24

## 2020-02-18 PROBLEM — L30.9 DERMATITIS, UNSPECIFIED: Chronic | Status: ACTIVE | Noted: 2020-01-24

## 2020-02-18 PROBLEM — D35.2 BENIGN NEOPLASM OF PITUITARY GLAND: Chronic | Status: ACTIVE | Noted: 2020-01-23

## 2020-02-18 PROBLEM — I82.409 ACUTE EMBOLISM AND THROMBOSIS OF UNSPECIFIED DEEP VEINS OF UNSPECIFIED LOWER EXTREMITY: Chronic | Status: ACTIVE | Noted: 2020-01-23

## 2020-02-18 PROCEDURE — 99024 POSTOP FOLLOW-UP VISIT: CPT

## 2020-02-18 PROCEDURE — 72040 X-RAY EXAM NECK SPINE 2-3 VW: CPT | Mod: 26

## 2020-02-18 PROCEDURE — 72040 X-RAY EXAM NECK SPINE 2-3 VW: CPT

## 2020-02-24 NOTE — HISTORY OF PRESENT ILLNESS
[de-identified] : 3 weeks post op  [de-identified] : s/p C4/C5 and C5/C6 ACDF, doing well. Reports mild neck pain, improving. No upper extremity radicular pain. Balance improved from preop. Denies dysphagia. Denies fever/chills/drainage from incision. Compliant with collar. Ambulating with a walker. Back on imuran and xarelto. [de-identified] : Well healing incision, no erythema, drainage, or warmth\par 5/5 strength L2-S1 b/l\par SILT L2-S1 b/l\par wwp\par neg homans b/l [de-identified] : s/p C4/C5 and C5/C6 ACDF, doing well. Improved balance from preop. Neck pain improving. No sign of infection. Off pain meds. [de-identified] : XR 2/18/20: hardware intact, good position, unchanged from intraop imaging  [de-identified] : Wound and activity instructions given\par Follow up with me in 3 weeks, sooner if there is an issue\par XR AP/lateral cervical at that time\par All questions answered

## 2020-04-02 ENCOUNTER — APPOINTMENT (OUTPATIENT)
Dept: ORTHOPEDIC SURGERY | Facility: CLINIC | Age: 75
End: 2020-04-02
Payer: COMMERCIAL

## 2020-04-02 PROCEDURE — 99024 POSTOP FOLLOW-UP VISIT: CPT

## 2020-04-02 NOTE — HISTORY OF PRESENT ILLNESS
[Home] : at home, [unfilled] , at the time of the visit. [Other Location: e.g. Home (Enter Location, City,State)___] : at [unfilled] [de-identified] : Telephone used\par \par call 10-20 minutes\par \par patient had 3-4 days of neck pain a few weeks ago that has now completely resolved.  has been unable to get a XRay cervical for past 3 weeks due to COVID-19 crisis.  Denies radicular pain.  reports balance improved since preop.  incision he reports healing well and now "invisible."  has been compliant with collar.\par \par we discussed wound care and acitivity instructions\par continue collar until 3 months post op (after new xray reviewed)\par needs new xray cervical AP/Lateral, hopefully can get in the next few weeks\par follow up in office or telemedicine with AW anita in 4 weeks\par all questions answered

## 2020-04-21 ENCOUNTER — APPOINTMENT (OUTPATIENT)
Dept: ORTHOPEDIC SURGERY | Facility: CLINIC | Age: 75
End: 2020-04-21

## 2020-05-19 ENCOUNTER — APPOINTMENT (OUTPATIENT)
Dept: ORTHOPEDIC SURGERY | Facility: CLINIC | Age: 75
End: 2020-05-19
Payer: COMMERCIAL

## 2020-05-19 PROCEDURE — XXXXX: CPT

## 2020-08-29 NOTE — OCCUPATIONAL THERAPY INITIAL EVALUATION ADULT - SHORT TERM MEMORY, REHAB EVAL
Attempted to reach patient for a ED follow up in regards to COVID-19 education/ monitoring. Patient was unavailable at the time of my call, and a generic voicemail message was left asking patient to return my call at [phone]. I have not heard back from the patient, I will close the encounter and end the episode.        [facility]  [address] [facility]   Medication Assistance  [facility] [address]    (N)[phone] (u)[phone]
intact

## 2021-04-27 DIAGNOSIS — R26.89 OTHER ABNORMALITIES OF GAIT AND MOBILITY: ICD-10-CM

## 2021-04-29 ENCOUNTER — APPOINTMENT (OUTPATIENT)
Dept: MRI IMAGING | Facility: HOSPITAL | Age: 76
End: 2021-04-29

## 2021-04-29 ENCOUNTER — OUTPATIENT (OUTPATIENT)
Dept: OUTPATIENT SERVICES | Facility: HOSPITAL | Age: 76
LOS: 1 days | End: 2021-04-29
Payer: COMMERCIAL

## 2021-04-29 DIAGNOSIS — Z41.9 ENCOUNTER FOR PROCEDURE FOR PURPOSES OTHER THAN REMEDYING HEALTH STATE, UNSPECIFIED: Chronic | ICD-10-CM

## 2021-04-29 DIAGNOSIS — N63.0 UNSPECIFIED LUMP IN UNSPECIFIED BREAST: Chronic | ICD-10-CM

## 2021-04-29 DIAGNOSIS — Z98.890 OTHER SPECIFIED POSTPROCEDURAL STATES: Chronic | ICD-10-CM

## 2021-04-29 DIAGNOSIS — Z98.1 ARTHRODESIS STATUS: ICD-10-CM

## 2021-04-29 DIAGNOSIS — Z96.652 PRESENCE OF LEFT ARTIFICIAL KNEE JOINT: Chronic | ICD-10-CM

## 2021-04-29 DIAGNOSIS — Z98.52 VASECTOMY STATUS: Chronic | ICD-10-CM

## 2021-04-29 DIAGNOSIS — R20.0 ANESTHESIA OF SKIN: ICD-10-CM

## 2021-04-29 DIAGNOSIS — R26.89 OTHER ABNORMALITIES OF GAIT AND MOBILITY: ICD-10-CM

## 2021-04-29 DIAGNOSIS — Z98.89 OTHER SPECIFIED POSTPROCEDURAL STATES: Chronic | ICD-10-CM

## 2021-04-29 DIAGNOSIS — Z95.5 PRESENCE OF CORONARY ANGIOPLASTY IMPLANT AND GRAFT: Chronic | ICD-10-CM

## 2021-04-29 PROCEDURE — 72141 MRI NECK SPINE W/O DYE: CPT

## 2021-04-29 PROCEDURE — 72148 MRI LUMBAR SPINE W/O DYE: CPT | Mod: 26

## 2021-04-29 PROCEDURE — 72141 MRI NECK SPINE W/O DYE: CPT | Mod: 26

## 2021-04-29 PROCEDURE — 72148 MRI LUMBAR SPINE W/O DYE: CPT

## 2021-05-07 ENCOUNTER — APPOINTMENT (OUTPATIENT)
Dept: CT IMAGING | Facility: HOSPITAL | Age: 76
End: 2021-05-07
Payer: COMMERCIAL

## 2021-05-07 ENCOUNTER — OUTPATIENT (OUTPATIENT)
Dept: OUTPATIENT SERVICES | Facility: HOSPITAL | Age: 76
LOS: 1 days | End: 2021-05-07
Payer: COMMERCIAL

## 2021-05-07 DIAGNOSIS — Z98.890 OTHER SPECIFIED POSTPROCEDURAL STATES: Chronic | ICD-10-CM

## 2021-05-07 DIAGNOSIS — Z96.652 PRESENCE OF LEFT ARTIFICIAL KNEE JOINT: Chronic | ICD-10-CM

## 2021-05-07 DIAGNOSIS — Z98.52 VASECTOMY STATUS: Chronic | ICD-10-CM

## 2021-05-07 DIAGNOSIS — Z95.5 PRESENCE OF CORONARY ANGIOPLASTY IMPLANT AND GRAFT: Chronic | ICD-10-CM

## 2021-05-07 DIAGNOSIS — Z41.9 ENCOUNTER FOR PROCEDURE FOR PURPOSES OTHER THAN REMEDYING HEALTH STATE, UNSPECIFIED: Chronic | ICD-10-CM

## 2021-05-07 DIAGNOSIS — N63.0 UNSPECIFIED LUMP IN UNSPECIFIED BREAST: Chronic | ICD-10-CM

## 2021-05-07 DIAGNOSIS — Z98.89 OTHER SPECIFIED POSTPROCEDURAL STATES: Chronic | ICD-10-CM

## 2021-05-07 DIAGNOSIS — R26.89 OTHER ABNORMALITIES OF GAIT AND MOBILITY: ICD-10-CM

## 2021-05-07 PROCEDURE — 72125 CT NECK SPINE W/O DYE: CPT | Mod: 26

## 2021-05-07 PROCEDURE — 72125 CT NECK SPINE W/O DYE: CPT

## 2021-05-11 ENCOUNTER — OUTPATIENT (OUTPATIENT)
Dept: OUTPATIENT SERVICES | Facility: HOSPITAL | Age: 76
LOS: 1 days | End: 2021-05-11
Payer: COMMERCIAL

## 2021-05-11 ENCOUNTER — APPOINTMENT (OUTPATIENT)
Dept: MRI IMAGING | Facility: HOSPITAL | Age: 76
End: 2021-05-11

## 2021-05-11 ENCOUNTER — APPOINTMENT (OUTPATIENT)
Dept: ORTHOPEDIC SURGERY | Facility: CLINIC | Age: 76
End: 2021-05-11
Payer: COMMERCIAL

## 2021-05-11 VITALS — TEMPERATURE: 97 F

## 2021-05-11 DIAGNOSIS — M48.061 SPINAL STENOSIS, LUMBAR REGION WITHOUT NEUROGENIC CLAUDICATION: ICD-10-CM

## 2021-05-11 DIAGNOSIS — Z98.1 ARTHRODESIS STATUS: ICD-10-CM

## 2021-05-11 DIAGNOSIS — Z98.52 VASECTOMY STATUS: Chronic | ICD-10-CM

## 2021-05-11 DIAGNOSIS — Z95.5 PRESENCE OF CORONARY ANGIOPLASTY IMPLANT AND GRAFT: Chronic | ICD-10-CM

## 2021-05-11 DIAGNOSIS — R26.9 UNSPECIFIED ABNORMALITIES OF GAIT AND MOBILITY: ICD-10-CM

## 2021-05-11 DIAGNOSIS — Z96.652 PRESENCE OF LEFT ARTIFICIAL KNEE JOINT: Chronic | ICD-10-CM

## 2021-05-11 DIAGNOSIS — Z41.9 ENCOUNTER FOR PROCEDURE FOR PURPOSES OTHER THAN REMEDYING HEALTH STATE, UNSPECIFIED: Chronic | ICD-10-CM

## 2021-05-11 DIAGNOSIS — R26.81 UNSTEADINESS ON FEET: ICD-10-CM

## 2021-05-11 DIAGNOSIS — Z98.890 OTHER SPECIFIED POSTPROCEDURAL STATES: Chronic | ICD-10-CM

## 2021-05-11 DIAGNOSIS — R20.0 ANESTHESIA OF SKIN: ICD-10-CM

## 2021-05-11 DIAGNOSIS — Z98.89 OTHER SPECIFIED POSTPROCEDURAL STATES: Chronic | ICD-10-CM

## 2021-05-11 DIAGNOSIS — N63.0 UNSPECIFIED LUMP IN UNSPECIFIED BREAST: Chronic | ICD-10-CM

## 2021-05-11 PROCEDURE — 99214 OFFICE O/P EST MOD 30 MIN: CPT

## 2021-05-11 PROCEDURE — 72146 MRI CHEST SPINE W/O DYE: CPT | Mod: 26

## 2021-05-11 PROCEDURE — 72146 MRI CHEST SPINE W/O DYE: CPT

## 2021-05-19 ENCOUNTER — APPOINTMENT (OUTPATIENT)
Dept: NEUROLOGY | Facility: CLINIC | Age: 76
End: 2021-05-19
Payer: COMMERCIAL

## 2021-05-19 VITALS
DIASTOLIC BLOOD PRESSURE: 71 MMHG | WEIGHT: 195 LBS | BODY MASS INDEX: 27.3 KG/M2 | HEIGHT: 71 IN | SYSTOLIC BLOOD PRESSURE: 127 MMHG | HEART RATE: 69 BPM | TEMPERATURE: 98.2 F | OXYGEN SATURATION: 97 %

## 2021-05-19 PROCEDURE — 99417 PROLNG OP E/M EACH 15 MIN: CPT

## 2021-05-19 PROCEDURE — 99215 OFFICE O/P EST HI 40 MIN: CPT

## 2021-05-21 LAB
ALBUMIN MFR SERPL ELPH: 62.2 %
ALBUMIN SERPL-MCNC: 3.9 G/DL
ALBUMIN/GLOB SERPL: 1.7 RATIO
ALPHA1 GLOB MFR SERPL ELPH: 4.4 %
ALPHA1 GLOB SERPL ELPH-MCNC: 0.3 G/DL
ALPHA2 GLOB MFR SERPL ELPH: 8.7 %
ALPHA2 GLOB SERPL ELPH-MCNC: 0.5 G/DL
B-GLOBULIN MFR SERPL ELPH: 11.2 %
B-GLOBULIN SERPL ELPH-MCNC: 0.7 G/DL
CK SERPL-CCNC: 127 U/L
FOLATE SERPL-MCNC: >20 NG/ML
GAMMA GLOB FLD ELPH-MCNC: 0.8 G/DL
GAMMA GLOB MFR SERPL ELPH: 13.5 %
INTERPRETATION SERPL IEP-IMP: NORMAL
PROT SERPL-MCNC: 6.2 G/DL
PROT SERPL-MCNC: 6.2 G/DL
TSH SERPL-ACNC: 1.72 UIU/ML
VIT B12 SERPL-MCNC: 716 PG/ML

## 2021-05-21 NOTE — CONSULT LETTER
[Dear  ___] : Dear  [unfilled], [Consult Letter:] : I had the pleasure of evaluating your patient, [unfilled]. [Please see my note below.] : Please see my note below. [Consult Closing:] : Thank you very much for allowing me to participate in the care of this patient.  If you have any questions, please do not hesitate to contact me. [Sincerely,] : Sincerely, [FreeTextEntry3] : Kym Carty MD MSc\par Neuroimmunology and Multiple Sclerosis\par [facility]\par [facility]

## 2021-05-24 LAB — CYCLOSPORINE SER-MCNC: 34 NG/ML

## 2021-05-26 LAB — VIT B1 SERPL-MCNC: 129.2 NMOL/L

## 2021-06-11 ENCOUNTER — APPOINTMENT (OUTPATIENT)
Dept: NEUROLOGY | Facility: CLINIC | Age: 76
End: 2021-06-11
Payer: COMMERCIAL

## 2021-06-11 VITALS
OXYGEN SATURATION: 96 % | BODY MASS INDEX: 27.3 KG/M2 | SYSTOLIC BLOOD PRESSURE: 119 MMHG | TEMPERATURE: 98.2 F | HEIGHT: 71 IN | WEIGHT: 195 LBS | DIASTOLIC BLOOD PRESSURE: 66 MMHG | RESPIRATION RATE: 16 BRPM | HEART RATE: 65 BPM

## 2021-06-11 PROCEDURE — 99214 OFFICE O/P EST MOD 30 MIN: CPT

## 2021-06-11 NOTE — HISTORY OF PRESENT ILLNESS
[FreeTextEntry1] : Initial hx 5/2021\par Has been using a cane since knee replacement for long distance. \par Since spring 2020, noted dysequilibrium, mainly in the pool early on, then more dysequilibrium at all times, progressive over months, also associated with coldness/tingling in both feet that worsened over a similar timeframe. \par Saw Dr. Olvera, saw Dr. GOETZ. Saw Dr. Cruz and Dr. Wheeler for cervical myelopathy, got decompression in 1/2020. \par Coldness/tingling in the feet improved. Dysequilibrium also improved but was not doing very much, just walking around the house during covid.\par Now that he has started to go back to gym, dysequilibrium has recurred along with foot numbness, not on exertion, albeit to a lesser extent than prior to cervical surgery. Saw Dr. Wheeler, ordered for repeat MRI C spine and L spine which were stable, but notably MRI L spine showed severe central canal stenosis. \par Has BPH that causes urinary dysfunction, meds help. \par \par Subj interval:\par \par Here for discussion of EMG results.\par \par Reports episodes of "vibratory" sensation in both feet coming up the ankle.\par \par PMHX:\par CAD\par BL CTS s/p R surgery in remote past\par cervical stenosis s/p decompression\par lumbar stenosis\par BPH, s/p prostate ablation.\par ED\par severe eczema\par \par MEDS:\par methenamine\par xarelto\par celebrex\par zetia\par proscar\par vesicare\par cyclosporine 100\par latanoprost\par D3\par vitC\par folate\par miralax\par citrocel\par tums\par cosamin\par ramipril\par methenamine\par rosuvastatin\par sulfatrim\par \par \par O:\par \par AO3. Normally conversant. Follows commands, names, and repeats. Good attention.\par perrl\par moves all 4 well\par \par EMG 2021 - read as severe sensorimotor axonal neuropathy\par \par AP: 75yo w/ foot numbness and imbalance that improved after cervical cord decompression in 1/2020 but has since recurred to a lesser degree. Exam notable for mildly decreased VBS in both feet, absent AJs, and +romberg.  EMG read as consistent with severe axonal neuropathy. Neuropathy labs were unrevealing.\par \par all questions answered, education provided, management discussed. time spent on phone to obtain EMG report.\par \par - f/u with derm for eczema. pt to discuss cyclosporine w/ dermatology given neuropathy, if EMG demonstrates this.\par - f/u with spine specialist for structural spine dz

## 2021-06-25 PROBLEM — Z98.1 S/P CERVICAL SPINAL FUSION: Status: ACTIVE | Noted: 2020-01-24

## 2021-06-25 PROBLEM — R26.9 GAIT DISTURBANCE: Status: ACTIVE | Noted: 2019-10-15

## 2021-06-25 PROBLEM — M48.061 LUMBAR STENOSIS: Status: ACTIVE | Noted: 2017-10-09

## 2021-06-25 PROBLEM — R20.0 NUMBNESS IN FEET: Status: ACTIVE | Noted: 2021-04-27

## 2021-06-25 NOTE — HISTORY OF PRESENT ILLNESS
[de-identified] : Follow up 5/11/21: Patient now 16 months s/p C6 corpectomy, C5-C7 anterior fusion. Reports worsening disequilibrium and cold sensation on the bottom of both feet over the past several months. Denies neck pain, denies upper extremity radicular pain.\par

## 2021-06-25 NOTE — PHYSICAL EXAM
[de-identified] : Well healed incision\par 5/5 strength C4-T1; L2-S1 b/l\par SILT C4-T1, L2-S1 b/l\par wwp\par  [de-identified] : MRI/CT cervical 4/2021: well decompressed C5-C7, mild persistent cord signal change at C5/C6, unchanged; appears well fused, no fractures\par \par MRI lumbar 4/2021: multilevel disc bulges and posterior element hyperrrophy resulting in moderate/severe central canal stenosis at L1/L2 through L4/L5

## 2021-06-25 NOTE — DISCUSSION/SUMMARY
[de-identified] : Discussed my findings with the patient. I am recommending an MRI thoracic without contrast. Will follow up with neurology, Dr. Carty, after imaging for evaluation of disequilibrium and cold sensation in his feet. Follow up with me after imaging and evaluation are completed, sooner if there is an issue. All questions answered.

## 2021-09-09 NOTE — PACU DISCHARGE NOTE - NS MD DISCHARGE NOTE DISCHARGE
Approximately 10 pound weight gain over the last year, no change to diet or exercise regimen   Chronic issue managed with simvastatin, tolerating medication without side effect including myalgia.  Last LDL remained above goal at 126.  Following a healthy diet, exercising regularly   Home Long-standing problem with intermittent flares.  Pain can be either right or left, muscles feel tight and feels that he is having spasms at times.  No specific history of injury.  No radiation to lower extremities.  No numbness, tingling, weakness in the legs.  Just started physical therapy last week.  No imaging   On levothyroxine 88 mcg 5 days/ wk and 100 mcg on weekends  Good energy level.  No constipation, hair loss, heat or cold intolerance.  He has, however, had some progressive weight gain over the last year   On lisinopril 10 mg daily  Home readings primarily in the 120s over 80s, highest reading recently was 130 systolic  No chest pain, dizziness, chronic cough   show

## 2021-10-16 ENCOUNTER — NON-APPOINTMENT (OUTPATIENT)
Age: 76
End: 2021-10-16

## 2021-10-16 ENCOUNTER — APPOINTMENT (OUTPATIENT)
Dept: ORTHOPEDIC SURGERY | Facility: CLINIC | Age: 76
End: 2021-10-16
Payer: MEDICARE

## 2021-10-16 VITALS
SYSTOLIC BLOOD PRESSURE: 158 MMHG | BODY MASS INDEX: 27.49 KG/M2 | WEIGHT: 192 LBS | HEART RATE: 60 BPM | DIASTOLIC BLOOD PRESSURE: 91 MMHG | HEIGHT: 70 IN

## 2021-10-16 PROCEDURE — 20610 DRAIN/INJ JOINT/BURSA W/O US: CPT | Mod: RT

## 2021-10-16 PROCEDURE — 73562 X-RAY EXAM OF KNEE 3: CPT | Mod: RT

## 2021-10-16 PROCEDURE — 99213 OFFICE O/P EST LOW 20 MIN: CPT | Mod: 25

## 2021-10-16 RX ADMIN — LIDOCAINE HYDROCHLORIDE 5 %: 10 INJECTION, SOLUTION INFILTRATION; PERINEURAL at 00:00

## 2021-10-16 RX ADMIN — Medication 0 MG/3ML: at 00:00

## 2021-11-04 RX ORDER — LIDOCAINE HYDROCHLORIDE 10 MG/ML
1 INJECTION, SOLUTION INFILTRATION; PERINEURAL
Refills: 0 | Status: COMPLETED | OUTPATIENT
Start: 2021-10-16

## 2021-11-04 RX ORDER — HYALURONATE SOD, CROSS-LINKED 30 MG/3 ML
30 SYRINGE (ML) INTRAARTICULAR
Refills: 0 | Status: COMPLETED | OUTPATIENT
Start: 2021-10-16

## 2022-01-18 ENCOUNTER — INPATIENT (INPATIENT)
Facility: HOSPITAL | Age: 77
LOS: 2 days | Discharge: HOME | DRG: 66 | End: 2022-01-21
Attending: INTERNAL MEDICINE | Admitting: INTERNAL MEDICINE
Payer: MEDICARE

## 2022-01-18 VITALS
RESPIRATION RATE: 18 BRPM | HEART RATE: 59 BPM | SYSTOLIC BLOOD PRESSURE: 142 MMHG | TEMPERATURE: 98 F | DIASTOLIC BLOOD PRESSURE: 77 MMHG | OXYGEN SATURATION: 97 % | WEIGHT: 199.96 LBS | HEIGHT: 71 IN

## 2022-01-18 DIAGNOSIS — Z96.652 PRESENCE OF LEFT ARTIFICIAL KNEE JOINT: Chronic | ICD-10-CM

## 2022-01-18 DIAGNOSIS — L30.9 DERMATITIS, UNSPECIFIED: ICD-10-CM

## 2022-01-18 DIAGNOSIS — Z98.890 OTHER SPECIFIED POSTPROCEDURAL STATES: Chronic | ICD-10-CM

## 2022-01-18 DIAGNOSIS — R20.0 ANESTHESIA OF SKIN: ICD-10-CM

## 2022-01-18 DIAGNOSIS — Z98.89 OTHER SPECIFIED POSTPROCEDURAL STATES: Chronic | ICD-10-CM

## 2022-01-18 DIAGNOSIS — Z29.9 ENCOUNTER FOR PROPHYLACTIC MEASURES, UNSPECIFIED: ICD-10-CM

## 2022-01-18 DIAGNOSIS — Z41.9 ENCOUNTER FOR PROCEDURE FOR PURPOSES OTHER THAN REMEDYING HEALTH STATE, UNSPECIFIED: Chronic | ICD-10-CM

## 2022-01-18 DIAGNOSIS — I25.10 ATHEROSCLEROTIC HEART DISEASE OF NATIVE CORONARY ARTERY WITHOUT ANGINA PECTORIS: ICD-10-CM

## 2022-01-18 DIAGNOSIS — I10 ESSENTIAL (PRIMARY) HYPERTENSION: ICD-10-CM

## 2022-01-18 DIAGNOSIS — I82.409 ACUTE EMBOLISM AND THROMBOSIS OF UNSPECIFIED DEEP VEINS OF UNSPECIFIED LOWER EXTREMITY: ICD-10-CM

## 2022-01-18 DIAGNOSIS — I63.9 CEREBRAL INFARCTION, UNSPECIFIED: ICD-10-CM

## 2022-01-18 DIAGNOSIS — N40.0 BENIGN PROSTATIC HYPERPLASIA WITHOUT LOWER URINARY TRACT SYMPTOMS: ICD-10-CM

## 2022-01-18 DIAGNOSIS — Z98.52 VASECTOMY STATUS: Chronic | ICD-10-CM

## 2022-01-18 DIAGNOSIS — Z95.5 PRESENCE OF CORONARY ANGIOPLASTY IMPLANT AND GRAFT: Chronic | ICD-10-CM

## 2022-01-18 DIAGNOSIS — N63.0 UNSPECIFIED LUMP IN UNSPECIFIED BREAST: Chronic | ICD-10-CM

## 2022-01-18 LAB
ALBUMIN SERPL ELPH-MCNC: 3.5 G/DL — SIGNIFICANT CHANGE UP (ref 3.5–5)
ALP SERPL-CCNC: 65 U/L — SIGNIFICANT CHANGE UP (ref 40–120)
ALT FLD-CCNC: 21 U/L DA — SIGNIFICANT CHANGE UP (ref 10–60)
ANION GAP SERPL CALC-SCNC: 5 MMOL/L — SIGNIFICANT CHANGE UP (ref 5–17)
APTT BLD: 52.7 SEC — HIGH (ref 27.5–35.5)
AST SERPL-CCNC: 16 U/L — SIGNIFICANT CHANGE UP (ref 10–40)
BASOPHILS # BLD AUTO: 0 K/UL — SIGNIFICANT CHANGE UP (ref 0–0.2)
BASOPHILS NFR BLD AUTO: 0 % — SIGNIFICANT CHANGE UP (ref 0–2)
BILIRUB SERPL-MCNC: 0.5 MG/DL — SIGNIFICANT CHANGE UP (ref 0.2–1.2)
BUN SERPL-MCNC: 34 MG/DL — HIGH (ref 7–18)
CALCIUM SERPL-MCNC: 9.2 MG/DL — SIGNIFICANT CHANGE UP (ref 8.4–10.5)
CHLORIDE SERPL-SCNC: 107 MMOL/L — SIGNIFICANT CHANGE UP (ref 96–108)
CO2 SERPL-SCNC: 28 MMOL/L — SIGNIFICANT CHANGE UP (ref 22–31)
CREAT SERPL-MCNC: 1.06 MG/DL — SIGNIFICANT CHANGE UP (ref 0.5–1.3)
EOSINOPHIL # BLD AUTO: 0.38 K/UL — SIGNIFICANT CHANGE UP (ref 0–0.5)
EOSINOPHIL NFR BLD AUTO: 8 % — HIGH (ref 0–6)
GLUCOSE SERPL-MCNC: 100 MG/DL — HIGH (ref 70–99)
HCT VFR BLD CALC: 41.8 % — SIGNIFICANT CHANGE UP (ref 39–50)
HGB BLD-MCNC: 13.6 G/DL — SIGNIFICANT CHANGE UP (ref 13–17)
INR BLD: 1.34 RATIO — HIGH (ref 0.88–1.16)
LYMPHOCYTES # BLD AUTO: 0.34 K/UL — LOW (ref 1–3.3)
LYMPHOCYTES # BLD AUTO: 7 % — LOW (ref 13–44)
MCHC RBC-ENTMCNC: 31.9 PG — SIGNIFICANT CHANGE UP (ref 27–34)
MCHC RBC-ENTMCNC: 32.5 GM/DL — SIGNIFICANT CHANGE UP (ref 32–36)
MCV RBC AUTO: 98.1 FL — SIGNIFICANT CHANGE UP (ref 80–100)
MONOCYTES # BLD AUTO: 0.29 K/UL — SIGNIFICANT CHANGE UP (ref 0–0.9)
MONOCYTES NFR BLD AUTO: 6 % — SIGNIFICANT CHANGE UP (ref 2–14)
NEUTROPHILS # BLD AUTO: 3.78 K/UL — SIGNIFICANT CHANGE UP (ref 1.8–7.4)
NEUTROPHILS NFR BLD AUTO: 76 % — SIGNIFICANT CHANGE UP (ref 43–77)
PLATELET # BLD AUTO: 206 K/UL — SIGNIFICANT CHANGE UP (ref 150–400)
POTASSIUM SERPL-MCNC: 4.4 MMOL/L — SIGNIFICANT CHANGE UP (ref 3.5–5.3)
POTASSIUM SERPL-SCNC: 4.4 MMOL/L — SIGNIFICANT CHANGE UP (ref 3.5–5.3)
PROT SERPL-MCNC: 6.9 G/DL — SIGNIFICANT CHANGE UP (ref 6–8.3)
PROTHROM AB SERPL-ACNC: 15.7 SEC — HIGH (ref 10.6–13.6)
RBC # BLD: 4.26 M/UL — SIGNIFICANT CHANGE UP (ref 4.2–5.8)
RBC # FLD: 12.6 % — SIGNIFICANT CHANGE UP (ref 10.3–14.5)
SARS-COV-2 RNA SPEC QL NAA+PROBE: SIGNIFICANT CHANGE UP
SODIUM SERPL-SCNC: 140 MMOL/L — SIGNIFICANT CHANGE UP (ref 135–145)
TROPONIN I, HIGH SENSITIVITY RESULT: 7.2 NG/L — SIGNIFICANT CHANGE UP
WBC # BLD: 4.79 K/UL — SIGNIFICANT CHANGE UP (ref 3.8–10.5)
WBC # FLD AUTO: 4.79 K/UL — SIGNIFICANT CHANGE UP (ref 3.8–10.5)

## 2022-01-18 PROCEDURE — 93010 ELECTROCARDIOGRAM REPORT: CPT

## 2022-01-18 PROCEDURE — 99284 EMERGENCY DEPT VISIT MOD MDM: CPT

## 2022-01-18 PROCEDURE — 0042T: CPT

## 2022-01-18 PROCEDURE — 70498 CT ANGIOGRAPHY NECK: CPT | Mod: 26,MA

## 2022-01-18 PROCEDURE — 70496 CT ANGIOGRAPHY HEAD: CPT | Mod: 26,MA

## 2022-01-18 RX ORDER — ASCORBIC ACID 60 MG
500 TABLET,CHEWABLE ORAL DAILY
Refills: 0 | Status: DISCONTINUED | OUTPATIENT
Start: 2022-01-18 | End: 2022-01-21

## 2022-01-18 RX ORDER — CELECOXIB 200 MG/1
200 CAPSULE ORAL DAILY
Refills: 0 | Status: DISCONTINUED | OUTPATIENT
Start: 2022-01-18 | End: 2022-01-21

## 2022-01-18 RX ORDER — CALCIUM CARBONATE 500(1250)
2 TABLET ORAL DAILY
Refills: 0 | Status: DISCONTINUED | OUTPATIENT
Start: 2022-01-18 | End: 2022-01-21

## 2022-01-18 RX ORDER — RIVAROXABAN 15 MG-20MG
10 KIT ORAL
Refills: 0 | Status: DISCONTINUED | OUTPATIENT
Start: 2022-01-18 | End: 2022-01-21

## 2022-01-18 RX ORDER — RIVAROXABAN 15 MG-20MG
1 KIT ORAL
Qty: 0 | Refills: 0 | DISCHARGE

## 2022-01-18 RX ORDER — ASPIRIN/CALCIUM CARB/MAGNESIUM 324 MG
325 TABLET ORAL ONCE
Refills: 0 | Status: COMPLETED | OUTPATIENT
Start: 2022-01-18 | End: 2022-01-18

## 2022-01-18 RX ORDER — ASPIRIN/CALCIUM CARB/MAGNESIUM 324 MG
81 TABLET ORAL DAILY
Refills: 0 | Status: DISCONTINUED | OUTPATIENT
Start: 2022-01-18 | End: 2022-01-20

## 2022-01-18 RX ORDER — ACETAMINOPHEN 500 MG
2 TABLET ORAL
Qty: 0 | Refills: 0 | DISCHARGE

## 2022-01-18 RX ORDER — LATANOPROST 0.05 MG/ML
1 SOLUTION/ DROPS OPHTHALMIC; TOPICAL AT BEDTIME
Refills: 0 | Status: DISCONTINUED | OUTPATIENT
Start: 2022-01-18 | End: 2022-01-21

## 2022-01-18 RX ORDER — CHOLECALCIFEROL (VITAMIN D3) 125 MCG
2000 CAPSULE ORAL DAILY
Refills: 0 | Status: DISCONTINUED | OUTPATIENT
Start: 2022-01-18 | End: 2022-01-21

## 2022-01-18 RX ORDER — AZATHIOPRINE 100 MG/1
1 TABLET ORAL
Qty: 0 | Refills: 0 | DISCHARGE

## 2022-01-18 RX ORDER — SOLIFENACIN SUCCINATE 10 MG/1
1 TABLET ORAL
Qty: 0 | Refills: 0 | DISCHARGE

## 2022-01-18 RX ORDER — CYCLOSPORINE 100 MG/1
100 CAPSULE ORAL DAILY
Refills: 0 | Status: DISCONTINUED | OUTPATIENT
Start: 2022-01-18 | End: 2022-01-21

## 2022-01-18 RX ORDER — ATORVASTATIN CALCIUM 80 MG/1
80 TABLET, FILM COATED ORAL AT BEDTIME
Refills: 0 | Status: DISCONTINUED | OUTPATIENT
Start: 2022-01-18 | End: 2022-01-19

## 2022-01-18 RX ORDER — POLYETHYLENE GLYCOL 3350 17 G/17G
17 POWDER, FOR SOLUTION ORAL DAILY
Refills: 0 | Status: DISCONTINUED | OUTPATIENT
Start: 2022-01-18 | End: 2022-01-21

## 2022-01-18 RX ORDER — FOLIC ACID 0.8 MG
1 TABLET ORAL DAILY
Refills: 0 | Status: DISCONTINUED | OUTPATIENT
Start: 2022-01-18 | End: 2022-01-21

## 2022-01-18 RX ORDER — FINASTERIDE 5 MG/1
5 TABLET, FILM COATED ORAL DAILY
Refills: 0 | Status: DISCONTINUED | OUTPATIENT
Start: 2022-01-18 | End: 2022-01-21

## 2022-01-18 RX ADMIN — Medication 325 MILLIGRAM(S): at 20:20

## 2022-01-18 NOTE — ED PROVIDER NOTE - NSICDXPASTMEDICALHX_GEN_ALL_CORE_FT
PAST MEDICAL HISTORY:  BBB (bundle branch block) right    BPH (benign prostatic hyperplasia)     Breast lump     CAD (coronary artery disease) 5 stents    Cardiac angina     Carotid stenosis, right     DVT (deep venous thrombosis)     Eczema     Hernia     Hypertension     Iliac aneurysm     Neuropathy     LISA (obstructive sleep apnea)     Pituitary adenoma     Vasovagal syndrome

## 2022-01-18 NOTE — H&P ADULT - ATTENDING COMMENTS
78 y/o male with PMHx of CAD s/p 5 stents, DVT, HTN, BPH, carotid stenosis, dysequilibrium s/p ACDF C5-C7, PARTIAL CORPECTOMY C6 presents with the complaint of temperature difference between the right and left side of the body.  1.Neurology eval.  2.CAD-cont cardiac medication.  3.HTN-cont bp medication.  4.Lipid d/o-statin.  5.GI and DVT prophylaxis.

## 2022-01-18 NOTE — H&P ADULT - PROBLEM SELECTOR PLAN 6
IMPROVE VTE Individual Risk Assessment  RISK                                                                Points  [  ] Previous VTE                                                  3  [  ] Thrombophilia                                               2  [  ] Lower limb paralysis                                      2        (unable to hold up >15 seconds)    [  ] Current Cancer                                              2         (within 6 months)  [x  ] Immobilization > 24 hrs                                1  [  ] ICU/CCU stay > 24 hours                              1  [x  ] Age > 60                                                      1  IMPROVE VTE Score _________2, -- for DVT proph  On Xarelto

## 2022-01-18 NOTE — ED PROVIDER NOTE - OBJECTIVE STATEMENT
78 y/o male with PMHx of CAD, DVT, HTN, BPH,. carotid stenosis presents with right sided numbness since approx 11:30am.  Pt said he first noticed that his right side felt colder than the other side, and then he noticed the numb feeling.  The patient thought the symptoms would improve, however when the did not the patient called his PMD.  The PMD called back an hour later and advised the patient to go to the ED.  The patient denies any weakness, no facial droop, no weakness.

## 2022-01-18 NOTE — H&P ADULT - PROBLEM SELECTOR PLAN 4
On Cyclosporine at home   Takes bactrim 3 times a week prophylactically   Will continue home cyclosporine

## 2022-01-18 NOTE — H&P ADULT - HISTORY OF PRESENT ILLNESS
78 y/o male with PMHx of CAD s/p 5 stents, DVT, HTN, BPH, carotid stenosis, dysequilibrium s/p ACDF C5-C7, PARTIAL CORPECTOMY C6 presents with the complaint of temperature difference between the right and left side of the body. Pt states that at around 12:30-1pm last night   Pt said he first noticed that his right side felt colder than the other side, and then he noticed the numb feeling.  The patient thought the symptoms would improve, however when the did not the patient called his PMD.  The PMD called back an hour later and advised the patient to go to the ED.  The patient denies any weakness, no facial droop, no weakness   76 y/o male with PMHx of CAD s/p 5 stents, DVT, HTN, BPH, carotid stenosis, dysequilibrium s/p ACDF C5-C7, PARTIAL CORPECTOMY C6 presents with the complaint of temperature difference between the right and left side of the body. Pt states that at around 12:30-1pm this afternoon  he noticed that his right side felt warmer than the other side. He states he was outside in the car waiting for his wife when the symptom started. He states he stills feels it, states right side of the body feels like a radiator from head to toe.  The patient thought the symptoms would improve, however when the did not the patient called his PMD.  The PMD called back an hour later and advised the patient to go to the ED.  The patient denies any numbness, weakness, facial droop, LOC, seizures, headaches,  vision or hearing changes. He states he had neuropathy in his feet.

## 2022-01-18 NOTE — ED PROVIDER NOTE - CLINICAL SUMMARY MEDICAL DECISION MAKING FREE TEXT BOX
Pt Pt presents with right sided numbness/ tingling sensation since approx 11:30 am this afternoon.  No motor deficits on exam.  stroke scale of 1. Pt outside window for tPA, may be candidate for endovascular rescues.  stroke code called.

## 2022-01-18 NOTE — ED PROVIDER NOTE - NSICDXPASTSURGICALHX_GEN_ALL_CORE_FT
PAST SURGICAL HISTORY:  Breast lump removed    Elective surgery colon polyps removal    Elective surgery prostate ablation    Elective surgery ivc filter1/14/2020    H/O hernia repair     History of carpal tunnel release of both wrists     History of coronary artery stent placement x 5 stents    History of shoulder surgery left    History of strabismus surgery     History of total knee replacement, left     History of vasectomy     S/P angioplasty

## 2022-01-18 NOTE — H&P ADULT - ASSESSMENT
78 y/o male with PMHx of CAD s/p 5 stents, DVT, HTN, BPH, carotid stenosis, dysequilibrium s/p ACDF C5-C7, PARTIAL CORPECTOMY C6 presents with the complaint of temperature difference between the right and left side of the body. Admitted for stroke r/o

## 2022-01-18 NOTE — H&P ADULT - PROBLEM SELECTOR PLAN 1
TIA vs Stroke vs Neuropathy vs hyperthyroidism  Has Hx of HTN   Code stroke initiated in ED but out of window for tPA  CT Head: No acute intracranial hemorrhage or acute territorial infarct.  CT perfusion: nondiagnostic due to artifacts.  No hemodynamically significant stenosis in the neck.  CT neck: Thyroid nodule noted in the isthmus.  NIH Stroke scale: 0  Will start on Aspirin, High does atorvastatin   Will hold plavix till further recommendations from neurology   Permissive HTN for 24 hours  Follow TSH, lipid profile and A1c  Will order echo with Bubble study   Will likely need MRI to r/o stroke  Admit to tele  Neurology Dr. Roblero consulted

## 2022-01-18 NOTE — ED ADULT NURSE NOTE - OBJECTIVE STATEMENT
pt presents to ed for tingling and warm feeling to right side of body and right facial droop. pt denies headache, sob, chest pain.

## 2022-01-18 NOTE — H&P ADULT - NSHPPHYSICALEXAM_GEN_ALL_CORE
LOS:     VITALS:   T(C): 36.9 (01-18-22 @ 20:57), Max: 36.9 (01-18-22 @ 20:57)  HR: 50 (01-18-22 @ 20:57) (50 - 77)  BP: 140/79 (01-18-22 @ 20:57) (140/79 - 149/72)  RR: 18 (01-18-22 @ 20:57) (18 - 18)  SpO2: 97% (01-18-22 @ 20:57) (97% - 99%)    GENERAL: NAD, lying in bed comfortably  HEAD:  Atraumatic, Normocephalic  EYES: EOMI, PERRLA, conjunctiva and sclera clear  ENT: Moist mucous membranes  NECK: Supple, No JVD  CHEST/LUNG: Clear to auscultation bilaterally; No rales, rhonchi, wheezing, or rubs. Unlabored respirations  HEART: Regular rate and rhythm; No murmurs, rubs, or gallops  ABDOMEN: BSx4; Soft, nontender, nondistended  EXTREMITIES:  2+ Peripheral Pulses, brisk capillary refill. No clubbing, cyanosis, or edema  NERVOUS SYSTEM:  A&Ox3, no focal deficits   SKIN: No rashes or lesions

## 2022-01-18 NOTE — ED ADULT NURSE NOTE - INTERVENTIONS DEFINITIONS
[place] to call system/Instruct patient to call for assistance/Stretcher in lowest position, wheels locked, appropriate side rails in place/Monitor for mental status changes and reorient to person, place, and time

## 2022-01-19 DIAGNOSIS — Z02.9 ENCOUNTER FOR ADMINISTRATIVE EXAMINATIONS, UNSPECIFIED: ICD-10-CM

## 2022-01-19 LAB
A1C WITH ESTIMATED AVERAGE GLUCOSE RESULT: 5.2 % — SIGNIFICANT CHANGE UP (ref 4–5.6)
ALBUMIN SERPL ELPH-MCNC: 2.7 G/DL — LOW (ref 3.5–5)
ALP SERPL-CCNC: 53 U/L — SIGNIFICANT CHANGE UP (ref 40–120)
ALT FLD-CCNC: 18 U/L DA — SIGNIFICANT CHANGE UP (ref 10–60)
ANION GAP SERPL CALC-SCNC: 5 MMOL/L — SIGNIFICANT CHANGE UP (ref 5–17)
AST SERPL-CCNC: 12 U/L — SIGNIFICANT CHANGE UP (ref 10–40)
BILIRUB SERPL-MCNC: 0.4 MG/DL — SIGNIFICANT CHANGE UP (ref 0.2–1.2)
BUN SERPL-MCNC: 28 MG/DL — HIGH (ref 7–18)
CALCIUM SERPL-MCNC: 9.1 MG/DL — SIGNIFICANT CHANGE UP (ref 8.4–10.5)
CHLORIDE SERPL-SCNC: 107 MMOL/L — SIGNIFICANT CHANGE UP (ref 96–108)
CHOLEST SERPL-MCNC: 124 MG/DL — SIGNIFICANT CHANGE UP
CO2 SERPL-SCNC: 28 MMOL/L — SIGNIFICANT CHANGE UP (ref 22–31)
CREAT SERPL-MCNC: 0.9 MG/DL — SIGNIFICANT CHANGE UP (ref 0.5–1.3)
ESTIMATED AVERAGE GLUCOSE: 103 MG/DL — SIGNIFICANT CHANGE UP (ref 68–114)
GLUCOSE SERPL-MCNC: 92 MG/DL — SIGNIFICANT CHANGE UP (ref 70–99)
HCT VFR BLD CALC: 37 % — LOW (ref 39–50)
HCV AB S/CO SERPL IA: 0.2 S/CO — SIGNIFICANT CHANGE UP (ref 0–0.99)
HCV AB SERPL-IMP: SIGNIFICANT CHANGE UP
HDLC SERPL-MCNC: 61 MG/DL — SIGNIFICANT CHANGE UP
HGB BLD-MCNC: 12.3 G/DL — LOW (ref 13–17)
LIPID PNL WITH DIRECT LDL SERPL: 53 MG/DL — SIGNIFICANT CHANGE UP
MAGNESIUM SERPL-MCNC: 2 MG/DL — SIGNIFICANT CHANGE UP (ref 1.6–2.6)
MCHC RBC-ENTMCNC: 32.3 PG — SIGNIFICANT CHANGE UP (ref 27–34)
MCHC RBC-ENTMCNC: 33.2 GM/DL — SIGNIFICANT CHANGE UP (ref 32–36)
MCV RBC AUTO: 97.1 FL — SIGNIFICANT CHANGE UP (ref 80–100)
NON HDL CHOLESTEROL: 63 MG/DL — SIGNIFICANT CHANGE UP
NRBC # BLD: 0 /100 WBCS — SIGNIFICANT CHANGE UP (ref 0–0)
PHOSPHATE SERPL-MCNC: 3 MG/DL — SIGNIFICANT CHANGE UP (ref 2.5–4.5)
PLATELET # BLD AUTO: 167 K/UL — SIGNIFICANT CHANGE UP (ref 150–400)
POTASSIUM SERPL-MCNC: 3.8 MMOL/L — SIGNIFICANT CHANGE UP (ref 3.5–5.3)
POTASSIUM SERPL-SCNC: 3.8 MMOL/L — SIGNIFICANT CHANGE UP (ref 3.5–5.3)
PROT SERPL-MCNC: 5.8 G/DL — LOW (ref 6–8.3)
RBC # BLD: 3.81 M/UL — LOW (ref 4.2–5.8)
RBC # FLD: 12.8 % — SIGNIFICANT CHANGE UP (ref 10.3–14.5)
SODIUM SERPL-SCNC: 140 MMOL/L — SIGNIFICANT CHANGE UP (ref 135–145)
TRIGL SERPL-MCNC: 49 MG/DL — SIGNIFICANT CHANGE UP
TSH SERPL-MCNC: 3.41 UU/ML — SIGNIFICANT CHANGE UP (ref 0.34–4.82)
WBC # BLD: 3.5 K/UL — LOW (ref 3.8–10.5)
WBC # FLD AUTO: 3.5 K/UL — LOW (ref 3.8–10.5)

## 2022-01-19 PROCEDURE — 99222 1ST HOSP IP/OBS MODERATE 55: CPT

## 2022-01-19 PROCEDURE — 70553 MRI BRAIN STEM W/O & W/DYE: CPT | Mod: 26

## 2022-01-19 RX ORDER — PANTOPRAZOLE SODIUM 20 MG/1
40 TABLET, DELAYED RELEASE ORAL
Refills: 0 | Status: DISCONTINUED | OUTPATIENT
Start: 2022-01-19 | End: 2022-01-21

## 2022-01-19 RX ORDER — ATORVASTATIN CALCIUM 80 MG/1
10 TABLET, FILM COATED ORAL AT BEDTIME
Refills: 0 | Status: DISCONTINUED | OUTPATIENT
Start: 2022-01-19 | End: 2022-01-20

## 2022-01-19 RX ADMIN — RIVAROXABAN 10 MILLIGRAM(S): KIT at 17:24

## 2022-01-19 RX ADMIN — Medication 500 MILLIGRAM(S): at 12:58

## 2022-01-19 RX ADMIN — POLYETHYLENE GLYCOL 3350 17 GRAM(S): 17 POWDER, FOR SOLUTION ORAL at 23:22

## 2022-01-19 RX ADMIN — Medication 2 TABLET(S): at 12:58

## 2022-01-19 RX ADMIN — CYCLOSPORINE 100 MILLIGRAM(S): 100 CAPSULE ORAL at 12:58

## 2022-01-19 RX ADMIN — ATORVASTATIN CALCIUM 10 MILLIGRAM(S): 80 TABLET, FILM COATED ORAL at 23:22

## 2022-01-19 RX ADMIN — LATANOPROST 1 DROP(S): 0.05 SOLUTION/ DROPS OPHTHALMIC; TOPICAL at 23:26

## 2022-01-19 RX ADMIN — CELECOXIB 200 MILLIGRAM(S): 200 CAPSULE ORAL at 13:27

## 2022-01-19 RX ADMIN — Medication 81 MILLIGRAM(S): at 12:57

## 2022-01-19 RX ADMIN — CELECOXIB 200 MILLIGRAM(S): 200 CAPSULE ORAL at 12:57

## 2022-01-19 RX ADMIN — FINASTERIDE 5 MILLIGRAM(S): 5 TABLET, FILM COATED ORAL at 12:58

## 2022-01-19 RX ADMIN — Medication 2000 UNIT(S): at 12:57

## 2022-01-19 RX ADMIN — Medication 1 MILLIGRAM(S): at 12:58

## 2022-01-19 NOTE — CONSULT NOTE ADULT - ASSESSMENT
A/P: Although his signs and symptoms are most consistent with a lacunar stroke, in view of previous stents ataxia, pituitary adenoma , a larger, embolic stroke/ neoplasm /cerebellar disease will have to be investigated by MRI. Pituitary and cerebellum are poorly imaged by CT.    - No IV tpa given because NIHSS is low…  - ASA/ PLavix  - Statin  - Dysphagia screen  - DVT prophylaxis  - MRI brain w/wo gadolinum    - PT eval  - Speech/swallow eval    Total Critical Care Time spent:

## 2022-01-19 NOTE — PROGRESS NOTE ADULT - PROBLEM SELECTOR PLAN 6
IMPROVE VTE Individual Risk Assessment  RISK                                                                Points  [  ] Previous VTE                                                  3  [  ] Thrombophilia                                               2  [  ] Lower limb paralysis                                      2        (unable to hold up >15 seconds)    [  ] Current Cancer                                              2         (within 6 months)  [x  ] Immobilization > 24 hrs                                1  [  ] ICU/CCU stay > 24 hours                              1  [x  ] Age > 60                                                      1  IMPROVE VTE Score _________2, -- for DVT proph  On Xarelto On Xarelto for DVT x 2 yrs  PPI for GI ppx

## 2022-01-19 NOTE — PROGRESS NOTE ADULT - ASSESSMENT
76 y/o male with PMHx of CAD s/p 5 stents, DVT, HTN, BPH, carotid stenosis, dysequilibrium s/p ACDF C5-C7, PARTIAL CORPECTOMY C6 presents with the complaint of temperature difference between the right and left side of the body.  1.Neurology eval.  2.CAD-cont cardiac medication.  3.HTN-cont bp medication.  4.Lipid d/o-statin.  5.GI and DVT prophylaxis.

## 2022-01-19 NOTE — PROGRESS NOTE ADULT - SUBJECTIVE AND OBJECTIVE BOX
CARDIOLOGY/MEDICAL ATTENDING    CHIEF COMPLAINT:Patient is a 77y old  Male who presents with a chief complaint of Stroke .Pt reports no sig change since yesterday.    	  REVIEW OF SYSTEMS:  CONSTITUTIONAL: No fever, weight loss, or fatigue  EYES: No eye pain, visual disturbances, or discharge  ENT:  No difficulty hearing, tinnitus, vertigo; No sinus or throat pain  NECK: No pain or stiffness  RESPIRATORY: No cough, wheezing, chills or hemoptysis; No Shortness of Breath  CARDIOVASCULAR: No chest pain, palpitations, passing out, dizziness, or leg swelling  GASTROINTESTINAL: No abdominal or epigastric pain. No nausea, vomiting, or hematemesis; No diarrhea or constipation. No melena or hematochezia.  GENITOURINARY: No dysuria, frequency, hematuria, or incontinence  NEUROLOGICAL: No headaches, memory loss, loss of strength, numbness, or tremors  SKIN: No itching, burning, rashes, or lesions   LYMPH Nodes: No enlarged glands  ENDOCRINE: No heat or cold intolerance; No hair loss  MUSCULOSKELETAL: No joint pain or swelling; No muscle, back, or extremity pain  PSYCHIATRIC: No depression, anxiety, mood swings, or difficulty sleeping  HEME/LYMPH: No easy bruising, or bleeding gums  ALLERGY AND IMMUNOLOGIC: No hives or eczema	        PHYSICAL EXAM:  T(C): 36.3 (01-19-22 @ 08:58), Max: 36.9 (01-18-22 @ 20:57)  HR: 50 (01-19-22 @ 08:58) (48 - 77)  BP: 149/76 (01-19-22 @ 08:58) (117/68 - 149/76)  RR: 18 (01-19-22 @ 08:58) (18 - 18)  SpO2: 97% (01-19-22 @ 08:58) (96% - 99%)  Wt(kg): --  I&O's Summary    18 Jan 2022 07:01  -  19 Jan 2022 07:00  --------------------------------------------------------  IN: 0 mL / OUT: 620 mL / NET: -620 mL        Appearance: Normal	  HEENT:   Normal oral mucosa, PERRL, EOMI	  Lymphatic: No lymphadenopathy  Cardiovascular: Normal S1 S2, No JVD, No murmurs, No edema  Respiratory: Lungs clear to auscultation	  Psychiatry: A & O x 3, Mood & affect appropriate  Gastrointestinal:  Soft, Non-tender, + BS	  Skin: No rashes, No ecchymoses, No cyanosis	  Neurologic: Non-focal  Extremities: Normal range of motion, No clubbing, cyanosis or edema  Vascular: Peripheral pulses palpable 2+ bilaterally    MEDICATIONS  (STANDING):  ascorbic acid 500 milliGRAM(s) Oral daily  aspirin enteric coated 81 milliGRAM(s) Oral daily  atorvastatin 80 milliGRAM(s) Oral at bedtime  calcium carbonate    500 mG (Tums) Chewable 2 Tablet(s) Chew daily  celecoxib 200 milliGRAM(s) Oral daily  cholecalciferol 2000 Unit(s) Oral daily  cycloSPORINE  (SandIMMUNE) 100 milliGRAM(s) Oral daily  finasteride 5 milliGRAM(s) Oral daily  folic acid 1 milliGRAM(s) Oral daily  latanoprost 0.005% Ophthalmic Solution 1 Drop(s) Both EYES at bedtime  pantoprazole    Tablet 40 milliGRAM(s) Oral before breakfast  polyethylene glycol 3350 17 Gram(s) Oral daily  rivaroxaban 10 milliGRAM(s) Oral with dinner      	  	  LABS:	 	        Troponin I, High Sensitivity Result: 7.2 ng/L (01-18 @ 17:50)                            12.3   3.50  )-----------( 167      ( 19 Jan 2022 06:52 )             37.0     01-19    140  |  107  |  28<H>  ----------------------------<  92  3.8   |  28  |  0.90    Ca    9.1      19 Jan 2022 06:52  Phos  3.0     01-19  Mg     2.0     01-19    TPro  5.8<L>  /  Alb  2.7<L>  /  TBili  0.4  /  DBili  x   /  AST  12  /  ALT  18  /  AlkPhos  53  01-19    proBNP:   Lipid Profile: Cholesterol 124  LDL --  HDL 61  TG 49  Ldl calc 53  Ratio --    HgA1c:   TSH: Thyroid Stimulating Hormone, Serum: 3.41 uU/mL (01-19 @ 06:52)      	  ct< from: CT Perfusion w/ Maps w/ IV Cont (01.18.22 @ 17:27) >  ACC: [id number] EXAM:  CT PERFUSION W MAPS IC                        ACC: [id number] EXAM:  CT ANGIO BRAIN (W)AW IC                        ACC: [id number] EXAM:  CT BRAIN STROKE PROTOCOL                        ACC: [id number] EXAM:  CT ANGIO NECK (W)AW                         PROCEDURE DATE:  01/18/2022          INTERPRETATION:  CLINICAL STATEMENT: CVA    TECHNIQUE: CTA brain and neck. CT perfusion: After the administration of   50  cc of Omnipaque 300 serial thin sections were obtained through thebrain   the  purposes of evaluating CT perfusion. Raw data was sent to the ischemia   rapid  view software for postprocessing. 90 cc Omnipaque 350 Intravenous contrast  was administered for the CTA compartment. 2-D MIP and 3-D volume rendering  images.    COMPARISON: None.    FINDINGS:  CTA of the neck:      The common carotid and internal carotid arteries are patent. Calcified   atherosclerotic plaques carotid bulbs noted.    The extracranial vertebral arteries are patent. Dominant right vertebral   artery    Thyroid nodule noted in the isthmus. Multilevel degenerative changes   noted in the cervical spine. Anterior cervical fusion also noted.    CTA Head:  Calcified atherosclerotic plaques at the cavernous and supraclinoid   internal carotid arteries resulting in narrowing of bilateral   supraclinoid internal carotid arteries.    The proximal anterior, middle and posterior cerebral arteries are patent.   Hypoplastic A1 segment right anterior cerebral artery Mild narrowing in   the proximal left posterior cerebral artery. Fetal origin right posterior   cerebral artery.    The intracranial vertebral and basilar arteries are patent. Distal left   vertebral artery terminates as the posterior inferior cerebellar artery.   Mild narrowing at the V4 segment of distal right vertebral artery due to   calcified atherosclerotic plaque    There is no intracranial aneurysm.    CT Head:  There is mild diffuse parenchymal volume loss. There are areas of low   attenuation in the periventricular white matter likely related to mild   chronic microvascular ischemic changes.    There is no acute intracranial hemorrhage, parenchymal mass, mass effect   or midline shift. There is no acute territorial infarct. There is no   hydrocephalus. Partial empty sella    The cranium is intact. Mucosal thickening paranasal sinuses.    CT perfusion:  No core infarct identified.    Elevated Tmax noted bilateral frontal and parietal lobes likely   artifactual.    CBF<30% volume: 0 ml  Tmax>6.0 s volume: 114 ml  Mismatch volume: 114 ml  Mismatch ratio: Infinite    IMPRESSION:     No acute intracranial hemorrhage or acute territorial infarct. If acute   ischemia is a strong clinical concern, MRI exam is recommended for   further evaluation if there is no contraindication. Noncontrast head CT   report notified to Dr. Mcdaniel on 1/18/2022 at 5:32 PM      CT perfusion nondiagnostic due to artifacts.    No hemodynamically significant stenosis in the neck.    Intracranial narrowing as described above        MARLENE HERRERA MD; Attending Radiologist  This document has been electronically signed. Jan 18 2022  5:57PM

## 2022-01-19 NOTE — CONSULT NOTE ADULT - SUBJECTIVE AND OBJECTIVE BOX
Patient is a 77y old  Male who presents with a chief complaint of Stroke (19 Jan 2022 12:56)      HPI:  `76 y/o male with PMHx of CAD s/p 5 stents, DVT, HTN, BPH, carotid stenosis, dysequilibrium s/p ACDF C5-C7, PARTIAL CORPECTOMY C6 presents with the complaint of temperature difference between the right and left side of the body. Pt states that at around 12:30-1pm this afternoon  he noticed that his right side felt warmer than the other side. He states he was outside in the car waiting for his wife when the symptom started. He states he stills feels it, states right side of the body feels like a radiator from head to toe.  The patient thought the symptoms would improve, however when the did not the patient called his PMD.  The PMD called back an hour later and advised the patient to go to the ED.  The patient denies any numbness, weakness, facial droop, LOC, seizures, headaches,  vision or hearing changes. He states he had neuropathy in his feet.   PAST MEDICAL & SURGICAL HISTORY:  Cardiac angina  Hypertension  BPH (benign prostatic hyperplasia)  BBB (bundle branch block)  right  LISA (obstructive sleep apnea)  Vasovagal syndrome  Iliac aneurysm  Carotid stenosis, right  Neuropathy  Pituitary adenoma  Hernia  DVT (deep venous thrombosis)  CAD (coronary artery disease)  5 stents  Eczema  Breast lump  S/P angioplasty  History of coronary artery stent placement  x 5 stents  History of total knee replacement, left  History of strabismus surgery  History of carpal tunnel release of both wrists  History of shoulder surgery  left    History of vasectomy    H/O hernia repair    Elective surgery  colon polyps removal    Elective surgery  prostate ablation    Elective surgery  ivc filter1/14/2020    Breast lump  removed        FAMILY HISTORY:        Social Hx:  Nonsmoker, no drug or alcohol use    MEDICATIONS  (STANDING):  ascorbic acid 500 milliGRAM(s) Oral daily  aspirin enteric coated 81 milliGRAM(s) Oral daily  atorvastatin 80 milliGRAM(s) Oral at bedtime  calcium carbonate    500 mG (Tums) Chewable 2 Tablet(s) Chew daily  celecoxib 200 milliGRAM(s) Oral daily  cholecalciferol 2000 Unit(s) Oral daily  cycloSPORINE  (SandIMMUNE) 100 milliGRAM(s) Oral daily  finasteride 5 milliGRAM(s) Oral daily  folic acid 1 milliGRAM(s) Oral daily  latanoprost 0.005% Ophthalmic Solution 1 Drop(s) Both EYES at bedtime  pantoprazole    Tablet 40 milliGRAM(s) Oral before breakfast  polyethylene glycol 3350 17 Gram(s) Oral daily  rivaroxaban 10 milliGRAM(s) Oral with dinner       Allergies    Levaquin (Rash)  rifampin (Rash)    Intolerances        ROS: Pertinent positives in HPI, all other ROS were reviewed and are negative.      Vital Signs Last 24 Hrs  T(C): 36.4 (19 Jan 2022 13:40), Max: 36.9 (18 Jan 2022 20:57)  T(F): 97.6 (19 Jan 2022 13:40), Max: 98.5 (18 Jan 2022 20:57)  HR: 58 (19 Jan 2022 13:40) (48 - 77)  BP: 156/73 (19 Jan 2022 13:40) (117/68 - 156/73)  BP(mean): --  RR: 18 (19 Jan 2022 13:40) (18 - 18)  SpO2: 97% (19 Jan 2022 13:40) (96% - 99%)        Constitutional: awake and alert.  HEENT: PERRLA, EOMI,   Neck: Supple.  Respiratory: Breath sounds are clear bilaterally  Cardiovascular: S1 and S2, regular rhythm  Gastrointestinal: soft, nontender  Extremities:  no edema  Vascular: Carotid Bruit - no  Musculoskeletal: no joint swelling/tenderness, no abnormal movements  Skin: No rashes    Neurological exam:  HF: A x O x 3. Appropriately interactive, normal affect. Speech fluent, No Aphasia or paraphasic errors. Naming /repetition intact   CN: CLARIBEL, EOMI, VFF, facial sensation normal, no NLFD, tongue midline, Palate moves equally, SCM equal bilaterally  Motor: No pronator drift, Strength 5/5 in all 4 ext, normal bulk and tone, no tremor, rigidity or bradykinesia.    Sens: Intact to light touch / PP/ VS/ JS    Reflexes: Symmetric and normal . BJ 2+, BR 2+, KJ 2+, AJ 2+, downgoing toes b/l  Coord:  No FNFA, dysmetria, JENARO intact   Gait/Balance: Romberg's positive; cannot walk tandem    NIHSS:          Labs:                        12.3   3.50  )-----------( 167      ( 19 Jan 2022 06:52 )             37.0     01-19    140  |  107  |  28<H>  ----------------------------<  92  3.8   |  28  |  0.90    Ca    9.1      19 Jan 2022 06:52  Phos  3.0     01-19  Mg     2.0     01-19    TPro  5.8<L>  /  Alb  2.7<L>  /  TBili  0.4  /  DBili  x   /  AST  12  /  ALT  18  /  AlkPhos  53  01-19 01-19 Chol 124 LDL -- HDL 61 Trig 49  PT/INR - ( 18 Jan 2022 17:50 )   PT: 15.7 sec;   INR: 1.34 ratio         PTT - ( 18 Jan 2022 17:50 )  PTT:52.7 sec    Radiology report:  - CT head:  < from: CT Angio Neck w/ IV Cont (01.18.22 @ 17:28) >  ACC: [id number] EXAM:  CT PERFUSION W MAPS IC                        ACC: [id number] EXAM:  CT ANGIO BRAIN (W)AW IC                        ACC: [id number] EXAM:  CT BRAIN STROKE PROTOCOL                        ACC: [id number] EXAM:  CT ANGIO NECK (W)AW IC                        PROCEDURE DATE:  01/18/2022          INTERPRETATION:  CLINICAL STATEMENT: CVA    TECHNIQUE: CTA brain and neck. CT perfusion: After the administration of   50  cc of Omnipaque 300 serial thin sections were obtained through thebrain   the  purposes of evaluating CT perfusion. Raw data was sent to the ischemia   rapid  view software for postprocessing. 90 cc Omnipaque 350 Intravenous contrast  was administered for the CTA compartment. 2-D MIP and 3-D volume rendering  images.    COMPARISON: None.    FINDINGS:  CTA of the neck:      The common carotid and internal carotid arteries are patent. Calcified   atherosclerotic plaques carotid bulbs noted.    The extracranial vertebral arteries are patent. Dominant right vertebral   artery    Thyroid nodule noted in the isthmus. Multilevel degenerative changes   noted in the cervical spine. Anterior cervical fusion also noted.    CTA Head:  Calcified atherosclerotic plaques at the cavernous and supraclinoid   internal carotid arteries resulting in narrowing of bilateral   supraclinoid internal carotid arteries.    The proximal anterior, middle and posterior cerebral arteries are patent.   Hypoplastic A1 segment right anterior cerebral artery Mild narrowing in   the proximal left posterior cerebral artery. Fetal origin right posterior   cerebral artery.    The intracranial vertebral and basilar arteries are patent. Distal left   vertebral artery terminates as the posterior inferior cerebellar artery.   Mild narrowing at the V4 segment of distal right vertebral artery due to   calcified atherosclerotic plaque    There is no intracranial aneurysm.    CT Head:  There is mild diffuse parenchymal volume loss. There are areas of low   attenuation in the periventricular white matter likely related to mild   chronic microvascular ischemic changes.    There is no acute intracranial hemorrhage, parenchymal mass, mass effect   or midline shift. There is no acute territorial infarct. There is no   hydrocephalus. Partial empty sella    The cranium is intact. Mucosal thickening paranasal sinuses.    CT perfusion:  No core infarct identified.    Elevated Tmax noted bilateral frontal and parietal lobes likely   artifactual.    CBF<30% volume: 0 ml  Tmax>6.0 s volume: 114 ml  Mismatch volume: 114 ml  Mismatch ratio: Infinite    IMPRESSION:     No acute intracranial hemorrhage or acute territorial infarct. If acute   ischemia is a strong clinical concern, MRI exam is recommended for   further evaluation if there is no contraindication. Noncontrast head CT   report notified to Dr. Mcdaniel on 1/18/2022 at 5:32 PM      CT perfusion nondiagnostic due to artifacts.    No hemodynamically significant stenosis in the neck.    Intracranial narrowing as described above    --- End of Report ---      < end of copied text >

## 2022-01-19 NOTE — PATIENT PROFILE ADULT - FALL HARM RISK - RISK INTERVENTIONS

## 2022-01-19 NOTE — PROGRESS NOTE ADULT - PROBLEM SELECTOR PLAN 1
TIA vs Stroke vs Neuropathy vs hyperthyroidism  Has Hx of HTN   Code stroke initiated in ED but out of window for tPA  CT Head: No acute intracranial hemorrhage or acute territorial infarct.  CT perfusion: nondiagnostic due to artifacts.  No hemodynamically significant stenosis in the neck.  CT neck: Thyroid nodule noted in the isthmus.  NIH Stroke scale: 0  Will start on Aspirin, High does atorvastatin   Will hold plavix till further recommendations from neurology   Permissive HTN for 24 hours  Follow TSH, lipid profile and A1c  Will order echo with Bubble study   Will likely need MRI to r/o stroke  Admit to tele  Neurology Dr. Roblero consulted TIA vs Stroke vs Neuropathy vs hyperthyroidism  Has Hx of HTN   Code stroke initiated in ED but out of window for tPA  CT Head: No acute intracranial hemorrhage or acute territorial infarct.  CT perfusion: nondiagnostic due to artifacts.  No hemodynamically significant stenosis in the neck.  CT neck: Thyroid nodule noted in the isthmus.  NIH Stroke scale: 0  continue  Aspirin, atorvastatin 80mg   Will hold plavix till further recommendations from neurology   Permissive HTN for 24 hours  TSH, A1C ad lipid profile all within normal limits   f/u echo with Bubble study   Will likely need MRI to r/o stroke  Admit to tele  Neurology Dr. Roblero consulted TIA vs Stroke vs Neuropathy vs hyperthyroidism  Has Hx of HTN   Code stroke initiated in ED but out of window for tPA  CT Head: No acute intracranial hemorrhage or acute territorial infarct.  CT perfusion: nondiagnostic due to artifacts.  No hemodynamically significant stenosis in the neck.  CT neck: Thyroid nodule noted in the isthmus.  NIH Stroke scale: 0  continue  Aspirin, atorvastatin 80mg   Will hold plavix till further recommendations from neurology   Permissive HTN for 24 hours  TSH, A1C ad lipid profile all within normal limits   f/u echo with Bubble study   Will likely need MRI to r/o stroke  Admit to tele - d/naya   Neurology Dr. Roblero consulted

## 2022-01-19 NOTE — PROGRESS NOTE ADULT - ASSESSMENT
76 y/o male with PMHx of CAD s/p 5 stents, DVT, HTN, BPH, carotid stenosis, dysequilibrium s/p ACDF C5-C7, PARTIAL CORPECTOMY C6 presents with the complaint of temperature difference between the right and left side of the body. Admitted for stroke r/o  76 y/o male with PMHx of CAD s/p 5 stents, DVT, HTN, BPH, carotid stenosis, dysequilibrium s/p ACDF C5-C7, PARTIAL CORPECTOMY C6 presents with the complaint of temperature difference between the right and left side of the body. Admitted for stroke r/o. Code stoke in ED, CT for stroke w/u all negative, pending neuro eval for possible MRI  Pt reports feeling better, improving now, no new symptoms noted also on exam

## 2022-01-19 NOTE — ED ADULT NURSE REASSESSMENT NOTE - NS ED NURSE REASSESS COMMENT FT1
patient endorsement given to Rn at holding/ transport to area
Patient alert and oriented x 3 presented from main ed for alterations in body temperature. As per patient my  left side of body normal but my  right side is so hot it feels like  there is a heater next to my bed. Patient denies numbness but reports tingling sensation to hands and toes. No limitations noted to extremities. Patient assisted as needed. Patient denies any pain or discomfort at this time. Will continue to follow up with plan of care.

## 2022-01-19 NOTE — PROGRESS NOTE ADULT - SUBJECTIVE AND OBJECTIVE BOX
NP Note discussed with  Primary Attending    Patient is a 77y old  Male who presents with a chief complaint of Stroke (18 Jan 2022 22:39)      INTERVAL HPI/OVERNIGHT EVENTS: no new complaints    MEDICATIONS  (STANDING):  ascorbic acid 500 milliGRAM(s) Oral daily  aspirin enteric coated 81 milliGRAM(s) Oral daily  atorvastatin 80 milliGRAM(s) Oral at bedtime  calcium carbonate    500 mG (Tums) Chewable 2 Tablet(s) Chew daily  celecoxib 200 milliGRAM(s) Oral daily  cholecalciferol 2000 Unit(s) Oral daily  cycloSPORINE  (SandIMMUNE) 100 milliGRAM(s) Oral daily  finasteride 5 milliGRAM(s) Oral daily  folic acid 1 milliGRAM(s) Oral daily  latanoprost 0.005% Ophthalmic Solution 1 Drop(s) Both EYES at bedtime  polyethylene glycol 3350 17 Gram(s) Oral daily  rivaroxaban 10 milliGRAM(s) Oral with dinner    MEDICATIONS  (PRN):      __________________________________________________  REVIEW OF SYSTEMS:    CONSTITUTIONAL: No fever,   EYES: no acute visual disturbances  NECK: No pain or stiffness  RESPIRATORY: No cough; No shortness of breath  CARDIOVASCULAR: No chest pain, no palpitations  GASTROINTESTINAL: No pain. No nausea or vomiting; No diarrhea   NEUROLOGICAL: No headache or numbness, no tremors  MUSCULOSKELETAL: No joint pain, no muscle pain  GENITOURINARY: no dysuria, no frequency, no hesitancy  PSYCHIATRY: no depression , no anxiety  ALL OTHER  ROS negative        Vital Signs Last 24 Hrs  T(C): 36.3 (19 Jan 2022 08:58), Max: 36.9 (18 Jan 2022 20:57)  T(F): 97.4 (19 Jan 2022 08:58), Max: 98.5 (18 Jan 2022 20:57)  HR: 50 (19 Jan 2022 08:58) (48 - 77)  BP: 149/76 (19 Jan 2022 08:58) (117/68 - 149/76)  BP(mean): --  RR: 18 (19 Jan 2022 08:58) (18 - 18)  SpO2: 97% (19 Jan 2022 08:58) (96% - 99%)    ________________________________________________  PHYSICAL EXAM:  GENERAL: NAD  HEENT: Normocephalic;  conjunctivae and sclerae clear; moist mucous membranes;   NECK : supple  CHEST/LUNG: Clear to auscultation bilaterally with good air entry   HEART: S1 S2  regular; no murmurs, gallops or rubs  ABDOMEN: Soft, Nontender, Nondistended; Bowel sounds present  EXTREMITIES: no cyanosis; no edema; no calf tenderness  SKIN: warm and dry; no rash  NERVOUS SYSTEM:  Awake and alert; Oriented  to place, person and time ; no new deficits    _________________________________________________  LABS:                        12.3   3.50  )-----------( 167      ( 19 Jan 2022 06:52 )             37.0     01-19    140  |  107  |  28<H>  ----------------------------<  92  3.8   |  28  |  0.90    Ca    9.1      19 Jan 2022 06:52  Phos  3.0     01-19  Mg     2.0     01-19    TPro  5.8<L>  /  Alb  2.7<L>  /  TBili  0.4  /  DBili  x   /  AST  12  /  ALT  18  /  AlkPhos  53  01-19    PT/INR - ( 18 Jan 2022 17:50 )   PT: 15.7 sec;   INR: 1.34 ratio         PTT - ( 18 Jan 2022 17:50 )  PTT:52.7 sec    CAPILLARY BLOOD GLUCOSE      POCT Blood Glucose.: 113 mg/dL (18 Jan 2022 16:56)        RADIOLOGY & ADDITIONAL TESTS:    Imaging Personally Reviewed:  YES/NO    Consultant(s) Notes Reviewed:   YES/ No    Care Discussed with Consultants :     Plan of care was discussed with patient and /or primary care giver; all questions and concerns were addressed and care was aligned with patient's wishes.     NP Note discussed with  Primary Attending    Patient is a 77y old  Male who presents with a chief complaint of Stroke (18 Jan 2022 22:39)      INTERVAL HPI/OVERNIGHT EVENTS: no new complaints    MEDICATIONS  (STANDING):  ascorbic acid 500 milliGRAM(s) Oral daily  aspirin enteric coated 81 milliGRAM(s) Oral daily  atorvastatin 80 milliGRAM(s) Oral at bedtime  calcium carbonate    500 mG (Tums) Chewable 2 Tablet(s) Chew daily  celecoxib 200 milliGRAM(s) Oral daily  cholecalciferol 2000 Unit(s) Oral daily  cycloSPORINE  (SandIMMUNE) 100 milliGRAM(s) Oral daily  finasteride 5 milliGRAM(s) Oral daily  folic acid 1 milliGRAM(s) Oral daily  latanoprost 0.005% Ophthalmic Solution 1 Drop(s) Both EYES at bedtime  polyethylene glycol 3350 17 Gram(s) Oral daily  rivaroxaban 10 milliGRAM(s) Oral with dinner    MEDICATIONS  (PRN):      __________________________________________________  REVIEW OF SYSTEMS:    CONSTITUTIONAL: No fever,  hot sensation to Lt sided from head to toe but less than yesterday   EYES: no acute visual disturbances  NECK: No pain or stiffness  RESPIRATORY: No cough; No shortness of breath  CARDIOVASCULAR: No chest pain, no palpitations  GASTROINTESTINAL: No pain. No nausea or vomiting; No diarrhea   NEUROLOGICAL: No headache or numbness, no tremors  MUSCULOSKELETAL: No joint pain, no muscle pain, no weakness   GENITOURINARY: no dysuria, no frequency, no hesitancy  PSYCHIATRY: no depression , no anxiety  ALL OTHER  ROS negative        Vital Signs Last 24 Hrs  T(C): 36.3 (19 Jan 2022 08:58), Max: 36.9 (18 Jan 2022 20:57)  T(F): 97.4 (19 Jan 2022 08:58), Max: 98.5 (18 Jan 2022 20:57)  HR: 50 (19 Jan 2022 08:58) (48 - 77)  BP: 149/76 (19 Jan 2022 08:58) (117/68 - 149/76)  BP(mean): --  RR: 18 (19 Jan 2022 08:58) (18 - 18)  SpO2: 97% (19 Jan 2022 08:58) (96% - 99%)    ________________________________________________  PHYSICAL EXAM:  GENERAL: NAD  HEENT: Normocephalic;  conjunctivae and sclerae clear; moist mucous membranes;   NECK : supple  CHEST/LUNG: Clear to auscultation bilaterally with good air entry   HEART: S1 S2  regular; no murmurs, gallops or rubs  ABDOMEN: Soft, Nontender, Nondistended; Bowel sounds present  EXTREMITIES: no cyanosis; no edema; no calf tenderness  SKIN: warm and dry; no rash  NERVOUS SYSTEM:  Awake and alert; Oriented  to place, person and time ; no new deficits    _________________________________________________  LABS:                        12.3   3.50  )-----------( 167      ( 19 Jan 2022 06:52 )             37.0     01-19    140  |  107  |  28<H>  ----------------------------<  92  3.8   |  28  |  0.90    Ca    9.1      19 Jan 2022 06:52  Phos  3.0     01-19  Mg     2.0     01-19    TPro  5.8<L>  /  Alb  2.7<L>  /  TBili  0.4  /  DBili  x   /  AST  12  /  ALT  18  /  AlkPhos  53  01-19    PT/INR - ( 18 Jan 2022 17:50 )   PT: 15.7 sec;   INR: 1.34 ratio         PTT - ( 18 Jan 2022 17:50 )  PTT:52.7 sec    CAPILLARY BLOOD GLUCOSE      POCT Blood Glucose.: 113 mg/dL (18 Jan 2022 16:56)        RADIOLOGY & ADDITIONAL TESTS:  < from: CT Angio Neck w/ IV Cont (01.18.22 @ 17:28) >    ACC: [id number] EXAM:  CT PERFUSION W MAPS IC                        ACC: [id number] EXAM:  CT ANGIO BRAIN (W)AW IC                        ACC: [id number] EXAM:  CT BRAIN STROKE PROTOCOL                        ACC: [id number] EXAM:  CT ANGIO NECK (W)AW IC                        PROCEDURE DATE:  01/18/2022          INTERPRETATION:  CLINICAL STATEMENT: CVA    TECHNIQUE: CTA brain and neck. CT perfusion: After the administration of   50  cc of Omnipaque 300 serial thin sections were obtained through thebrain   the  purposes of evaluating CT perfusion. Raw data was sent to the ischemia   rapid  view software for postprocessing. 90 cc Omnipaque 350 Intravenous contrast  was administered for the CTA compartment. 2-D MIP and 3-D volume rendering  images.    COMPARISON: None.    FINDINGS:  CTA of the neck:      The common carotid and internal carotid arteries are patent. Calcified   atherosclerotic plaques carotid bulbs noted.    The extracranial vertebral arteries are patent. Dominant right vertebral   artery    Thyroid nodule noted in the isthmus. Multilevel degenerative changes   noted in the cervical spine. Anterior cervical fusion also noted.    CTA Head:  Calcified atherosclerotic plaques at the cavernous and supraclinoid   internal carotid arteries resulting in narrowing of bilateral   supraclinoid internal carotid arteries.    The proximal anterior, middle and posterior cerebral arteries are patent.   Hypoplastic A1 segment right anterior cerebral artery Mild narrowing in   the proximal left posterior cerebral artery. Fetal origin right posterior   cerebral artery.    The intracranial vertebral and basilar arteries are patent. Distal left   vertebral artery terminates as the posterior inferior cerebellar artery.   Mild narrowing at the V4 segment of distal right vertebral artery due to   calcified atherosclerotic plaque    There is no intracranial aneurysm.    CT Head:  There is mild diffuse parenchymal volume loss. There are areas of low   attenuation in the periventricular white matter likely related to mild   chronic microvascular ischemic changes.    There is no acute intracranial hemorrhage, parenchymal mass, mass effect   or midline shift. There is no acute territorial infarct. There is no   hydrocephalus. Partial empty sella    The cranium is intact. Mucosal thickening paranasal sinuses.    CT perfusion:  No core infarct identified.    Elevated Tmax noted bilateral frontal and parietal lobes likely   artifactual.    CBF<30% volume: 0 ml  Tmax>6.0 s volume: 114 ml  Mismatch volume: 114 ml  Mismatch ratio: Infinite    IMPRESSION:     No acute intracranial hemorrhage or acute territorial infarct. If acute   ischemia is a strong clinical concern, MRI exam is recommended for   further evaluation if there is no contraindication. Noncontrast head CT   report notified to Dr. Mcdaniel on 1/18/2022 at 5:32 PM      CT perfusion nondiagnostic due to artifacts.    No hemodynamically significant stenosis in the neck.    Intracranial narrowing as described above    --- End of Report ---            MARLENE HERRERA MD; Attending Radiologist  This document has been electronically signed. Jan 18 2022  5:57PM    < end of copied text >    Imaging Personally Reviewed:  YES    Consultant(s) Notes Reviewed:   YES    Care Discussed with Consultants : neurology     Plan of care was discussed with patient and /or primary care giver; all questions and concerns were addressed and care was aligned with patient's wishes.

## 2022-01-20 ENCOUNTER — TRANSCRIPTION ENCOUNTER (OUTPATIENT)
Age: 77
End: 2022-01-20

## 2022-01-20 LAB
ALBUMIN SERPL ELPH-MCNC: 2.8 G/DL — LOW (ref 3.5–5)
ALP SERPL-CCNC: 54 U/L — SIGNIFICANT CHANGE UP (ref 40–120)
ALT FLD-CCNC: 19 U/L DA — SIGNIFICANT CHANGE UP (ref 10–60)
ANION GAP SERPL CALC-SCNC: 3 MMOL/L — LOW (ref 5–17)
AST SERPL-CCNC: 13 U/L — SIGNIFICANT CHANGE UP (ref 10–40)
BILIRUB SERPL-MCNC: 0.6 MG/DL — SIGNIFICANT CHANGE UP (ref 0.2–1.2)
BUN SERPL-MCNC: 23 MG/DL — HIGH (ref 7–18)
CALCIUM SERPL-MCNC: 9 MG/DL — SIGNIFICANT CHANGE UP (ref 8.4–10.5)
CHLORIDE SERPL-SCNC: 109 MMOL/L — HIGH (ref 96–108)
CO2 SERPL-SCNC: 28 MMOL/L — SIGNIFICANT CHANGE UP (ref 22–31)
CREAT SERPL-MCNC: 0.92 MG/DL — SIGNIFICANT CHANGE UP (ref 0.5–1.3)
GLUCOSE SERPL-MCNC: 87 MG/DL — SIGNIFICANT CHANGE UP (ref 70–99)
HCT VFR BLD CALC: 37.8 % — LOW (ref 39–50)
HGB BLD-MCNC: 12.7 G/DL — LOW (ref 13–17)
MCHC RBC-ENTMCNC: 32.1 PG — SIGNIFICANT CHANGE UP (ref 27–34)
MCHC RBC-ENTMCNC: 33.6 GM/DL — SIGNIFICANT CHANGE UP (ref 32–36)
MCV RBC AUTO: 95.5 FL — SIGNIFICANT CHANGE UP (ref 80–100)
NRBC # BLD: 0 /100 WBCS — SIGNIFICANT CHANGE UP (ref 0–0)
PLATELET # BLD AUTO: 171 K/UL — SIGNIFICANT CHANGE UP (ref 150–400)
POTASSIUM SERPL-MCNC: 4.2 MMOL/L — SIGNIFICANT CHANGE UP (ref 3.5–5.3)
POTASSIUM SERPL-SCNC: 4.2 MMOL/L — SIGNIFICANT CHANGE UP (ref 3.5–5.3)
PROT SERPL-MCNC: 5.8 G/DL — LOW (ref 6–8.3)
RBC # BLD: 3.96 M/UL — LOW (ref 4.2–5.8)
RBC # FLD: 12.4 % — SIGNIFICANT CHANGE UP (ref 10.3–14.5)
SODIUM SERPL-SCNC: 140 MMOL/L — SIGNIFICANT CHANGE UP (ref 135–145)
TSH SERPL-MCNC: 3.25 UU/ML — SIGNIFICANT CHANGE UP (ref 0.34–4.82)
WBC # BLD: 5.43 K/UL — SIGNIFICANT CHANGE UP (ref 3.8–10.5)
WBC # FLD AUTO: 5.43 K/UL — SIGNIFICANT CHANGE UP (ref 3.8–10.5)

## 2022-01-20 RX ORDER — CLOPIDOGREL BISULFATE 75 MG/1
75 TABLET, FILM COATED ORAL DAILY
Refills: 0 | Status: DISCONTINUED | OUTPATIENT
Start: 2022-01-20 | End: 2022-01-21

## 2022-01-20 RX ORDER — ATORVASTATIN CALCIUM 80 MG/1
40 TABLET, FILM COATED ORAL AT BEDTIME
Refills: 0 | Status: DISCONTINUED | OUTPATIENT
Start: 2022-01-20 | End: 2022-01-21

## 2022-01-20 RX ORDER — LISINOPRIL 2.5 MG/1
5 TABLET ORAL DAILY
Refills: 0 | Status: DISCONTINUED | OUTPATIENT
Start: 2022-01-20 | End: 2022-01-21

## 2022-01-20 RX ORDER — OXYBUTYNIN CHLORIDE 5 MG
10 TABLET ORAL
Refills: 0 | Status: DISCONTINUED | OUTPATIENT
Start: 2022-01-20 | End: 2022-01-21

## 2022-01-20 RX ADMIN — PANTOPRAZOLE SODIUM 40 MILLIGRAM(S): 20 TABLET, DELAYED RELEASE ORAL at 05:52

## 2022-01-20 RX ADMIN — Medication 500 MILLIGRAM(S): at 12:35

## 2022-01-20 RX ADMIN — POLYETHYLENE GLYCOL 3350 17 GRAM(S): 17 POWDER, FOR SOLUTION ORAL at 12:37

## 2022-01-20 RX ADMIN — Medication 2 TABLET(S): at 12:34

## 2022-01-20 RX ADMIN — CELECOXIB 200 MILLIGRAM(S): 200 CAPSULE ORAL at 12:35

## 2022-01-20 RX ADMIN — LATANOPROST 1 DROP(S): 0.05 SOLUTION/ DROPS OPHTHALMIC; TOPICAL at 21:38

## 2022-01-20 RX ADMIN — CYCLOSPORINE 100 MILLIGRAM(S): 100 CAPSULE ORAL at 12:34

## 2022-01-20 RX ADMIN — RIVAROXABAN 10 MILLIGRAM(S): KIT at 17:28

## 2022-01-20 RX ADMIN — LISINOPRIL 5 MILLIGRAM(S): 2.5 TABLET ORAL at 17:30

## 2022-01-20 RX ADMIN — FINASTERIDE 5 MILLIGRAM(S): 5 TABLET, FILM COATED ORAL at 12:34

## 2022-01-20 RX ADMIN — Medication 10 MILLIGRAM(S): at 17:28

## 2022-01-20 RX ADMIN — Medication 2000 UNIT(S): at 12:34

## 2022-01-20 RX ADMIN — Medication 1 MILLIGRAM(S): at 12:35

## 2022-01-20 NOTE — PHYSICAL THERAPY INITIAL EVALUATION ADULT - GENERAL OBSERVATIONS, REHAB EVAL
Pt. received in supine; AxOX3, (+) IV access on  (L) arm; reports inc. warmth sensation on (R) side of body.

## 2022-01-20 NOTE — DISCHARGE NOTE NURSING/CASE MANAGEMENT/SOCIAL WORK - PATIENT PORTAL LINK FT
You can access the FollowMyHealth Patient Portal offered by [facility] by registering at the following website: [email or web address]. By joining [facility]’s FollowMyHealth portal, you will also be able to view your health information using other applications (apps) compatible with our system.

## 2022-01-20 NOTE — PROGRESS NOTE ADULT - PROBLEM SELECTOR PLAN 1
TIA vs Stroke vs Neuropathy vs hyperthyroidism  Code stroke initiated in ED but out of window for tPA  CT Head: No acute intracranial hemorrhage or acute territorial infarct.  CT perfusion: nondiagnostic due to artifacts.  No hemodynamically significant stenosis in the neck.  CT neck: Thyroid nodule noted in the isthmus.  see MRI as above  NIH Stroke scale: 0  continue  Aspirin, atorvastatin, plavix   TSH, A1C ad lipid profile all within normal limits   f/u echo with Bubble study   pending speech and swallow  Neurology Dr. Roblero

## 2022-01-20 NOTE — DISCHARGE NOTE NURSING/CASE MANAGEMENT/SOCIAL WORK - NSDCPEPTSTROKESIGNS_GEN_ALL_CORE
Date Seen


The patient was seen on 5/8/17.





Progress Note


SUBJECTIVE: Mr. Rosales is a 54-year-old  male who is examined at 

bedside this morning. He is alert and conversant. He is able to follow my 

commands and answer appropriately. He continues to report persistent numbness 

and tingling on the left side. Positive pronator drift in the upper and lower 

extremity. Patient states he is not aware of any history of hypertension. Is a 

current tobacco user. Smokes approximately 2 packs a day and has done so since 

the age of 13. No family history of stroke. No facial droop or tongue 

deviation. Denies chest pain. Patient states that symptoms started a week ago 

on Saturday and reported that his balance was off and that he had some blurry 

vision. He states that the symptoms subsided but then returned and persisted 

yesterday. This was the reason for presenting to the emergency department. 





OBJECTIVE


PHYSICAL EXAMINATION:


VITAL SIGNS: Please see below. 


GENERAL: Well-developed, well-nourished  male sitting comfortably in 

hospital bed upon evaluation this morning, no apparent distress


HEENT: Atraumatic, normocephalic, no facial droop present, no tongue deviation, 

altered sensation noted on the left side


CARDIOVASCULAR: Regular rate and rhythm, normal S1 and S2, no murmur, rub, click


RESPIRATORY: Clear to auscultation bilaterally, adequate inspiratory and 

expiratory airway excursion, no wheeze, rhonchi, crackles


ABDOMINAL: Flat, soft, nontender, nondistended


EXTREMITIES: Pronator drift present in the left upper and lower extremity, 

peripheral pulses appreciated bilaterally, equal, symmetrical, +2/4, diminished 

reflexes noted on the left, normal reflexes on the right


NEUROLOGICAL: Numbness and tingling noted on the left side


PSYCHOLOGICAL: Mood and affect appear appropriate





LABORATORY DATA: Please see below.





MICROBIOLOGY: Please see below.





IMAGING:


Bilateral carotid duplex


Impression:


No hemodynamically significant areas of narrowing or stenosis appreciated.


Based on set standards narrowing falls within the less than 50% range.





MRA of the brain without contrast


Impression: 


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 

flow seen in the right superior cerebellar artery.





MRI of the brain without contrast


Impression: 


Acute infarct in the posterior right haider.





Echocardiogram: Pending





DVT prophylaxis ordered?: Lovenox 40 mg subcutaneous daily





ASSESSMENT AND PLAN: Mr. Rosales is a 54-year-old  male who presents 

with cerebrovascular accident. 





PROBLEMS:


1. Cerebrovascular accident, basilar artery: Imaging reports above that 

indicate acute infarction to the basilar artery. Carotid ultrasound was 

negative. Maintaining permissive hypertension. Patient's blood pressure is 

currently 178/85. Neurology has been consulted. Patient continues to have 

persistent altered sensation to the left side. Receiving aspirin 325 mg daily, 

hydralazine 10 mg every 6 hours, and Lipitor 40 mg at night. Various studies 

are being conducted to evaluate for potential cause of stroke.





2. Chronic pain: Patient does take [place] outpatient. We'll hold this medication 

for the time being.





3. Allergies: Patient takes diphenhydramine for allergies.





DISPOSITION: Patient continues to have persistent altered sensation to the left 

side. Imaging reveals acute infarction to the basilar artery. Appreciate 

neurology's consultation. Continue with current therapy. Order physical therapy

, occupational therapy, and speech therapy. Patient reports difficulty 

swallowing secondary to numbness on the left side. Aspiration precautions.





VS, I&O, 24H, Fishbone


Vital Signs/I&O





Vital Signs








  Date Time  Temp Pulse Resp B/P (MAP) Pulse Ox O2 Delivery O2 Flow Rate FiO2


 


5/8/17 08:00    174/82    


 


5/8/17 08:00 98.1 85 18  95 Room Air  














I&O- Last 24 Hours up to 6 AM 


 


 5/8/17





 06:00


 


Intake Total 120 ml


 


Output Total 550 ml


 


Balance -430 ml











Laboratory Data


24H LABS


Laboratory Tests 2


5/7/17 21:04: 


Estimated Mean Plasma Glucose 255H, Hemoglobin A1c 10.5H


5/7/17 21:09: 


Anion Gap 7L, Glomerular Filtration Rate > 60.0, Blood Urea Nitrogen 9, 

Creatinine 0.93, Sodium Level 137, Potassium Level 3.7, Chloride Level 103, 

Carbon Dioxide Level 27, Calcium Level 8.6, Triglycerides Level 192H, LDL 

Cholesterol 83.6, Total Cholesterol 159, Non-HDL Cholesterol (LDL + VLDL) 122, 

Total HDL Cholesterol 37L, Cholesterol/HDL Ratio 4.297


5/8/17 04:55: 


Erythrocyte Sedimentation Rate 2, C-Reactive Protein, Quantitative < 0.30


5/8/17 06:17: Bedside Glucose (Misc Panel) 209H


CBC/BMP


Laboratory Tests


5/7/17 21:09








Red Blood Count 5.78, Mean Corpuscular Volume 90.7, Mean Corpuscular Hemoglobin 

31.2, Mean Corpuscular Hemoglobin Concent 34.4, Red Cell Distribution Width 13.6

, Calcium Level 8.6











DURGA MCFARLAND-KEYANNA May 8, 2017 09:44 Sudden numbness or weakness of the face, arm, or leg, especially on one side of the body. Confusion, trouble speaking or understanding. Trouble seeing in one or both eyes. Trouble walking, dizziness, loss of balance or coordination. Severe headache.

## 2022-01-20 NOTE — DISCHARGE NOTE NURSING/CASE MANAGEMENT/SOCIAL WORK - NSDCVIVACCINE_GEN_ALL_CORE_FT
DTaP; 22-Jun-2015 07:25; Nain Everett (ELIJAH); [employer]; [id number]; IntraMuscular; Deltoid Right.; 0.5 milliLiter(s); VIS (VIS Published: 09-May-2013, VIS Presented: 22-Jun-2015);

## 2022-01-20 NOTE — PROGRESS NOTE ADULT - ASSESSMENT
Patient is a 77 year old male from home with PMHx of CAD s/p 5 stents, DVT, HTN, BPH, carotid stenosis, dysequilibrium s/p ACDF C5-C7, PARTIAL CORPECTOMY C6. Patient presented with the complaint of temperature difference between the right and left side of the body. Patient admitted to telemetry for stroke work up. Code stoke in ED, CT head for negative for brain hemorrhage. CT perfusion: nondiagnostic due to artifacts. MRI brain resulting small acute and/or subacute lacunar infarct to the left thalamus. Chronic ischemic changes are also noted in both hemispheres with volume loss and involutional change. No enhancing brain lesion identified; inflammatory changes are noted in the paranasal sinuses and right mastoid cells. Neurology consulted. Patient pending ECHO.

## 2022-01-20 NOTE — PHYSICAL THERAPY INITIAL EVALUATION ADULT - ADDITIONAL COMMENTS
Pt. states has RW, shower seat at home. Rarely uses RW due to narrow space in the apartment.  Pt. instructed to use AD at all time for safely.

## 2022-01-20 NOTE — PROGRESS NOTE ADULT - SUBJECTIVE AND OBJECTIVE BOX
NP Note discussed with  Primary Attending    Patient is a 77y old  Male who presents with a chief complaint of Stroke (20 Jan 2022 11:47)      INTERVAL HPI/OVERNIGHT EVENTS: Patient seen and examined at bedside, no overnight events.     MEDICATIONS  (STANDING):  ascorbic acid 500 milliGRAM(s) Oral daily  atorvastatin 40 milliGRAM(s) Oral at bedtime  calcium carbonate    500 mG (Tums) Chewable 2 Tablet(s) Chew daily  celecoxib 200 milliGRAM(s) Oral daily  cholecalciferol 2000 Unit(s) Oral daily  clopidogrel Tablet 75 milliGRAM(s) Oral daily  cycloSPORINE  (SandIMMUNE) 100 milliGRAM(s) Oral daily  finasteride 5 milliGRAM(s) Oral daily  folic acid 1 milliGRAM(s) Oral daily  latanoprost 0.005% Ophthalmic Solution 1 Drop(s) Both EYES at bedtime  pantoprazole    Tablet 40 milliGRAM(s) Oral before breakfast  polyethylene glycol 3350 17 Gram(s) Oral daily  rivaroxaban 10 milliGRAM(s) Oral with dinner    MEDICATIONS  (PRN):      __________________________________________________  REVIEW OF SYSTEMS:    CONSTITUTIONAL: No fever,   EYES: no acute visual disturbances  NECK: No pain or stiffness  RESPIRATORY: No cough; No shortness of breath  CARDIOVASCULAR: No chest pain, no palpitations  GASTROINTESTINAL: No pain. No nausea or vomiting; No diarrhea   NEUROLOGICAL:  left side warm sensation than right, No headache or numbness, no tremors  MUSCULOSKELETAL: No joint pain, no muscle pain  GENITOURINARY: no dysuria, no frequency, no hesitancy  PSYCHIATRY: no depression , no anxiety  ALL OTHER  ROS negative        Vital Signs Last 24 Hrs  T(C): 36.4 (20 Jan 2022 04:54), Max: 36.8 (19 Jan 2022 20:42)  T(F): 97.6 (20 Jan 2022 04:54), Max: 98.3 (19 Jan 2022 20:42)  HR: 55 (20 Jan 2022 10:39) (50 - 58)  BP: 134/80 (20 Jan 2022 10:39) (134/80 - 158/88)  BP(mean): --  RR: 17 (20 Jan 2022 04:54) (17 - 18)  SpO2: 94% (20 Jan 2022 10:39) (94% - 97%)    ________________________________________________  PHYSICAL EXAM:  GENERAL: NAD  HEENT: Normocephalic;  conjunctivae and sclerae clear; moist mucous membranes;   NECK : supple  CHEST/LUNG: Clear to auscultation bilaterally with good air entry   HEART: S1 S2  regular; no murmurs, gallops or rubs  ABDOMEN: Soft, Nontender, Nondistended; Bowel sounds present  EXTREMITIES: weakness, no cyanosis; no edema; no calf tenderness  SKIN: warm and dry; no rash  NERVOUS SYSTEM:  Awake and alert; Oriented  to place, person and time ; no new deficits    _________________________________________________  LABS:                        12.7   5.43  )-----------( 171      ( 20 Jan 2022 07:04 )             37.8     01-20    140  |  109<H>  |  23<H>  ----------------------------<  87  4.2   |  28  |  0.92    Ca    9.0      20 Jan 2022 07:04  Phos  3.0     01-19  Mg     2.0     01-19    TPro  5.8<L>  /  Alb  2.8<L>  /  TBili  0.6  /  DBili  x   /  AST  13  /  ALT  19  /  AlkPhos  54  01-20    PT/INR - ( 18 Jan 2022 17:50 )   PT: 15.7 sec;   INR: 1.34 ratio         PTT - ( 18 Jan 2022 17:50 )  PTT:52.7 sec    CAPILLARY BLOOD GLUCOSE    RADIOLOGY & ADDITIONAL TESTS:  < from: MR Head w/wo IV Cont (01.19.22 @ 19:35) >    ACC: [id number] EXAM:  MR BRAIN WAW IC                          PROCEDURE DATE:  01/19/2022          INTERPRETATION:  INDICATION:    Sensory disturbance.  TECHNIQUE:  Multiplanar brain imaging was conducted. T1, T2 and FLAIR   imaging was performed.In addition, diffusion imaging, diffusion   coefficient assessment (ADC) and T2* was incorporated . Post contrast   imaging was performed. 7.5 cc Gadavist was administered.  COMPARISON EXAMINATION:  CT brain 1/18/2022    FINDINGS:    HEMISPHERES: An acute or subacute lacunar infarct is noted in the left   thalamus, with a small ovoid area of diffusion restriction.. Also noted   are chronic ischemic changes in both hemispheres with volume loss. There   is Virchow-Tyson space or dilatation in the basal ganglia, and to a   lesser extent, in the subcortical white matter. No microhemorrhage or   mass lesion is noted. With contrast administration, no enhancing brain   lesion is noted.  VENTRICLES:  midline and normal in size  POSTERIOR FOSSA:  No acute abnormality noted.  EXTRA-AXIAL:  No mass or collections are depicted.  VASCULATURE:  The major vessels and venous sinuses are grossly patent.  SINUSES AND MASTOIDS:  Mucosal thickening noted in the sinuses. Right   mastoid partial opacification is noted as well.  MISCELLANEOUS:  Degenerative changes are noted in the cervical spine and   there are artifacts suggesting prior ACDF in the mid cervical region.   This is consistent with the CT appearance and 5/7/2021.    IMPRESSION:    1)  A smallacute and/or subacute lacunar infarct is noted in the left   thalamus. Chronic ischemic changes are also noted in both hemispheres   with volume loss and involutional change. No enhancing brain lesion   identified. See above.  2)  inflammatory changes are noted in the paranasal sinuses and right   mastoid cells.    --- End of Report ---    < end of copied text >  < from: CT Angio Neck w/ IV Cont (01.18.22 @ 17:28) >    ACC: [id number] EXAM:  CT PERFUSION W MAPS IC                        ACC: [id number] EXAM:  CT ANGIO BRAIN (W)AW IC                        ACC: [id number] EXAM:  CT BRAIN STROKE PROTOCOL                        ACC: [id number] EXAM:  CT ANGIO NECK (W)AW IC                        PROCEDURE DATE:  01/18/2022          INTERPRETATION:  CLINICAL STATEMENT: CVA    TECHNIQUE: CTA brain and neck. CT perfusion: After the administration of   50  cc of Omnipaque 300 serial thin sections were obtained through thebrain   the  purposes of evaluating CT perfusion. Raw data was sent to the ischemia   rapid  view software for postprocessing. 90 cc Omnipaque 350 Intravenous contrast  was administered for the CTA compartment. 2-D MIP and 3-D volume rendering  images.    COMPARISON: None.    FINDINGS:  CTA of the neck:      The common carotid and internal carotid arteries are patent. Calcified   atherosclerotic plaques carotid bulbs noted.    The extracranial vertebral arteries are patent. Dominant right vertebral   artery    Thyroid nodule noted in the isthmus. Multilevel degenerative changes   noted in the cervical spine. Anterior cervical fusion also noted.    CTA Head:  Calcified atherosclerotic plaques at the cavernous and supraclinoid   internal carotid arteries resulting in narrowing of bilateral   supraclinoid internal carotid arteries.    The proximal anterior, middle and posterior cerebral arteries are patent.   Hypoplastic A1 segment right anterior cerebral artery Mild narrowing in   the proximal left posterior cerebral artery. Fetal origin right posterior   cerebral artery.    The intracranial vertebral and basilar arteries are patent. Distal left   vertebral artery terminates as the posterior inferior cerebellar artery.   Mild narrowing at the V4 segment of distal right vertebral artery due to   calcified atherosclerotic plaque    There is no intracranial aneurysm.    CT Head:  There is mild diffuse parenchymal volume loss. There are areas of low   attenuation in the periventricular white matter likely related to mild   chronic microvascular ischemic changes.    There is no acute intracranial hemorrhage, parenchymal mass, mass effect   or midline shift. There is no acute territorial infarct. There is no   hydrocephalus. Partial empty sella    The cranium is intact. Mucosal thickening paranasal sinuses.    CT perfusion:  No core infarct identified.    Elevated Tmax noted bilateral frontal and parietal lobes likely   artifactual.    CBF<30% volume: 0 ml  Tmax>6.0 s volume: 114 ml  Mismatch volume: 114 ml  Mismatch ratio: Infinite    IMPRESSION:     No acute intracranial hemorrhage or acute territorial infarct. If acute   ischemia is a strong clinical concern, MRI exam is recommended for   further evaluation if there is no contraindication. Noncontrast head CT   report notified to Dr. Mcdaniel on 1/18/2022 at 5:32 PM      CT perfusion nondiagnostic due to artifacts.    No hemodynamically significant stenosis in the neck.    Intracranial narrowing as described above    --- End of Report ---      < end of copied text >    Imaging  Reviewed:  YES  Consultant(s) Notes Reviewed:   YES      Plan of care was discussed with patient and /or primary care giver; all questions and concerns were addressed

## 2022-01-20 NOTE — PROGRESS NOTE ADULT - ASSESSMENT
78 y/o male with PMHx of CAD s/p 5 stents, DVT, HTN, BPH, carotid stenosis, dysequilibrium s/p ACDF C5-C7, PARTIAL CORPECTOMY C6 presents with the complaint of temperature difference between the right and left side of the body,Lt lacunar infarct.  1.Neurology f/u-d/w -plavix,statin,no asa since on xarelto.  2.CAD-cont cardiac medication.  3.HTN-cont bp medication.  4.Lipid d/o-statin.  5.GI and DVT prophylaxis.  6.PT.

## 2022-01-20 NOTE — DISCHARGE NOTE NURSING/CASE MANAGEMENT/SOCIAL WORK - NSDCPEFALRISK_GEN_ALL_CORE
For information on Fall & Injury Prevention, visit: [email or web address] OR  [email or web address] OR  https://www.cdc.gov/steadi/patient.html

## 2022-01-21 ENCOUNTER — TRANSCRIPTION ENCOUNTER (OUTPATIENT)
Age: 77
End: 2022-01-21

## 2022-01-21 VITALS
DIASTOLIC BLOOD PRESSURE: 56 MMHG | OXYGEN SATURATION: 99 % | RESPIRATION RATE: 18 BRPM | TEMPERATURE: 98 F | SYSTOLIC BLOOD PRESSURE: 113 MMHG | HEART RATE: 55 BPM

## 2022-01-21 LAB
ALBUMIN SERPL ELPH-MCNC: 3.3 G/DL — LOW (ref 3.5–5)
ALP SERPL-CCNC: 63 U/L — SIGNIFICANT CHANGE UP (ref 40–120)
ALT FLD-CCNC: 22 U/L DA — SIGNIFICANT CHANGE UP (ref 10–60)
ANION GAP SERPL CALC-SCNC: 6 MMOL/L — SIGNIFICANT CHANGE UP (ref 5–17)
AST SERPL-CCNC: 13 U/L — SIGNIFICANT CHANGE UP (ref 10–40)
BILIRUB SERPL-MCNC: 0.5 MG/DL — SIGNIFICANT CHANGE UP (ref 0.2–1.2)
BUN SERPL-MCNC: 26 MG/DL — HIGH (ref 7–18)
CALCIUM SERPL-MCNC: 9.6 MG/DL — SIGNIFICANT CHANGE UP (ref 8.4–10.5)
CHLORIDE SERPL-SCNC: 107 MMOL/L — SIGNIFICANT CHANGE UP (ref 96–108)
CO2 SERPL-SCNC: 30 MMOL/L — SIGNIFICANT CHANGE UP (ref 22–31)
CREAT SERPL-MCNC: 1.02 MG/DL — SIGNIFICANT CHANGE UP (ref 0.5–1.3)
GLUCOSE SERPL-MCNC: 109 MG/DL — HIGH (ref 70–99)
HCT VFR BLD CALC: 42.5 % — SIGNIFICANT CHANGE UP (ref 39–50)
HGB BLD-MCNC: 14 G/DL — SIGNIFICANT CHANGE UP (ref 13–17)
MCHC RBC-ENTMCNC: 31.7 PG — SIGNIFICANT CHANGE UP (ref 27–34)
MCHC RBC-ENTMCNC: 32.9 GM/DL — SIGNIFICANT CHANGE UP (ref 32–36)
MCV RBC AUTO: 96.4 FL — SIGNIFICANT CHANGE UP (ref 80–100)
NRBC # BLD: 0 /100 WBCS — SIGNIFICANT CHANGE UP (ref 0–0)
PLATELET # BLD AUTO: 195 K/UL — SIGNIFICANT CHANGE UP (ref 150–400)
POTASSIUM SERPL-MCNC: 3.8 MMOL/L — SIGNIFICANT CHANGE UP (ref 3.5–5.3)
POTASSIUM SERPL-SCNC: 3.8 MMOL/L — SIGNIFICANT CHANGE UP (ref 3.5–5.3)
PROT SERPL-MCNC: 6.4 G/DL — SIGNIFICANT CHANGE UP (ref 6–8.3)
RBC # BLD: 4.41 M/UL — SIGNIFICANT CHANGE UP (ref 4.2–5.8)
RBC # FLD: 12.4 % — SIGNIFICANT CHANGE UP (ref 10.3–14.5)
SODIUM SERPL-SCNC: 143 MMOL/L — SIGNIFICANT CHANGE UP (ref 135–145)
WBC # BLD: 4.27 K/UL — SIGNIFICANT CHANGE UP (ref 3.8–10.5)
WBC # FLD AUTO: 4.27 K/UL — SIGNIFICANT CHANGE UP (ref 3.8–10.5)

## 2022-01-21 PROCEDURE — 82962 GLUCOSE BLOOD TEST: CPT

## 2022-01-21 PROCEDURE — 70450 CT HEAD/BRAIN W/O DYE: CPT | Mod: MA

## 2022-01-21 PROCEDURE — 80053 COMPREHEN METABOLIC PANEL: CPT

## 2022-01-21 PROCEDURE — A9579: CPT

## 2022-01-21 PROCEDURE — 85730 THROMBOPLASTIN TIME PARTIAL: CPT

## 2022-01-21 PROCEDURE — 99285 EMERGENCY DEPT VISIT HI MDM: CPT | Mod: 25

## 2022-01-21 PROCEDURE — 85027 COMPLETE CBC AUTOMATED: CPT

## 2022-01-21 PROCEDURE — 83036 HEMOGLOBIN GLYCOSYLATED A1C: CPT

## 2022-01-21 PROCEDURE — 84100 ASSAY OF PHOSPHORUS: CPT

## 2022-01-21 PROCEDURE — 70496 CT ANGIOGRAPHY HEAD: CPT | Mod: MA

## 2022-01-21 PROCEDURE — 97161 PT EVAL LOW COMPLEX 20 MIN: CPT

## 2022-01-21 PROCEDURE — 93306 TTE W/DOPPLER COMPLETE: CPT

## 2022-01-21 PROCEDURE — 84484 ASSAY OF TROPONIN QUANT: CPT

## 2022-01-21 PROCEDURE — 84443 ASSAY THYROID STIM HORMONE: CPT

## 2022-01-21 PROCEDURE — 86803 HEPATITIS C AB TEST: CPT

## 2022-01-21 PROCEDURE — 80061 LIPID PANEL: CPT

## 2022-01-21 PROCEDURE — 85025 COMPLETE CBC W/AUTO DIFF WBC: CPT

## 2022-01-21 PROCEDURE — 0042T: CPT

## 2022-01-21 PROCEDURE — 83735 ASSAY OF MAGNESIUM: CPT

## 2022-01-21 PROCEDURE — 85610 PROTHROMBIN TIME: CPT

## 2022-01-21 PROCEDURE — 93005 ELECTROCARDIOGRAM TRACING: CPT

## 2022-01-21 PROCEDURE — 36415 COLL VENOUS BLD VENIPUNCTURE: CPT

## 2022-01-21 PROCEDURE — 99233 SBSQ HOSP IP/OBS HIGH 50: CPT

## 2022-01-21 PROCEDURE — 70498 CT ANGIOGRAPHY NECK: CPT | Mod: MA

## 2022-01-21 PROCEDURE — 70553 MRI BRAIN STEM W/O & W/DYE: CPT

## 2022-01-21 PROCEDURE — 87635 SARS-COV-2 COVID-19 AMP PRB: CPT

## 2022-01-21 RX ORDER — ROSUVASTATIN CALCIUM 5 MG/1
1 TABLET ORAL
Qty: 0 | Refills: 0 | DISCHARGE

## 2022-01-21 RX ORDER — ASPIRIN/CALCIUM CARB/MAGNESIUM 324 MG
81 TABLET ORAL DAILY
Refills: 0 | Status: DISCONTINUED | OUTPATIENT
Start: 2022-01-21 | End: 2022-01-21

## 2022-01-21 RX ORDER — ATORVASTATIN CALCIUM 80 MG/1
1 TABLET, FILM COATED ORAL
Qty: 30 | Refills: 0
Start: 2022-01-21 | End: 2022-02-19

## 2022-01-21 RX ORDER — ASPIRIN/CALCIUM CARB/MAGNESIUM 324 MG
1 TABLET ORAL
Qty: 30 | Refills: 0
Start: 2022-01-21 | End: 2022-02-19

## 2022-01-21 RX ADMIN — POLYETHYLENE GLYCOL 3350 17 GRAM(S): 17 POWDER, FOR SOLUTION ORAL at 11:43

## 2022-01-21 RX ADMIN — RIVAROXABAN 10 MILLIGRAM(S): KIT at 17:24

## 2022-01-21 RX ADMIN — Medication 10 MILLIGRAM(S): at 17:25

## 2022-01-21 RX ADMIN — FINASTERIDE 5 MILLIGRAM(S): 5 TABLET, FILM COATED ORAL at 11:43

## 2022-01-21 RX ADMIN — CELECOXIB 200 MILLIGRAM(S): 200 CAPSULE ORAL at 11:43

## 2022-01-21 RX ADMIN — Medication 160 MILLIGRAM(S): at 11:44

## 2022-01-21 RX ADMIN — LISINOPRIL 5 MILLIGRAM(S): 2.5 TABLET ORAL at 11:44

## 2022-01-21 RX ADMIN — Medication 81 MILLIGRAM(S): at 17:27

## 2022-01-21 RX ADMIN — Medication 2000 UNIT(S): at 11:42

## 2022-01-21 RX ADMIN — CYCLOSPORINE 100 MILLIGRAM(S): 100 CAPSULE ORAL at 11:43

## 2022-01-21 RX ADMIN — Medication 1 MILLIGRAM(S): at 11:43

## 2022-01-21 RX ADMIN — Medication 2 TABLET(S): at 11:43

## 2022-01-21 RX ADMIN — Medication 10 MILLIGRAM(S): at 11:43

## 2022-01-21 RX ADMIN — Medication 500 MILLIGRAM(S): at 11:43

## 2022-01-21 RX ADMIN — PANTOPRAZOLE SODIUM 40 MILLIGRAM(S): 20 TABLET, DELAYED RELEASE ORAL at 11:43

## 2022-01-21 RX ADMIN — CELECOXIB 200 MILLIGRAM(S): 200 CAPSULE ORAL at 12:43

## 2022-01-21 NOTE — DISCHARGE NOTE PROVIDER - HOSPITAL COURSE
77 year old male from home with PMHx of CAD s/p 5 stents, DVT, HTN, BPH, carotid stenosis, dysequilibrium s/p ACDF C5-C7, PARTIAL CORPECTOMY C6. Patient presented with the complaint of temperature difference between the right and left side of the body. Patient admitted to telemetry for stroke work up.  CT head negative for brain hemorrhage. CT perfusion: nondiagnostic due to artifacts. MRI brain resulting small acute and/or subacute lacunar infarct to the left thalamus. Chronic ischemic changes are also noted in both hemispheres with volume loss and involutional change. No enhancing brain lesion identified; inflammatory changes are noted in the paranasal sinuses and right mastoid cells. Will continue  Aspirin, atorvastatin, Plavix   Patient is a 77 year old male from home with PMHx of CAD s/p 5 stents, DVT, HTN, BPH, carotid stenosis, dysequilibrium s/p ACDF C5-C7, PARTIAL CORPECTOMY C6. Patient presented with the complaint of temperature difference between the right and left side of the body. Patient admitted to telemetry for stroke work up. Code stoke in ED, CT head for negative for brain hemorrhage. CT perfusion: nondiagnostic due to artifacts. MRI brain resulting small acute and/or subacute lacunar infarct to the left thalamus. Chronic ischemic changes are also noted in both hemispheres with volume loss and involutional change. No enhancing brain lesion identified; inflammatory changes are noted in the paranasal sinuses and right mastoid cells. Neurology consulted.  ECHO unremarkable.  Code stroke initiated in ED but out of window for tPA, started on Aspirin, atorvastatin, Plavix   CT neck: Thyroid nodule noted in the isthmus.  Speech and swallow passed, does not warrant an official speech and swallow evaluation.   PT recommending Home PT.     Please note that this a brief summary of hospital course please refer to daily progress notes and consult notes for full course and events. Patient seen and examined at bedside, discussed with medical attending. Patient medically cleared for discharge to home with PT.

## 2022-01-21 NOTE — PROGRESS NOTE ADULT - PROBLEM SELECTOR PLAN 4
On Cyclosporine at home   Takes bactrim 3 times a week prophylactically   continue cyclosporine  continue Sulfatrim- PPX for reaction to cyclosporine
On Cyclosporine at home   Takes bactrim 3 times a week prophylactically   Will continue home cyclosporine
On Cyclosporine at home   Takes bactrim 3 times a week prophylactically   continue cyclosporine  continue sulfatrim- PPX for reaction to cyclosporine

## 2022-01-21 NOTE — DISCHARGE NOTE PROVIDER - PROVIDER TOKENS
PROVIDER:[TOKEN:[[id number]],FOLLOWUP:[1 week]],PROVIDER:[TOKEN:[[id number]],FOLLOWUP:[1 week]]

## 2022-01-21 NOTE — DISCHARGE NOTE PROVIDER - NSDCHHASSISTILLNESS_GEN_ALL_CORE
Labor Note    S: patient comfortable with epidural    O  Temp:  [98.4 °F (36.9 °C)-98.8 °F (37.1 °C)]   Pulse:  [65-99]   Resp:  [16-18]   BP: (116-170)/()   SpO2:  [90 %-100 %]    Gen: A&Ox3, naD  Abd: gravid  SVE: 4cm/thick/high  FHT: 120bpm, mod kj, +accels, no decels  [place]: irregular    AP: 31 yo  female @ 39.3 wks with gHTN admitted for IOL.  FHT cat 1    GBS neg    IOL:  --s/p cytotec PO x 3  --rodriguez bulb now out  --will start pitocin now  --GBS positive: will start PCN as well    GHTN: mild range BPs now; no antihypertensive meds needed since admission    sheryl amato MD   Home PT

## 2022-01-21 NOTE — DISCHARGE NOTE PROVIDER - NSDCCPCAREPLAN_GEN_ALL_CORE_FT
PRINCIPAL DISCHARGE DIAGNOSIS  Diagnosis: Cerebrovascular accident (CVA)  Assessment and Plan of Treatment: Code stroke initiated in ED but out of window for tPA  continue  Aspirin, atorvastatin, plavix         SECONDARY DISCHARGE DIAGNOSES  Diagnosis: Deep vein thrombosis (DVT)  Assessment and Plan of Treatment: Continue xarelto.      Diagnosis: BPH (benign prostatic hyperplasia)  Assessment and Plan of Treatment: Continue oxybutin and proscar    Diagnosis: Eczema  Assessment and Plan of Treatment: On Cyclosporine at home   Takes bactrim 3 times a week prophylactically   continue cyclosporine  continue sulfatrim- PPX for reaction to cyclosporine    Diagnosis: HTN (hypertension)  Assessment and Plan of Treatment: c/w Lisinopril.       PRINCIPAL DISCHARGE DIAGNOSIS  Diagnosis: Cerebrovascular accident (CVA)  Assessment and Plan of Treatment: WHAT YOU NEED TO KNOW:  Self-care measures are ways to help yourself manage the physical, mental, and emotional effects of a stroke. The effects of a stroke depend on where the stroke happened in your brain and how much damage occurred. You will learn self-care measures during stroke rehabilitation (rehab) sessions. Rehab is a program run by specialists who will help you recover abilities you may have lost. Specialists include physical, occupational, and speech therapists. They will help you develop a plan to care for yourself at home and at work.  DISCHARGE INSTRUCTIONS:  Call your local emergency number (291 in the [place]) or have someone else call if:  You have any of the following signs of a heart attack:  Squeezing, pressure, or pain in your chest  You may also have any of the following:  Discomfort or pain in your back, neck, jaw, stomach, or arm  Shortness of breath  Nausea or vomiting  Lightheadedness or a sudden cold sweat  You have any of the following signs of a stroke:  Numbness or drooping on one side of your face  Weakness in an arm or leg  Confusion or difficulty speaking  Dizziness, a severe headache, or vision loss  Please take your aspirin and Atorvastatin as precribed. Please continue taking your medication as prescribed. If you have any questions or concerns about your medication please direct them to your prescribing Healthcare Provider.  Please follow up with your Neurologist, Cardiologist and Primary Care Physician in 1week.      SECONDARY DISCHARGE DIAGNOSES  Diagnosis: HTN (hypertension)  Assessment and Plan of Treatment: WHAT YOU NEED TO KNOW:  Hypertension is high blood pressure. Your blood pressure is the force of your blood moving against the walls of your arteries. Hypertension causes your blood pressure to get so high that your heart has to work much harder than normal. This can damage your heart. The cause of hypertension may not be known. This is called essential or primary hypertension. Hypertension caused by another medical condition, such as kidney disease, is called secondary hypertension.  DISCHARGE INSTRUCTIONS:  Call your local emergency number (915 in the [place]) or have someone call if:  You have chest pain.  You have any of the following signs of a heart attack:  Squeezing, pressure, or pain in your chest  You may also have any of the following:  Discomfort or pain in your back, neck, jaw, stomach, or arm  Shortness of breath  Nausea or vomiting  Lightheadedness or a sudden cold sweat  You become confused or have trouble speaking.  You suddenly feel lightheaded or have trouble breathing.  Seek care immediately if:  You have a severe headache or vision loss.  You have weakness in an arm or leg.  Please continue taking your medication as prescribed. If you have any questions or concerns about your medication please direct them to your prescribing Healthcare Provider.  Please follow up with Primary Care Physician in 1 week for futher management.      Diagnosis: Deep vein thrombosis (DVT)  Assessment and Plan of Treatment: Please continue taking your medication as prescribed. If you have any questions or concerns about your medication please direct them to your prescribing Healthcare Provider.  You will be needing anticoagulation (blood thinner) medication management. Please seek medical attention when you have:  -passing blood in your urine  -passing blood when you poo or having black poo  -severe bruising  -nosebleeding that does not stop.  -vomiting blood or coughing up blood.  Please follow up with your Primary Care Physician in 1 week.    Diagnosis: BPH (benign prostatic hyperplasia)  Assessment and Plan of Treatment: Please continue taking your medication as prescribed. If you have any questions or concerns about your medication please direct them to your prescribing Healthcare Provider.      Diagnosis: Eczema  Assessment and Plan of Treatment: Please continue taking your medication as prescribed. If you have any questions or concerns about your medication please direct them to your prescribing Healthcare Provider.

## 2022-01-21 NOTE — PROGRESS NOTE ADULT - SUBJECTIVE AND OBJECTIVE BOX
CHIEF COMPLAINT:Patient is a 77y old  Male who presents with a chief complaint of Stroke.Pt appears comfortable.    	  REVIEW OF SYSTEMS:  CONSTITUTIONAL: No fever, weight loss, or fatigue  EYES: No eye pain, visual disturbances, or discharge  ENT:  No difficulty hearing, tinnitus, vertigo; No sinus or throat pain  NECK: No pain or stiffness  RESPIRATORY: No cough, wheezing, chills or hemoptysis; No Shortness of Breath  CARDIOVASCULAR: No chest pain, palpitations, passing out, dizziness, or leg swelling  GASTROINTESTINAL: No abdominal or epigastric pain. No nausea, vomiting, or hematemesis; No diarrhea or constipation. No melena or hematochezia.  GENITOURINARY: No dysuria, frequency, hematuria, or incontinence  NEUROLOGICAL: No headaches, memory loss, loss of strength, numbness, or tremors  SKIN: No itching, burning, rashes, or lesions   LYMPH Nodes: No enlarged glands  ENDOCRINE: No heat or cold intolerance; No hair loss  MUSCULOSKELETAL: No joint pain or swelling; No muscle, back, or extremity pain  PSYCHIATRIC: No depression, anxiety, mood swings, or difficulty sleeping  HEME/LYMPH: No easy bruising, or bleeding gums  ALLERGY AND IMMUNOLOGIC: No hives or eczema	      PHYSICAL EXAM:  T(C): 36.4 (01-21-22 @ 05:15), Max: 36.7 (01-20-22 @ 20:27)  HR: 53 (01-21-22 @ 11:51) (50 - 66)  BP: 122/63 (01-21-22 @ 11:51) (122/63 - 137/76)  RR: 18 (01-21-22 @ 05:15) (18 - 18)  SpO2: 100% (01-21-22 @ 05:15) (98% - 100%)  Wt(kg): --  I&O's Summary      Appearance: Normal	  HEENT:   Normal oral mucosa, PERRL, EOMI	  Lymphatic: No lymphadenopathy  Cardiovascular: Normal S1 S2, No JVD, No murmurs, No edema  Respiratory: Lungs clear to auscultation	  Psychiatry: A & O x 3, Mood & affect appropriate  Gastrointestinal:  Soft, Non-tender, + BS	  Skin: No rashes, No ecchymoses, No cyanosis	  Neurologic: Non-focal  Extremities: Normal range of motion, No clubbing, cyanosis or edema  Vascular: Peripheral pulses palpable 2+ bilaterally    MEDICATIONS  (STANDING):  ascorbic acid 500 milliGRAM(s) Oral daily  aspirin  chewable 81 milliGRAM(s) Oral daily  atorvastatin 40 milliGRAM(s) Oral at bedtime  calcium carbonate    500 mG (Tums) Chewable 2 Tablet(s) Chew daily  celecoxib 200 milliGRAM(s) Oral daily  cholecalciferol 2000 Unit(s) Oral daily  cycloSPORINE  (SandIMMUNE) 100 milliGRAM(s) Oral daily  finasteride 5 milliGRAM(s) Oral daily  folic acid 1 milliGRAM(s) Oral daily  latanoprost 0.005% Ophthalmic Solution 1 Drop(s) Both EYES at bedtime  lisinopril 5 milliGRAM(s) Oral daily  oxybutynin 10 milliGRAM(s) Oral two times a day  pantoprazole    Tablet 40 milliGRAM(s) Oral before breakfast  polyethylene glycol 3350 17 Gram(s) Oral daily  rivaroxaban 10 milliGRAM(s) Oral with dinner  trimethoprim  40 mG/sulfamethoxazole 200 mG Suspension 160 milliGRAM(s) Oral <User Schedule>    	  	  LABS:	 	    Troponin I, High Sensitivity Result: 7.2 ng/L (01-18 @ 17:50)                            14.0   4.27  )-----------( 195      ( 21 Jan 2022 09:06 )             42.5     01-21    143  |  107  |  26<H>  ----------------------------<  109<H>  3.8   |  30  |  1.02    Ca    9.6      21 Jan 2022 09:06    TPro  6.4  /  Alb  3.3<L>  /  TBili  0.5  /  DBili  x   /  AST  13  /  ALT  22  /  AlkPhos  63  01-21      Lipid Profile: Cholesterol 124  LDL --  HDL 61  TG 49  Ldl calc 53      TSH: Thyroid Stimulating Hormone, Serum: 3.25 uU/mL (01-20 @ 07:05)  Thyroid Stimulating Hormone, Serum: 3.41 uU/mL (01-19 @ 06:52)

## 2022-01-21 NOTE — PROGRESS NOTE ADULT - ASSESSMENT
Patient is a 77 year old male from home with PMHx of CAD s/p 5 stents, DVT, HTN, BPH, carotid stenosis, dysequilibrium s/p ACDF C5-C7, PARTIAL CORPECTOMY C6. Patient presented with the complaint of temperature difference between the right and left side of the body. Patient admitted to telemetry for stroke work up. Code stoke in ED, CT head for negative for brain hemorrhage. CT perfusion: nondiagnostic due to artifacts. MRI brain resulting small acute and/or subacute lacunar infarct to the left thalamus. Chronic ischemic changes are also noted in both hemispheres with volume loss and involutional change. No enhancing brain lesion identified; inflammatory changes are noted in the paranasal sinuses and right mastoid cells. Neurology consulted.  ECHO unremarkable.

## 2022-01-21 NOTE — PROGRESS NOTE ADULT - ASSESSMENT
78 y/o male with PMHx of CAD s/p 5 stents, DVT, HTN, BPH, carotid stenosis, dysequilibrium s/p ACDF C5-C7, PARTIAL CORPECTOMY C6 presents with the complaint of temperature difference between the right and left side of the body,Lt lacunar infarct.  1.Neurology f/u-d/w -plavix-hx of bleeding on it,pt prefers asa,statin.  2.CAD-cont cardiac medication.  3.HTN-cont bp medication.  4.Lipid d/o-statin.  5.GI and DVT prophylaxis.  6.Hx of DVT-xarelto.

## 2022-01-21 NOTE — PROGRESS NOTE ADULT - PROBLEM SELECTOR PLAN 7
pending ECHO  PT recommending home PT  pending speech and swallow
PT recommending home PT  pending Neurology consult
pending neuro eval   pending echo

## 2022-01-21 NOTE — DISCHARGE NOTE PROVIDER - NSDCMRMEDTOKEN_GEN_ALL_CORE_FT
celecoxib 200 mg oral capsule: 1 cap(s) orally once a day  Citrucel 500 mg oral tablet: 4 tab(s) orally once a day  Cosamin  mg-400 mg oral tablet: 1 tab(s) orally once a day  cycloSPORINE 100 mg oral capsule: 1 cap(s) orally once a day  folic acid 1 mg oral tablet: 4 tab(s) orally once a day  latanoprost 0.005% ophthalmic solution: 1 drop(s) to each affected eye once a day (in the evening)  methenamine hippurate 1 g oral tablet: 2 tab(s) orally once a day  MiraLax oral powder for reconstitution: 17 gram(s) orally once a day  Proscar 5 mg oral tablet: tab(s) orally once a day  ramipril 1.25 mg oral tablet: 1 tab(s) orally once a day  rosuvastatin 5 mg oral capsule: 1 cap(s) orally Monday, Wednesday, and Friday  Sulfatrim 200 mg-40 mg/5 mL oral suspension: 20 milliliter(s) orally 3 times a week  Tums 500 mg oral tablet, chewable: 2 tab(s) orally once a day  Vitamin C 500 mg oral tablet: 1 tab(s) orally once a day  Vitamin D3 50 mcg (2000 intl units) oral tablet: 1 tab(s) orally once a day  Xarelto 10 mg oral tablet: 1 tab(s) orally once a day  Zetia 10 mg oral tablet: 1 tab(s) orally once a day   aspirin 81 mg oral tablet, chewable: 1 tab(s) orally once a day  atorvastatin 40 mg oral tablet: 1 tab(s) orally once a day (at bedtime)  celecoxib 200 mg oral capsule: 1 cap(s) orally once a day  Citrucel 500 mg oral tablet: 4 tab(s) orally once a day  Cosamin  mg-400 mg oral tablet: 1 tab(s) orally once a day  cycloSPORINE 100 mg oral capsule: 1 cap(s) orally once a day  folic acid 1 mg oral tablet: 4 tab(s) orally once a day  latanoprost 0.005% ophthalmic solution: 1 drop(s) to each affected eye once a day (in the evening)  methenamine hippurate 1 g oral tablet: 2 tab(s) orally once a day  MiraLax oral powder for reconstitution: 17 gram(s) orally once a day  Proscar 5 mg oral tablet: tab(s) orally once a day  ramipril 1.25 mg oral tablet: 1 tab(s) orally once a day  Sulfatrim 200 mg-40 mg/5 mL oral suspension: 20 milliliter(s) orally 3 times a week  Tums 500 mg oral tablet, chewable: 2 tab(s) orally once a day  Vitamin C 500 mg oral tablet: 1 tab(s) orally once a day  Vitamin D3 50 mcg (2000 intl units) oral tablet: 1 tab(s) orally once a day  Xarelto 10 mg oral tablet: 1 tab(s) orally once a day

## 2022-01-21 NOTE — DISCHARGE NOTE PROVIDER - CARE PROVIDER_API CALL
Ghazala Jimenez  INTERNAL MEDICINE  [address] [place] [address]  Phone: [phone]  Fax: [phone]  Follow Up Time: 1 week    Fredy Roblero)  Clinical Neurophysiology; Neurology; Sleep Medicine  [address] [facility], [address] [place] [address]  Phone: [phone]  Fax: [phone]  Follow Up Time: 1 week

## 2022-01-21 NOTE — PROGRESS NOTE ADULT - SUBJECTIVE AND OBJECTIVE BOX
NP Note discussed with  Primary Attending    Patient is a 77y old  Male who presents with a chief complaint of Stroke (21 Jan 2022 13:31)      INTERVAL HPI/OVERNIGHT EVENTS: Patient seen and examined at bedside.    MEDICATIONS  (STANDING):  ascorbic acid 500 milliGRAM(s) Oral daily  aspirin  chewable 81 milliGRAM(s) Oral daily  atorvastatin 40 milliGRAM(s) Oral at bedtime  calcium carbonate    500 mG (Tums) Chewable 2 Tablet(s) Chew daily  celecoxib 200 milliGRAM(s) Oral daily  cholecalciferol 2000 Unit(s) Oral daily  cycloSPORINE  (SandIMMUNE) 100 milliGRAM(s) Oral daily  finasteride 5 milliGRAM(s) Oral daily  folic acid 1 milliGRAM(s) Oral daily  latanoprost 0.005% Ophthalmic Solution 1 Drop(s) Both EYES at bedtime  lisinopril 5 milliGRAM(s) Oral daily  oxybutynin 10 milliGRAM(s) Oral two times a day  pantoprazole    Tablet 40 milliGRAM(s) Oral before breakfast  polyethylene glycol 3350 17 Gram(s) Oral daily  rivaroxaban 10 milliGRAM(s) Oral with dinner  trimethoprim  40 mG/sulfamethoxazole 200 mG Suspension 160 milliGRAM(s) Oral <User Schedule>    MEDICATIONS  (PRN):      __________________________________________________  REVIEW OF SYSTEMS:    CONSTITUTIONAL: No fever,   EYES: no acute visual disturbances  NECK: No pain or stiffness  RESPIRATORY: No cough; No shortness of breath  CARDIOVASCULAR: No chest pain, no palpitations  GASTROINTESTINAL: No pain. No nausea or vomiting; No diarrhea   NEUROLOGICAL:  left side warm sensation than right, No headache or numbness, no tremors  MUSCULOSKELETAL: No joint pain, no muscle pain  GENITOURINARY: no dysuria, no frequency, no hesitancy  PSYCHIATRY: no depression , no anxiety  ALL OTHER  ROS negative      Vital Signs Last 24 Hrs  T(C): 36.4 (21 Jan 2022 05:15), Max: 36.7 (20 Jan 2022 20:27)  T(F): 97.5 (21 Jan 2022 05:15), Max: 98 (20 Jan 2022 20:27)  HR: 53 (21 Jan 2022 11:51) (50 - 66)  BP: 122/63 (21 Jan 2022 11:51) (122/63 - 137/76)  BP(mean): --  RR: 18 (21 Jan 2022 05:15) (18 - 18)  SpO2: 100% (21 Jan 2022 05:15) (98% - 100%)    ________________________________________________  PHYSICAL EXAM:  GENERAL: NAD  HEENT: Normocephalic;  conjunctivae and sclerae clear; moist mucous membranes;   NECK : supple  CHEST/LUNG: Clear to auscultation bilaterally with good air entry   HEART: S1 S2  regular; no murmurs, gallops or rubs  ABDOMEN: Soft, Nontender, Nondistended; Bowel sounds present  EXTREMITIES: weakness, no cyanosis; no edema; no calf tenderness  SKIN: warm and dry; no rash  NERVOUS SYSTEM:  Awake and alert; Oriented  to place, person and time ; no new deficits    _________________________________________________  LABS:                        14.0   4.27  )-----------( 195      ( 21 Jan 2022 09:06 )             42.5     01-21    143  |  107  |  26<H>  ----------------------------<  109<H>  3.8   |  30  |  1.02    Ca    9.6      21 Jan 2022 09:06    TPro  6.4  /  Alb  3.3<L>  /  TBili  0.5  /  DBili  x   /  AST  13  /  ALT  22  /  AlkPhos  63  01-21        CAPILLARY BLOOD GLUCOSE            RADIOLOGY & ADDITIONAL TESTS:  < from: MR Head w/wo IV Cont (01.19.22 @ 19:35) >    ACC: [id number] EXAM:  MR BRAIN [facility] IC                          PROCEDURE DATE:  01/19/2022          INTERPRETATION:  INDICATION:    Sensory disturbance.  TECHNIQUE:  Multiplanar brain imaging was conducted. T1, T2 and FLAIR   imaging was performed.In addition, diffusion imaging, diffusion   coefficient assessment (ADC) and T2* was incorporated . Post contrast   imaging was performed. 7.5 cc Gadavist was administered.  COMPARISON EXAMINATION:  CT brain 1/18/2022    FINDINGS:    HEMISPHERES: An acute or subacute lacunar infarct is noted in the left   thalamus, with a small ovoid area of diffusion restriction.. Also noted   are chronic ischemic changes in both hemispheres with volume loss. There   is Virchow-Tyson space or dilatation in the basal ganglia, and to a   lesser extent, in the subcortical white matter. No microhemorrhage or   mass lesion is noted. With contrast administration, no enhancing brain   lesion is noted.  VENTRICLES:  midline and normal in size  POSTERIOR FOSSA:  No acute abnormality noted.  EXTRA-AXIAL:  No mass or collections are depicted.  VASCULATURE:  The major vessels and venous sinuses are grossly patent.  SINUSES AND MASTOIDS:  Mucosal thickening noted in the sinuses. Right   mastoid partial opacification is noted as well.  MISCELLANEOUS:  Degenerative changes are noted in the cervical spine and   there are artifacts suggesting prior ACDF in the mid cervical region.   This is consistent with the CT appearance and 5/7/2021.    IMPRESSION:    1)  A smallacute and/or subacute lacunar infarct is noted in the left   thalamus. Chronic ischemic changes are also noted in both hemispheres   with volume loss and involutional change. No enhancing brain lesion   identified. See above.  2)  inflammatory changes are noted in the paranasal sinuses and right   mastoid cells.    --- End of Report ---    < end of copied text >  < from: CT Angio Neck w/ IV Cont (01.18.22 @ 17:28) >    ACC: [id number] EXAM:  CT PERFUSION W MAPS IC                        ACC: [id number] EXAM:  CT ANGIO BRAIN (W)AW IC                        ACC: [id number] EXAM:  CT BRAIN STROKE PROTOCOL                        ACC: [id number] EXAM:  CT ANGIO NECK (W)AW IC                        PROCEDURE DATE:  01/18/2022          INTERPRETATION:  CLINICAL STATEMENT: CVA    TECHNIQUE: CTA brain and neck. CT perfusion: After the administration of   50  cc of Omnipaque 300 serial thin sections were obtained through thebrain   the  purposes of evaluating CT perfusion. Raw data was sent to the ischemia   rapid  view software for postprocessing. 90 cc Omnipaque 350 Intravenous contrast  was administered for the CTA compartment. 2-D MIP and 3-D volume rendering  images.    COMPARISON: None.    FINDINGS:  CTA of the neck:      The common carotid and internal carotid arteries are patent. Calcified   atherosclerotic plaques carotid bulbs noted.    The extracranial vertebral arteries are patent. Dominant right vertebral   artery    Thyroid nodule noted in the isthmus. Multilevel degenerative changes   noted in the cervical spine. Anterior cervical fusion also noted.    CTA Head:  Calcified atherosclerotic plaques at the cavernous and supraclinoid   internal carotid arteries resulting in narrowing of bilateral   supraclinoid internal carotid arteries.    The proximal anterior, middle and posterior cerebral arteries are patent.   Hypoplastic A1 segment right anterior cerebral artery Mild narrowing in   the proximal left posterior cerebral artery. Fetal origin right posterior   cerebral artery.    The intracranial vertebral and basilar arteries are patent. Distal left   vertebral artery terminates as the posterior inferior cerebellar artery.   Mild narrowing at the V4 segment of distal right vertebral artery due to   calcified atherosclerotic plaque    There is no intracranial aneurysm.    CT Head:  There is mild diffuse parenchymal volume loss. There are areas of low   attenuation in the periventricular white matter likely related to mild   chronic microvascular ischemic changes.    There is no acute intracranial hemorrhage, parenchymal mass, mass effect   or midline shift. There is no acute territorial infarct. There is no   hydrocephalus. Partial empty sella    The cranium is intact. Mucosal thickening paranasal sinuses.    CT perfusion:  No core infarct identified.    Elevated Tmax noted bilateral frontal and parietal lobes likely   artifactual.    CBF<30% volume: 0 ml  Tmax>6.0 s volume: 114 ml  Mismatch volume: 114 ml  Mismatch ratio: Infinite    IMPRESSION:     No acute intracranial hemorrhage or acute territorial infarct. If acute   ischemia is a strong clinical concern, MRI exam is recommended for   further evaluation if there is no contraindication. Noncontrast head CT   report notified to Dr. Mcdaniel on 1/18/2022 at 5:32 PM      CT perfusion nondiagnostic due to artifacts.    No hemodynamically significant stenosis in the neck.    Intracranial narrowing as described above    --- End of Report ---      < end of copied text >  < from: Transthoracic Echocardiogram (01.19.22 @ 07:40) >    Derived Variables:  LVMI: 102 g/m2  RWT: 0.43  Ejection Fraction Visual Estimate: >55 %    ------------------------------------------------------------------------  OBSERVATIONS:  Mitral Valve: Normal mitral valve. Mild mitral  regurgitation.  Aortic Root: Normal aortic root.  Aortic Valve: Normal trileaflet aortic valve. Mild aortic  regurgitation.  Left Atrium: Moderately dilated left atrium.  LA volume  index = 43 cc/m2.  Left Ventricle: Normal left ventricular systolic function.  Normal left ventricular internal dimensions and wall  thicknesses. Unable to adequately assess diastolic function  due to technical aspects of this study.  Right Heart: Normal right atrium. Normal right ventricular  size and function. There is mild tricuspid regurgitation.  There is trace pulmonic regurgitation.  Pericardium/PleuraNormal pericardium with no pericardial  effusion.  Hemodynamic: RA Pressure is 10 mm Hg. RV systolic pressure  is 31 mm Hg. Agitated saline injection was negative for  intracardiac shunt.  ------------------------------------------------------------------------  CONCLUSIONS:  1. Mild mitral regurgitation.  2.  Mild aortic regurgitation.  3. Moderately dilated left atrium.  LA volume index = 43  cc/m2.  4. Normal left ventricular internal dimensions and wall  thicknesses.  5. Normal left ventricular systolic function.  6. Normalright ventricular size and function.  7. Agitated saline injection was negative for intracardiac  shunt.      < end of copied text >    Imaging  Reviewed:  YES    Consultant(s) Notes Reviewed:   YES      Plan of care was discussed with patient and /or primary care giver; all questions and concerns were addressed

## 2022-01-21 NOTE — CHART NOTE - NSCHARTNOTEFT_GEN_A_CORE
· Subjective and Objective:    2022 13:31)  `78 y/o male with PMHx of CAD s/p 5 stents, DVT, HTN, BPH, carotid stenosis, dysequilibrium s/p ACDF C5-C7, PARTIAL CORPECTOMY C6 presents with the complaint of temperature difference between the right and left side of the body. Pt states that at around 12:30-1pm this afternoon  he noticed that his right side felt warmer than the other side. He states he was outside in the car waiting for his wife when the symptom started. He states he stills feels it, states right side of the body feels like a radiator from head to toe.  The patient thought the symptoms would improve, however when the did not the patient called his PMD.  The PMD called back an hour later and advised the patient to go to the ED.  The patient denies any numbness, weakness, facial droop, LOC, seizures,     INTERVAL HPI/OVERNIGHT EVENTS: Patient seen and examined at bedside.    MEDICATIONS  (STANDING):  ascorbic acid 500 milliGRAM(s) Oral daily  aspirin  chewable 81 milliGRAM(s) Oral daily  atorvastatin 40 milliGRAM(s) Oral at bedtime  calcium carbonate    500 mG (Tums) Chewable 2 Tablet(s) Chew daily  celecoxib 200 milliGRAM(s) Oral daily  cholecalciferol 2000 Unit(s) Oral daily  cycloSPORINE  (SandIMMUNE) 100 milliGRAM(s) Oral daily  finasteride 5 milliGRAM(s) Oral daily  folic acid 1 milliGRAM(s) Oral daily  latanoprost 0.005% Ophthalmic Solution 1 Drop(s) Both EYES at bedtime  lisinopril 5 milliGRAM(s) Oral daily  oxybutynin 10 milliGRAM(s) Oral two times a day  pantoprazole    Tablet 40 milliGRAM(s) Oral before breakfast  polyethylene glycol 3350 17 Gram(s) Oral daily  rivaroxaban 10 milliGRAM(s) Oral with dinner  trimethoprim  40 mG/sulfamethoxazole 200 mG Suspension 160 milliGRAM(s) Oral <User Schedule>    MEDICATIONS  (PRN):      __________________________________________________  REVIEW OF SYSTEMS:    CONSTITUTIONAL: No fever,   EYES: no acute visual disturbances  NECK: No pain or stiffness  RESPIRATORY: No cough; No shortness of breath  CARDIOVASCULAR: No chest pain, no palpitations  GASTROINTESTINAL: No pain. No nausea or vomiting; No diarrhea   NEUROLOGICAL:  left side warm sensation than right, No headache or numbness, no tremors  MUSCULOSKELETAL: No joint pain, no muscle pain  GENITOURINARY: no dysuria, no frequency, no hesitancy  PSYCHIATRY: no depression , no anxiety  ALL OTHER  ROS negative      Vital Signs Last 24 Hrs  T(C): 36.4 (21 Jan 2022 05:15), Max: 36.7 (20 Jan 2022 20:27)  T(F): 97.5 (21 Jan 2022 05:15), Max: 98 (20 Jan 2022 20:27)  HR: 53 (21 Jan 2022 11:51) (50 - 66)  BP: 122/63 (21 Jan 2022 11:51) (122/63 - 137/76)  BP(mean): --  RR: 18 (21 Jan 2022 05:15) (18 - 18)  SpO2: 100% (21 Jan 2022 05:15) (98% - 100%)    ________________________________________________  PHYSICAL EXAM:  GENERAL: NAD  HEENT: Normocephalic;  conjunctivae and sclerae clear; moist mucous membranes;   NECK : supple  CHEST/LUNG: Clear to auscultation bilaterally with good air entry   HEART: S1 S2  regular; no murmurs, gallops or rubs  ABDOMEN: Soft, Nontender, Nondistended; Bowel sounds present  EXTREMITIES: weakness, no cyanosis; no edema; no calf tenderness  SKIN: warm and dry; no rash  NERVOUS SYSTEM:   A x O x 3. Appropriately interactive, normal affect. Speech fluent, No Aphasia or paraphasic errors. Naming /repetition intact   CN: CLARIBEL, EOMI, VFF, facial sensation normal, no NLFD, tongue midline, Palate moves equally, SCM equal bilaterally  Motor: No pronator drift, Strength 5/5 in all 4 ext, normal bulk and tone, no tremor, rigidity or bradykinesia.    Sens: Intact to light touch / PP/ VS/ JS    Reflexes: Symmetric and normal . BJ 2+, BR 2+, KJ 2+, AJ 2+, downgoing toes b/l  Coord:  No FNFA, dysmetria, JENARO intact   Gait/Balance: Romberg's positive; cannot walk tandem    _________________________________________________  LABS:                        14.0   4.27  )-----------( 195      ( 21 Jan 2022 09:06 )             42.5     01-21    143  |  107  |  26<H>  ----------------------------<  109<H>  3.8   |  30  |  1.02    Ca    9.6      21 Jan 2022 09:06    TPro  6.4  /  Alb  3.3<L>  /  TBili  0.5  /  DBili  x   /  AST  13  /  ALT  22  /  AlkPhos  63  01-21        CAPILLARY BLOOD GLUCOSE            RADIOLOGY & ADDITIONAL TESTS:  < from: MR Head w/wo IV Cont (01.19.22 @ 19:35) >    ACC: [id number] EXAM:  MR BRAIN WAW IC                          PROCEDURE DATE:  01/19/2022          INTERPRETATION:  INDICATION:    Sensory disturbance.  TECHNIQUE:  Multiplanar brain imaging was conducted. T1, T2 and FLAIR   imaging was performed.In addition, diffusion imaging, diffusion   coefficient assessment (ADC) and T2* was incorporated . Post contrast   imaging was performed. 7.5 cc Gadavist was administered.  COMPARISON EXAMINATION:  CT brain 1/18/2022    FINDINGS:    HEMISPHERES: An acute or subacute lacunar infarct is noted in the left   thalamus, with a small ovoid area of diffusion restriction.. Also noted   are chronic ischemic changes in both hemispheres with volume loss. There   is Virchow-Tyson space or dilatation in the basal ganglia, and to a   lesser extent, in the subcortical white matter. No microhemorrhage or   mass lesion is noted. With contrast administration, no enhancing brain   lesion is noted.  VENTRICLES:  midline and normal in size  POSTERIOR FOSSA:  No acute abnormality noted.  EXTRA-AXIAL:  No mass or collections are depicted.  VASCULATURE:  The major vessels and venous sinuses are grossly patent.  SINUSES AND MASTOIDS:  Mucosal thickening noted in the sinuses. Right   mastoid partial opacification is noted as well.  MISCELLANEOUS:  Degenerative changes are noted in the cervical spine and   there are artifacts suggesting prior ACDF in the mid cervical region.   This is consistent with the CT appearance and 5/7/2021.    IMPRESSION:    1)  A smallacute and/or subacute lacunar infarct is noted in the left   thalamus. Chronic ischemic changes are also noted in both hemispheres   with volume loss and involutional change. No enhancing brain lesion   identified. See above.  2)  inflammatory changes are noted in the paranasal sinuses and right   mastoid cells.    --- End of Report ---    < end of copied text >  < from: CT Angio Neck w/ IV Cont (01.18.22 @ 17:28) >    ACC: [id number] EXAM:  CT PERFUSION W MAPS IC                        ACC: [id number] EXAM:  CT ANGIO BRAIN (W)AW IC                        ACC: [id number] EXAM:  CT BRAIN STROKE PROTOCOL                        ACC: [id number] EXAM:  CT ANGIO NECK (W)AW IC                        PROCEDURE DATE:  01/18/2022          INTERPRETATION:  CLINICAL STATEMENT: CVA    TECHNIQUE: CTA brain and neck. CT perfusion: After the administration of   50  cc of Omnipaque 300 serial thin sections were obtained through thebrain   the  purposes of evaluating CT perfusion. Raw data was sent to the ischemia   rapid  view software for postprocessing. 90 cc Omnipaque 350 Intravenous contrast  was administered for the CTA compartment. 2-D MIP and 3-D volume rendering  images.    COMPARISON: None.    FINDINGS:  CTA of the neck:      The common carotid and internal carotid arteries are patent. Calcified   atherosclerotic plaques carotid bulbs noted.    The extracranial vertebral arteries are patent. Dominant right vertebral   artery    Thyroid nodule noted in the isthmus. Multilevel degenerative changes   noted in the cervical spine. Anterior cervical fusion also noted.    CTA Head:  Calcified atherosclerotic plaques at the cavernous and supraclinoid   internal carotid arteries resulting in narrowing of bilateral   supraclinoid internal carotid arteries.    The proximal anterior, middle and posterior cerebral arteries are patent.   Hypoplastic A1 segment right anterior cerebral artery Mild narrowing in   the proximal left posterior cerebral artery. Fetal origin right posterior   cerebral artery.    The intracranial vertebral and basilar arteries are patent. Distal left   vertebral artery terminates as the posterior inferior cerebellar artery.   Mild narrowing at the V4 segment of distal right vertebral artery due to   calcified atherosclerotic plaque    There is no intracranial aneurysm.    CT Head:  There is mild diffuse parenchymal volume loss. There are areas of low   attenuation in the periventricular white matter likely related to mild   chronic microvascular ischemic changes.    There is no acute intracranial hemorrhage, parenchymal mass, mass effect   or midline shift. There is no acute territorial infarct. There is no   hydrocephalus. Partial empty sella    The cranium is intact. Mucosal thickening paranasal sinuses.    CT perfusion:  No core infarct identified.    Elevated Tmax noted bilateral frontal and parietal lobes likely   artifactual.    CBF<30% volume: 0 ml  Tmax>6.0 s volume: 114 ml  Mismatch volume: 114 ml  Mismatch ratio: Infinite    IMPRESSION:     No acute intracranial hemorrhage or acute territorial infarct. If acute   ischemia is a strong clinical concern, MRI exam is recommended for   further evaluation if there is no contraindication. Noncontrast head CT   report notified to Dr. Mcdaniel on 1/18/2022 at 5:32 PM      CT perfusion nondiagnostic due to artifacts.    No hemodynamically significant stenosis in the neck.    Intracranial narrowing as described above    --- End of Report ---      < end of copied text >  < from: Transthoracic Echocardiogram (01.19.22 @ 07:40) >    Derived Variables:  LVMI: 102 g/m2  RWT: 0.43  Ejection Fraction Visual Estimate: >55 %    ------------------------------------------------------------------------  OBSERVATIONS:  Mitral Valve: Normal mitral valve. Mild mitral  regurgitation.  Aortic Root: Normal aortic root.  Aortic Valve: Normal trileaflet aortic valve. Mild aortic  regurgitation.  Left Atrium: Moderately dilated left atrium.  LA volume  index = 43 cc/m2.  Left Ventricle: Normal left ventricular systolic function.  Normal left ventricular internal dimensions and wall  thicknesses. Unable to adequately assess diastolic function  due to technical aspects of this study.  Right Heart: Normal right atrium. Normal right ventricular  size and function. There is mild tricuspid regurgitation.  There is trace pulmonic regurgitation.  Pericardium/PleuraNormal pericardium with no pericardial  effusion.  Hemodynamic: RA Pressure is 10 mm Hg. RV systolic pressure  is 31 mm Hg. Agitated saline injection was negative for  intracardiac shunt.  ------------------------------------------------------------------------  CONCLUSIONS:  1. Mild mitral regurgitation.  2.  Mild aortic regurgitation.  3. Moderately dilated left atrium.  LA volume index = 43  cc/m2.  4. Normal left ventricular internal dimensions and wall  thicknesses.  5. Normal left ventricular systolic function.  6. Normalright ventricular size and function.  7. Agitated saline injection was negative for intracardiac  shunt.      < end of copied text >    Imaging  Reviewed:  YES    Discussed MRI scan and significance of lacunar stroke and its pathology/ pathogenesis in comparison with a larger cortical ischemic stroke. Advised to continue aspirin atorvastatin and antihypertension medications life long to prevent recurrence of stroke. Follow up with cardiology regularly    Time spent 35 minutes direct patient care and discussion.

## 2022-01-21 NOTE — PROGRESS NOTE ADULT - PROBLEM SELECTOR PLAN 2
On Ramipril at home  Will hold for permissive HTN for 24 hours
On Ramipril at home  c/w Lisinopril
On Ramipril at home  c/w Lisinopril

## 2022-01-21 NOTE — PROGRESS NOTE ADULT - PROBLEM SELECTOR PLAN 3
On Xarelto at home  Continue Xarelto
On xarelto at home  Continue home medication
On xarelto at home  Continue xarelto

## 2022-01-27 ENCOUNTER — APPOINTMENT (OUTPATIENT)
Dept: CARE COORDINATION | Facility: HOME HEALTH | Age: 77
End: 2022-01-27
Payer: MEDICARE

## 2022-01-27 VITALS
TEMPERATURE: 98.1 F | RESPIRATION RATE: 16 BRPM | HEART RATE: 66 BPM | OXYGEN SATURATION: 98 % | SYSTOLIC BLOOD PRESSURE: 120 MMHG | DIASTOLIC BLOOD PRESSURE: 70 MMHG

## 2022-01-27 DIAGNOSIS — I63.81 OTHER CEREBRAL INFARCTION DUE TO OCCLUSION OR STENOSIS OF SMALL ARTERY: ICD-10-CM

## 2022-01-27 DIAGNOSIS — I10 ESSENTIAL (PRIMARY) HYPERTENSION: ICD-10-CM

## 2022-01-27 DIAGNOSIS — N40.0 BENIGN PROSTATIC HYPERPLASIA WITHOUT LOWER URINARY TRACT SYMPMS: ICD-10-CM

## 2022-01-27 DIAGNOSIS — I25.10 ATHEROSCLEROTIC HEART DISEASE OF NATIVE CORONARY ARTERY W/OUT ANGINA PECTORIS: ICD-10-CM

## 2022-01-27 DIAGNOSIS — I65.29 OCCLUSION AND STENOSIS OF UNSPECIFIED CAROTID ARTERY: ICD-10-CM

## 2022-01-27 DIAGNOSIS — I82.409 ACUTE EMBOLISM AND THROMBOSIS OF UNSPECIFIED DEEP VEINS OF UNSPECIFIED LOWER EXTREMITY: ICD-10-CM

## 2022-01-27 PROCEDURE — 99496 TRANSJ CARE MGMT HIGH F2F 7D: CPT

## 2022-01-27 NOTE — PHYSICAL EXAM
[No Acute Distress] : no acute distress [Well Nourished] : well nourished [Normal Sclera/Conjunctiva] : normal sclera/conjunctiva [No JVD] : no jugular venous distention [No Respiratory Distress] : no respiratory distress  [No Accessory Muscle Use] : no accessory muscle use [Clear to Auscultation] : lungs were clear to auscultation bilaterally [Normal S1, S2] : normal S1 and S2 [No Edema] : there was no peripheral edema [Soft] : abdomen soft [Normal Bowel Sounds] : normal bowel sounds [No Rash] : no rash [No Skin Lesions] : no skin lesions [Normal Affect] : the affect was normal [Alert and Oriented x3] : oriented to person, place, and time [Normal Mood] : the mood was normal [Comprehensive Foot Exam Normal] : Right and left foot were examined and both feet are normal. No ulcers in either foot. Toes are normal and with full ROM.  Normal tactile sensation with monofilament testing throughout both feet

## 2022-01-28 NOTE — REVIEW OF SYSTEMS
[Muscle Pain] : muscle pain [Muscle Weakness] : muscle weakness [Back Pain] : back pain [Fever] : no fever [Earache] : no earache [Hearing Loss] : no hearing loss [Chest Pain] : no chest pain [Palpitations] : no palpitations [Lower Ext Edema] : no lower extremity edema [Shortness Of Breath] : no shortness of breath [Wheezing] : no wheezing [Cough] : no cough [Abdominal Pain] : no abdominal pain [Nausea] : no nausea [Constipation] : no constipation [Incontinence] : no incontinence [Dizziness] : no dizziness [Unsteady Walk] : no ataxia [Memory Loss] : no memory loss [Insomnia] : no insomnia [Anxiety] : no anxiety

## 2022-01-28 NOTE — HISTORY OF PRESENT ILLNESS
[Spouse] : spouse [FreeTextEntry1] : F/u post hospital [facility] [de-identified] : Brice Richardson is a a 77 year old male from home with PMHx of CAD s/p 5 stents, DVT, HTN, BPH, carotid stenosis, dysequilibrium s/p ACDF C5-C7, PARTIAL CORPECTOMY C6. Patient presented with the complaint of temperature difference between the right and left side of the body. Patient admitted to telemetry for stroke work up. Code stoke in ED, CT head for negative for brain hemorrhage. CT perfusion: \par nondiagnostic due to artifacts. MRI brain resulting small acute and/or subacute lacunar infarct to the left thalamus. Chronic ischemic changes are also noted in both hemispheres with volume loss and involutional change. No enhancing brain lesion identified; inflammatory changes are noted in the paranasal sinuses and right mastoid cells. Being seen after discharge home from [facility]. He was admitted on 1/18/2022 for SOB/COPD and discharged home on 1/21/2022. Discharge medications from the discharge summary were reviewed and reconciled with the current medication list. Patient/caregiver reports that patient is adjusting  to being home. Caregiver support is as follows.\par HOME VISIT\par  Pt is alert and oriented x 3. Pt lives with his wife.  Reports he has been doing well since being discharged, no residual s/s of s stroke, no weakness to extremities, no speech changes. He ambulates independently indoors cannot go outdoors without assistance due to back pain. Reports he has spoken with his cardiologist - Ramipril dose increased to 2.5 qod and Crestor increased from 3 days per week to 5 days per week. With f/u for next week. Denies CP, SOB, vision changes,nausea, vomiting, diarrhea, constipation, falls, edema.  Reports appetite is intact, sleeping all night. Skin is intact, clean pt has dx of Eczema, taking Cyclosporine. Med rec done:  pt/caregiver reports they have all medications.  Instructed pt/caregiver on s/s stroke, skin care, medication. Yellow card given. Pt has appointment with his cardiologist scheduled for next week. \par

## 2022-02-09 NOTE — PHYSICAL THERAPY INITIAL EVALUATION ADULT - FINE MOTOR COORDINATION, RIGHT HAND, MANIPULATE OBJECTS, OT EVAL
Continue Prednisolone 2x daily in the RIGHT EYE through Sunday, then discontinue.    No drops on Monday.    Begin pre-op drops Ofloxacin  and Ketorolac 4 times per day in the LEFT EYE on Tuesday.    Nothing to eat or drink after midnight on Tuesday.     normal performance

## 2022-04-14 NOTE — PHYSICAL THERAPY INITIAL EVALUATION ADULT - FINE MOTOR COORDINATION, RIGHT HAND THUMB/FINGER OPPOSITION SKILLS, OT EVAL
Nursing Home Discharge Summary  [facility]    Patient Name Timothy Benz   MRN: [id number]   YOB: 1938       Admit date: 4/9/2022   Discharge date:  4/14/2022       Disposition:                   Skilled nursing facility: [facility]    Admitting Physician: No admitting provider for patient encounter.   Primary care provider: Edwin Louis MD  Discharge Physician: Rip Sommer MD    Primary Discharge Diagnoses:  · Acute hypoxic respiratory failure secondary to community-acquired pneumonia:  Patient is currently saturating well on room air.  Status post ceftriaxone azithromycin, deescalate to cefpodoxime for 7 day course of antibiotic therapy in total. Sepsis present on admission due to pneumonia.  Weaned down to room air  · CHF exacerbation ruled out:  No evidence of volume overload on exam.  Lasix discontinued  · Elevated troponin secondary to demand ischemia: Downtrending. Echocardiogram noted showing ejection fraction of 60% with grade 1 diastolic dysfunction.    · Acute renal insufficiency on CKD stage 3 secondary to diuresis:  Kidney function is back to baseline.  Lasix was discontinued.  Will resume home losartan, hydrochlorothiazide  · Baseline cognitive impairment suspect secondary to developmental delay.  No prior documentation of dementia.  Patient has guardianship  · Regarding the rest of the patient's chronic medical conditions, they are monitored during this admission and prior to admission medications are continued as indicated except as otherwise highlighted above.     Principal Problem:    Acute respiratory failure with hypoxia (CMS/HCC)  Resolved Problems:    * No resolved hospital problems. *      Past Medical History:    Essential (primary) hypertension                              Gastroesophageal reflux disease                               Urinary tract infection                                       Urinary incontinence                                           Arthritis                                                     Osteoporosis                                                  Blood clot associated with vein wall inflammat*                 Comment: 1984    Chronic pain                                                    Comment: Knee    Anxiety                                                       Consultations:  IP CONSULT TO CARDIAC REHAB  IP CONSULT TO PHARMACY  IP CONSULT TO SOCIAL WORK  Procedures:  None    Hospital Course (see H&P for details of admission):    Timothy Benz is a 84 year old male who presented on 4/9/2022 with complaints of Altered Mental Status and Respiratory Distress   and subsequently admitted for:  · Acute respiratory failure with hypoxia (CMS/[facility]) [J96.01]  · Acute on chronic congestive heart failure, unspecified heart failure type (CMS/[facility]) [I50.9]    HPI per admitting hospitalist: Timothy Benz is an 84-year-old male patient with past medical history significant for seizure disorder, history of DVT, primary hypertension, dementia, chronic kidney disease stage 3, GERD, primary hypertension who present emergency department with altered mental status.  Patient has advanced dementia from group home and could not provide history.  Patient has guardian.  Patient stated that for me he is feeling ok otherwise he could not provide significant history.  According to ER documentation, patient was brought for hypoxia.  He was brought by EMS.  As per EMS, patient was reportedly more altered on the day of presentation at his living facility for which was noted to have hypoxia of 72 % on room air.  Patient denied for me any chest pain or shortness of breath.  However he has multiple drug rash all over his body.  No other information available.  His guardian ischemic to the hospital today.     Initially admitted for suspected heart failure, he was diuresed with IV Lasix however he had worsening creatinine and thus Lasix  was stopped.  He was started on broad antibiotics with improvement in his respiratory symptoms and will finish oral antibiotics for treatment of a pneumonia.  She was hemodynamically stable and on room air at the time of discharge.  He is at his baseline mental status.      Code Status:  Selective Treatment/DNR    Discharge Labs:  Recent Labs   Lab 04/14/22  0505 04/13/22  1128 04/13/22  0502 04/12/22  0433 04/10/22  1302 04/10/22  0621 04/09/22  1128 04/09/22  0927 04/09/22  0925   SODIUM 142  --  142 144   < > 142  --   --  140   POTASSIUM 3.6 3.7 3.5 3.6   < > 3.0*  --   --  3.7   CHLORIDE 107  --  106 108*   < > 105  --   --  107   CO2 25  --  26 27   < > 25  --   --  25   BUN 20  --  22* 32*   < > 39*  --   --  30*   CREATININE 0.97  --  1.01 1.16   < > 1.71*  --   --  1.51*   GLUCOSE 102*  --  103* 105*   < > 105*  --   --  143*   MG  --   --   --   --   --  1.9 1.8  --  1.9   WBC 9.3  --  9.2 10.7   < > 12.9*  --   --  16.7*   HGB 9.9*  --  10.2* 10.4*   < > 10.3*  --   --  11.0*   HCT 30.5*  --  32.0* 32.4*   < > 31.7*  --   --  34.5*     --  167 146   < > 117*  --   --  127*   PT  --   --   --   --   --   --   --  12.6*  --    INR  --   --   --   --   --   --   --  1.2  --    PTT  --   --   --   --   --   --   --  28  --     < > = values in this interval not displayed.       Microbiology Results     None          Significant Diagnostic Studies and Procedures:   XR Chest AP or PA    Result Date: 4/14/2022  Narrative: EXAM: XR CHEST AP OR PA DATE OF EXAM: 4/14/2022 8:00 AM. COMPARISON: 4/19/2022. CLINICAL INFORMATION: dyspnea. FINDINGS: Heart is moderately enlarged. Pulmonary vessels are congested. Patchy area infiltrative/atelectatic changes evident the left base. Mild increased interstitial markings evident throughout the lungs. There is less than optimal degree of inspiration.     Impression: IMPRESSION: Overall findings suggest an element of a cardiac decompensation. Associated the left basilar  atelectasis/infiltrate suggested. Overall findings are similar to previous.    XR Chest AP or PA    Result Date: 4/9/2022  Narrative: HISTORY: Shortness of breath EXAM: AP chest x-ray COMPARISON: None. FINDINGS: Heart appears enlarged, accentuated by film technique. There may be mild vascular congestion or fluid overload as the central pulmonary arteries and interstitial are slightly prominent. There is no large effusion or pneumothorax. There is no focal infiltrate or groundglass opacity.     Impression: IMPRESSION: 1. Suggested mild fluid overload or vascular congestion with cardiomegaly. Confirm clinically. 2. No definite focal infiltrate/ground glass opacity, pneumothorax, or effusion    XR Chest PA and Lateral    Result Date: 4/9/2022  Narrative: EXAM: XR CHEST PA AND LATERAL, 4/9/2022 1:49 PM. HISTORY: Heart Failure COMPARISON: 4/9/2022 FINDINGS: Cardiac silhouette is mildly enlarged, unchanged. Pulmonary vasculature is within normal limits. Hazy left basilar/retrocardiac opacity. Mild thoracic spine degenerative changes.     Impression: IMPRESSION: Hazy left basilar opacity which could represent atelectasis or infection.    US Lower Extremity Venous Duplex Bilat    Result Date: 4/9/2022  Narrative: EXAM:  US LOWER EXTREMITY VENOUS DUPLEX BILATERAL DATE OF EXAM: 4/9/2022 3:02 PM 84 years-old Male with presenting history of leg swelling. ADDITIONAL HISTORY:  None. COMPARISON: None. Grayscale, spectral, compression and color-flow ultrasound was performed to evaluate the veins of the right and left lower extremity,  from the common femoral vein through the popliteal vein.  The upper posterior tibial veins and saphenous vein were also visualized. There is no evidence of deep venous thrombus. No other abnormalities are identified.     Impression: IMPRESSION: Normal bilateral lower extremity venous ultrasound.       Immunization History   Administered Date(s) Administered   • Influenza, seasonal, injectable,  trivalent 10/14/2005, 10/01/2008, 10/07/2009, 10/06/2011, 10/25/2012, 10/23/2013   • Pneumococcal polysaccharide, adult, 23 valent 11/25/2008   • Td:Adult type tetanus/diphtheria 05/22/2003   • Tdap 05/05/2014       Discharge Exam:  Blood pressure (!) 192/88, pulse 70, temperature 97.7 °F (36.5 °C), temperature source Temporal, resp. rate 18, height 5' 7\" (1.702 m), weight 98.7 kg (217 lb 9.5 oz), SpO2 93 %.  General - No acute distress.   HEENT - Normocephalic and atraumatic.  Cor - Regular rate and rhythm.  No murmur rubs or gallops.  Normal S1, S2.  Pulm - clear to auscultation bilaterally without any wheezing rhonchi or crackles.  Normal respiratory effort.  Abd - Soft, nontender, nondistended, normoactive bowel sounds  Ext - Warm without clubbing or cyanosis.  No edema.  Skin - No rashes or lesions.  Warm and dry.   Neuro - alert and oriented to self and place. Has baseline cognitive impairment. Strength and sensation grossly intact, moving all extremities spontaneously. No gross focal deficits    Discharge Medications:  Current Discharge Medication List      START taking these medications    Details   cefpodoxime (VANTIN) 200 MG tablet Take 1 tablet by mouth every 12 hours for 2 days.  Qty: 4 tablet, Refills: 0         CONTINUE these medications which have CHANGED    Details   potassium chloride (KLOR-CON SPRINKLE) 10 MEQ ER capsule Take 4 capsules by mouth daily. Dose: 4 capsules (= 40 mEq)  Qty: 120 capsule, Refills: 0         CONTINUE these medications which have NOT CHANGED    Details   acetaminophen (TYLENOL) 325 MG tablet Take 650 mg by mouth 3 times daily.      clotrimazole (LOTRIMIN) 1 % cream Apply 1 application topically 2 (two) times a day.      oxybutynin (DITROPAN XL) 15 MG 24 hr tablet Take 15 mg by mouth at bedtime.      dilTIAZem (Dilt-XR) 240 MG 24 hr capsule Take 240 mg by mouth daily.      meloxicam (MOBIC) 15 MG tablet Take 1 tablet by mouth daily.  Qty: 30 tablet, Refills: 3    Associated  Diagnoses: Primary osteoarthritis of left knee      famotidine (PEPCID) 20 MG tablet Take 20 mg by mouth daily.       losartan (COZAAR) 50 MG tablet Take 50 mg by mouth every evening.       aspirin 81 MG tablet Take 81 mg by mouth every evening.       Omega-3 Fatty Acids (FISH OIL) 1000 MG capsule Take 1 g by mouth 2 times daily.       loratadine (CLARITIN) 10 MG tablet Take 10 mg by mouth every evening.       tamsulosin (FLOMAX) 0.4 MG CAPS Take 0.4 mg by mouth daily after a meal.      triamterene-hydrochlorothiazide (MAXZIDE) 75-50 MG per tablet Take 1 tablet by mouth daily.      nystatin (MYCOSTATIN) powder Apply 1 application topically 2 times daily.       aluminum hydrox-magnesium hydrox-simethicone 400-400-40 MG/5ML suspension Take 10 mLs by mouth every 6 hours as needed (for acid indigestion).      magnesium hydroxide (MILK OF MAGNESIA) 400 MG/5ML suspension Take 30 mLs by mouth daily as needed for Constipation.         STOP taking these medications       amoxicillin-clavulanate (AUGMENTIN) 875-125 MG per tablet Comments:   Reason for Stopping:             ALLERGIES:  Patient has no known allergies.    Patient Discharge Instructions/Orders:   1. Activity:  activity as tolerated  2. May have pass with family, friends or staff with medications.  3. May have tetanus vaccine if not given in past 10 years.  4. May have annual flu vaccine.  5. May have pneumovax and influenza vaccine per nursing home protocol.  6. May have 2-step TB testing per facility policy.  7. May have alcoholic beverages for holidays/special events.  8. Acetaminophen 325 mg 2 tabs by mouth every 4 hours PRN pain or fever (max 4 grams/daily).  9. Bowel medications:  1. Milk of Magnesia 10 mL concentrate or 30 mL suspension by mouth daily PRN for constipation (Do not give for patients with creatinine >2.5).  2. Polyethylene glycol (Miralax) 17 G packet, dissolved in 8 ounces of water daily p.r.n. Constipation.  3. Bisacodyl suppository rectally  daily PRN constipation.  4. Tap water enema rectally daily PRN constipation.  10. May crush medications as needed unless contraindicated (exceptions per pharmacist).  11. May use liquid medications if necessary.  12. Call physician if SBP (systolic blood pressure) less than 85 or greater than 175.  13. Weight Bearing Status:  As tolerated  14. Evaluations:      Physical Therapy Evaluation and Treatment:  Yes      Occupational Therapy Evaluation and Treatment:  Yes      Speech Therapy Evaluation and Treatment:  No      MSW Evaluation and Treatment:  As needed  15.  Daily Weight - Call physician if increased >3 pounds in one day or >7 pounds in one week:  Edwin Louis MD  16.  Diet: Cardiac Diet  17.  Wound Care:  none needed      18.  Central Line/PICC Line Care:  Per protocol.  19.  Skin/Wound Care:  Per Wound Care Nurse recommendations and Nursing home protocols.   20.  I/O cath q 6 hours prn no void.  If post cath urine volume is >300 cc on two caths then notify provider.  21.  Oxygen:  Room Air  22.  Labs:  CBC, BMP in 1 week  23.  Blood Glucose monitoring: Fingerstick blood sugar PRN s/s (signs/symptoms) of hyperglycemia/hypoglycemia.    GLUCOSE CALL PARAMETERS:  1. If glucose greater than 450 at any time point.  2. If glucose greater than 400 on 2 consecutive glucose tests.  3. If glucose less than 80 and not responding to hypoglycemic treatment protocol.  4. If glucose less than 45 at any time point.    -  Status Inpatient    - I certify that post hospital care is required on daily basis for skilled nursing care or other rehab services that, as a practical matter, can only be provided in a SNF (skilled nursing facility) on an inpatient basis, and the SNF care is needed for a condition for which this individual received inpatient hospital care prior to transfer.     - Communicable Disease Status:   This patient has been screened for the presence of clinically apparent communicable disease that could be  transmitted to others, including tuberculosis within 90 days of transfer, or procedures have been ordered to treat and limit the spread of any communicable disease the patient may have been found to have.    Time spent on discharge and coordination of care was 35 minutes.  Discharge discussed with patient, nursing staff, case management    Follow-up:  No follow-up provider specified.  No future appointments.    Signed:  Rip Sommer MD  4/14/2022  10:02 AM        normal performance

## 2022-05-16 ENCOUNTER — APPOINTMENT (OUTPATIENT)
Dept: ORTHOPEDIC SURGERY | Facility: CLINIC | Age: 77
End: 2022-05-16
Payer: MEDICARE

## 2022-05-16 VITALS — DIASTOLIC BLOOD PRESSURE: 75 MMHG | SYSTOLIC BLOOD PRESSURE: 134 MMHG | HEART RATE: 73 BPM

## 2022-05-16 PROCEDURE — 20610 DRAIN/INJ JOINT/BURSA W/O US: CPT | Mod: RT

## 2022-05-16 RX ADMIN — LIDOCAINE HYDROCHLORIDE 3 %: 10 INJECTION, SOLUTION INFILTRATION; PERINEURAL at 00:00

## 2022-05-16 RX ADMIN — Medication 0 MG/3ML: at 00:00

## 2022-05-17 RX ORDER — HYALURONATE SOD, CROSS-LINKED 30 MG/3 ML
30 SYRINGE (ML) INTRAARTICULAR
Qty: 0 | Refills: 0 | Status: COMPLETED | OUTPATIENT
Start: 2022-05-16

## 2022-05-17 RX ORDER — LIDOCAINE HYDROCHLORIDE 10 MG/ML
1 INJECTION, SOLUTION INFILTRATION; PERINEURAL
Qty: 0 | Refills: 0 | Status: COMPLETED | OUTPATIENT
Start: 2022-05-16

## 2022-05-20 ENCOUNTER — APPOINTMENT (OUTPATIENT)
Dept: ORTHOPEDIC SURGERY | Facility: CLINIC | Age: 77
End: 2022-05-20

## 2022-09-08 NOTE — PATIENT PROFILE ADULT - ARRIVAL FROM
Home

## 2022-09-28 ENCOUNTER — APPOINTMENT (OUTPATIENT)
Dept: NEUROLOGY | Facility: CLINIC | Age: 77
End: 2022-09-28

## 2022-09-28 VITALS
TEMPERATURE: 97.4 F | WEIGHT: 200 LBS | SYSTOLIC BLOOD PRESSURE: 138 MMHG | DIASTOLIC BLOOD PRESSURE: 78 MMHG | HEART RATE: 62 BPM | OXYGEN SATURATION: 98 % | BODY MASS INDEX: 28.63 KG/M2 | HEIGHT: 70 IN

## 2022-09-28 DIAGNOSIS — G62.9 POLYNEUROPATHY, UNSPECIFIED: ICD-10-CM

## 2022-09-28 PROCEDURE — 99215 OFFICE O/P EST HI 40 MIN: CPT

## 2022-09-28 RX ORDER — ALPRAZOLAM 0.5 MG/1
0.5 TABLET ORAL
Qty: 1 | Refills: 0 | Status: DISCONTINUED | COMMUNITY
Start: 2020-05-01 | End: 2022-09-28

## 2022-09-28 RX ORDER — HYALURONATE SOD, CROSS-LINKED 30 MG/3 ML
30 SYRINGE (ML) INTRAARTICULAR
Qty: 1 | Refills: 0 | Status: DISCONTINUED | OUTPATIENT
Start: 2018-12-28 | End: 2022-09-28

## 2022-09-28 RX ORDER — FLUTICASONE PROPIONATE 50 UG/1
50 SPRAY, METERED NASAL DAILY
Qty: 1 | Refills: 0 | Status: DISCONTINUED | COMMUNITY
Start: 2019-10-02 | End: 2022-09-28

## 2022-09-28 RX ORDER — CHROMIUM 200 MCG
TABLET ORAL
Refills: 0 | Status: DISCONTINUED | COMMUNITY
End: 2022-09-28

## 2022-09-28 RX ORDER — TACROLIMUS 1 MG/G
OINTMENT TOPICAL
Refills: 0 | Status: DISCONTINUED | COMMUNITY
End: 2022-09-28

## 2022-09-28 RX ORDER — AZATHIOPRINE SODIUM 100 MG
100 VIAL (EA) INJECTION
Refills: 0 | Status: DISCONTINUED | COMMUNITY
End: 2022-09-28

## 2022-09-28 RX ORDER — MONTELUKAST SODIUM 4 MG/1
GRANULE ORAL
Refills: 0 | Status: DISCONTINUED | COMMUNITY
End: 2022-09-28

## 2022-09-28 RX ORDER — MONTELUKAST 10 MG/1
10 TABLET, FILM COATED ORAL DAILY
Qty: 30 | Refills: 3 | Status: DISCONTINUED | COMMUNITY
Start: 2019-10-02 | End: 2022-09-28

## 2022-09-28 RX ORDER — CYCLOBENZAPRINE HYDROCHLORIDE 5 MG/1
5 TABLET, FILM COATED ORAL 3 TIMES DAILY
Qty: 30 | Refills: 1 | Status: DISCONTINUED | COMMUNITY
Start: 2020-02-12 | End: 2022-09-28

## 2022-09-28 RX ORDER — MECLIZINE HYDROCHLORIDE 25 MG/1
25 TABLET ORAL
Refills: 0 | Status: DISCONTINUED | COMMUNITY
End: 2022-09-28

## 2022-09-28 RX ORDER — CYCLOBENZAPRINE HYDROCHLORIDE 5 MG/1
5 TABLET, FILM COATED ORAL 3 TIMES DAILY
Qty: 30 | Refills: 0 | Status: DISCONTINUED | COMMUNITY
Start: 2020-03-16 | End: 2022-09-28

## 2022-09-28 RX ORDER — CYCLOBENZAPRINE HYDROCHLORIDE 5 MG/1
5 TABLET, FILM COATED ORAL 3 TIMES DAILY
Qty: 30 | Refills: 1 | Status: DISCONTINUED | COMMUNITY
Start: 2020-03-20 | End: 2022-09-28

## 2022-09-28 RX ORDER — CALCIUM CARBONATE 500(1250)
TABLET,CHEWABLE ORAL
Refills: 0 | Status: DISCONTINUED | COMMUNITY
End: 2022-09-28

## 2022-09-28 RX ORDER — CYCLOSPORINE 25 MG/1
25 CAPSULE, LIQUID FILLED ORAL
Refills: 0 | Status: DISCONTINUED | COMMUNITY
End: 2022-09-28

## 2022-09-28 RX ORDER — CYCLOBENZAPRINE HYDROCHLORIDE 5 MG/1
5 TABLET, FILM COATED ORAL 3 TIMES DAILY
Qty: 21 | Refills: 0 | Status: DISCONTINUED | COMMUNITY
Start: 2020-02-03 | End: 2022-09-28

## 2022-09-28 RX ORDER — PAPAVER/PHENTOLAMINE/ALPROSTAD 150-5-50
VIAL (EA) INTRACAVERNOSAL
Refills: 0 | Status: DISCONTINUED | COMMUNITY
End: 2022-09-28

## 2022-09-28 RX ORDER — HYALURONATE SODIUM 20 MG/2 ML
20 SYRINGE (ML) INTRAARTICULAR
Qty: 1 | Refills: 0 | Status: DISCONTINUED | OUTPATIENT
Start: 2018-12-26 | End: 2022-09-28

## 2022-09-28 RX ORDER — ACETAMINOPHEN 500 MG/1
500 TABLET ORAL
Refills: 0 | Status: DISCONTINUED | COMMUNITY
End: 2022-09-28

## 2022-09-28 RX ORDER — SULFAMETHOXAZOLE/TRIMETHOPRIM 200-40MG/5
SUSPENSION, ORAL (FINAL DOSE FORM) ORAL
Refills: 0 | Status: DISCONTINUED | COMMUNITY
End: 2022-09-28

## 2022-09-28 RX ORDER — LIDOCAINE 5% 700 MG/1
5 PATCH TOPICAL
Qty: 30 | Refills: 0 | Status: DISCONTINUED | COMMUNITY
Start: 2018-12-13 | End: 2022-09-28

## 2022-09-29 NOTE — HISTORY OF PRESENT ILLNESS
[FreeTextEntry1] : Initial hx 5/2021\par Has been using a cane since knee replacement for long distance. \par Since spring 2020, noted dysequilibrium, mainly in the pool early on, then more dysequilibrium at all times, progressive over months, also associated with coldness/tingling in both feet that worsened over a similar timeframe. \par Saw Dr. Olvera, saw Dr. GOETZ. Saw Dr. Cruz and Dr. Wheeler for cervical myelopathy, got decompression in 1/2020. \par Coldness/tingling in the feet improved. Dysequilibrium also improved but was not doing very much, just walking around the house during covid.\par Now that he has started to go back to gym, dysequilibrium has recurred along with foot numbness, not on exertion, albeit to a lesser extent than prior to cervical surgery. Saw Dr. Wheeler, ordered for repeat MRI C spine and L spine which were stable, but notably MRI L spine showed severe central canal stenosis. \par Has BPH that causes urinary dysfunction, meds help. \par EMG 2021 read as consistent with severe axonal neuropathy. \par 1/2022 - suddenly warmer on the R side of body. Went to [facility], MRI showed thalamic stroke. Saw Dr. Roblero who increased statin and added asa81 to xarelto. Now seeing cardiologist. Started walking with a cane.\par 6/2022 - more activities and doing better. \par 7/2022 - after cardio one time, was unable to take a step across carpet to chair for 5 minutes and developed severe numbness in both legs x5-10 minutes. after 10 min, was able to walk. Improved but tingling and gait dysfunction did not completely return to baseline. Tingling lasted 2wks and largely resolved, but still recurs intermittently. \par \par \par Subj interval:\par \par 1/2022 - suddenly warmer on the R side of body. Went to [facility], MRI showed thalamic stroke. Saw Dr. Roblero who increased statin and added asa81 to xarelto. Now seeing cardiologist. Started walking with a cane.\par 6/2022 - more activities and doing better. \par 7/2022 - after cardio one time, was unable to take a step across carpet to chair for 5 minutes and developed severe numbness in both legs x5-10 minutes. after 10 min, was able to walk. Improved but tingling and gait dysfunction did not completely return to baseline. Tingling lasted 2wks and largely resolved, but still recurs intermittently. \par \par Reports episodes of "vibratory" sensation in both feet coming up the ankle.\par \par PMHX:\par CAD\par BL CTS s/p R surgery in remote past\par cervical stenosis s/p decompression\par lumbar stenosis\par BPH, s/p prostate ablation.\par ED\par severe eczema\par \par MEDS:\par methenamine\par xarelto\par celebrex\par zetia\par proscar\par vesicare\par cyclosporine 150\par latanoprost\par D3\par vitC\par folate\par miralax\par citrocel\par tums\par cosamin\par ramipril\par rosuvastatin\par sulfatrim\par asa\par bactrim\par \par \par O:\par \par AO3. Normally conversant. Follows commands, names, and repeats. Good attention.\par \par PERRL, VFF, EOMI, no nystagmus, face symmetric, TUP at midline.\par \par Motor: \par    R:  L:\par Del   5  5\par Bi   5  5\par Tri   5  5\par Wrist Extensors  5  5\par Finger abductors  5  5\par    5  5 \par \par HF   5  5\par KE   5  5\par KF   5  5\par DF   5  5\par PF   5  5\par \par Tone   R  L\par UE   0  0 \par LE   0  0\par \par Sensory  RUE  LUE  RLE LLE \par LT   +  +  +  +\par Vib   min  min  mod  mod\par JPS   +  +  +  +\par PP   +  +  +  +\par Temp   +  +  +  +\par \par Reflexes:\par    R  L  \par Biceps   2  2\par BR   2  2\par Triceps  \par Pat   2  2 \par AJ   abs  abs\par \par TOES   F  F\par \par \par Coordination:\par    R  L  \par FTN   0  0 \par JENARO   0  0\par HTS   \par \par Other     \par  \par Gait: bent forward at the back, cannot complete tandem, can stand on heels, trouble standing on toes on both sides. + romberg.\par \par   Assistance: none\par \par \par EMG 2021 - read as severe sensorimotor axonal neuropathy\par \par MRI L spine 2021 - multilevel central stenosis.\par \par \par AP: 76yo w/ foot numbness and imbalance that improved after cervical cord decompression in 1/2020 but has since recurred to a lesser degree. Exam notable for mildly decreased VBS in both feet, absent AJs, and +romberg. EMG read as consistent with severe axonal neuropathy. Neuropathy labs were unrevealing.\par \par Stroke in early 2022. Managed by Dr. Roblero.\par \par Abrupt worsening of sensory disturbance in both legs in 7/2022 of unclear etiology. Sensory disturbance is no doubt related to chronic neuropathy but it is not typical to worsen acutely. Other consideration is lumbar pathology given chronic lumbar stenosis. Less likely a new stroke but given abrupt worsening, will evaluate.\par \par Neuro exam unchanged, which is reassuring.\par \par all questions answered, education provided, management discussed.\par \par - check new brain and L spine MRI\par - f/u with derm for eczema. may be related to cyclosporine but pt followed up w/ dermatology and was told this is the only option.\par - again recommended to f/u with spine specialist for structural spine dz \par - neuromuscular evaluation for management or potentially further work up of neuropathy.\par - f/u w Dr. Roblero for post-stroke management

## 2022-10-07 ENCOUNTER — LABORATORY RESULT (OUTPATIENT)
Age: 77
End: 2022-10-07

## 2022-10-07 ENCOUNTER — APPOINTMENT (OUTPATIENT)
Dept: NEUROLOGY | Facility: CLINIC | Age: 77
End: 2022-10-07

## 2022-10-07 VITALS
OXYGEN SATURATION: 97 % | TEMPERATURE: 98 F | DIASTOLIC BLOOD PRESSURE: 73 MMHG | HEIGHT: 70 IN | HEART RATE: 61 BPM | WEIGHT: 200 LBS | SYSTOLIC BLOOD PRESSURE: 118 MMHG | BODY MASS INDEX: 28.63 KG/M2 | RESPIRATION RATE: 17 BRPM

## 2022-10-07 PROCEDURE — 99215 OFFICE O/P EST HI 40 MIN: CPT

## 2022-10-11 LAB
ERYTHROCYTE [SEDIMENTATION RATE] IN BLOOD BY WESTERGREN METHOD: 4 MM/HR
ESTIMATED AVERAGE GLUCOSE: 105 MG/DL
HBA1C MFR BLD HPLC: 5.3 %

## 2022-10-13 ENCOUNTER — APPOINTMENT (OUTPATIENT)
Dept: NEUROLOGY | Facility: CLINIC | Age: 77
End: 2022-10-13

## 2022-10-13 VITALS
WEIGHT: 200 LBS | TEMPERATURE: 97.6 F | SYSTOLIC BLOOD PRESSURE: 144 MMHG | HEART RATE: 62 BPM | OXYGEN SATURATION: 98 % | DIASTOLIC BLOOD PRESSURE: 81 MMHG | BODY MASS INDEX: 28.63 KG/M2 | HEIGHT: 70 IN

## 2022-10-13 LAB
ASIALO-GM1 ANTIBODIES, IGG/IGM: 20 IV
GD1A ANTIBODIES, IGG/IGM: 22 IV
GD1B ANTIBODIES, IGG/IGM: 13 IV
GM1 ANTIBODIES, IGG/IGM: 15 IV
GM2 ANTIBODIES, IGG/IGM: 14 IV
GQ1B ANTIBODIES, IGG/IGM: 7 IV
IGA 24H UR QL IFE: NORMAL

## 2022-10-13 PROCEDURE — 95886 MUSC TEST DONE W/N TEST COMP: CPT

## 2022-10-13 PROCEDURE — 95921 AUTONOMIC NRV PARASYM INERVJ: CPT

## 2022-10-13 PROCEDURE — 95910 NRV CNDJ TEST 7-8 STUDIES: CPT

## 2022-10-13 PROCEDURE — 95886 MUSC TEST DONE W/N TEST COMP: CPT | Mod: 59

## 2022-10-13 PROCEDURE — 93922 UPR/L XTREMITY ART 2 LEVELS: CPT

## 2022-10-13 PROCEDURE — 95923 AUTONOMIC NRV SYST FUNJ TEST: CPT

## 2022-10-13 NOTE — PROCEDURE
[FreeTextEntry1] : Autonomic Test [FreeTextEntry2] : Dizziness, palpitations, near falls or falls [FreeTextEntry4] : None [FreeTextEntry3] : Heart rate and beat to beat BP recorded during sitting, standing, valsalva maneuver, and QSART is reported.\par \par His results are as follows:\par \par Autonomic balance - -0.8\par E/I ratio - 1.3\par Valsalva Ratio - 1.32\par 30/15 Ratio - 1.32\par Systolic Pressure response to standing - 1mmHg\par \par LFa Resting - 1.42, Deep breathing, 0.82, Valsalva - 4.14, Standing - 0.9 - these are all within normal range.\par \par HFa - Resting - 1.24, Deep breathing, 2.63, Valsalva - 1.31, Standing - 0.55 - these are all within normal range as well.

## 2022-10-14 NOTE — ED PROVIDER NOTE - INTERNATIONAL TRAVEL
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 The fetus is, therefore, unlikely to be affected. Frank was very relieved to hear this fantastic news.      
No

## 2022-10-31 LAB
AMPA-R ABCBA: NEGATIVE
AMPHIPHYSIN IGG TITR SER IF: NEGATIVE TITER
CASPR2-IGG CBA, S: NEGATIVE
CV2 IGG TITR SER: NEGATIVE TITER
GABA-B ABCBA: NEGATIVE
GAD65 AB SER-MCNC: 0.05 NMOL/L
GLIAL NUC TYPE 1 AB TITR SER: NEGATIVE TITER
HU1 AB TITR SER: NEGATIVE TITER
HU2 AB TITR SER IF: NEGATIVE TITER
HU3 AB TITR SER: NEGATIVE TITER
IGLON5 IFA, S: NEGATIVE
IMMUNOLOGIST REVIEW: NORMAL
LGI1-IGG CBA, S: NEGATIVE
M PROTEIN SPEC IFE-MCNC: NORMAL
NIF IFA, S: NEGATIVE
NMDA-R ABCBA: NEGATIVE
PCA-1 AB TITR SER: NEGATIVE TITER
PCA-2 AB TITR SER: NEGATIVE TITER
PCA-TR AB TITR SER: NEGATIVE TITER
REFLEX ADDED: NORMAL

## 2022-11-01 LAB — HEREDITARY NEUROPATHY PANEL: ABNORMAL

## 2022-11-07 NOTE — DISCUSSION/SUMMARY
[FreeTextEntry1] : He has evidence of chronic peripheral neuropathy in the feet. EMG NCV by Dr Bryant confirmed this in May 2021. \par He has been using cyclosporine for many years. Cyclosporine is known to cause neuropathy. Advised to ask his prescribing physician to discontinue cyclosporine; will check other remaining causes that have not yet been done for neuropathy

## 2022-11-07 NOTE — HISTORY OF PRESENT ILLNESS
[FreeTextEntry1] : Progressive worsening of balance whose onset he links to spine surgery . He also has cold feet and tingling of the feet that makes it difficult to walk. He has noticed vertigo when he moves his head since the surgery

## 2022-11-24 NOTE — PROGRESS NOTE ADULT - SUBJECTIVE AND OBJECTIVE BOX
CHIEF COMPLAINT:Patient is a 77y old  Male who presents with a chief complaint of Stroke.pt appears comfortable.    	  REVIEW OF SYSTEMS:  CONSTITUTIONAL: No fever, weight loss, or fatigue  EYES: No eye pain, visual disturbances, or discharge  ENT:  No difficulty hearing, tinnitus, vertigo; No sinus or throat pain  NECK: No pain or stiffness  RESPIRATORY: No cough, wheezing, chills or hemoptysis; No Shortness of Breath  CARDIOVASCULAR: No chest pain, palpitations, passing out, dizziness, or leg swelling  GASTROINTESTINAL: No abdominal or epigastric pain. No nausea, vomiting, or hematemesis; No diarrhea or constipation. No melena or hematochezia.  GENITOURINARY: No dysuria, frequency, hematuria, or incontinence  NEUROLOGICAL: No headaches, memory loss, loss of strength, numbness, or tremors  SKIN: No itching, burning, rashes, or lesions   LYMPH Nodes: No enlarged glands  ENDOCRINE: No heat or cold intolerance; No hair loss  MUSCULOSKELETAL: No joint pain or swelling; No muscle, back, or extremity pain  PSYCHIATRIC: No depression, anxiety, mood swings, or difficulty sleeping  HEME/LYMPH: No easy bruising, or bleeding gums  ALLERGY AND IMMUNOLOGIC: No hives or eczema	      PHYSICAL EXAM:  T(C): 36.4 (01-20-22 @ 04:54), Max: 36.8 (01-19-22 @ 20:42)  HR: 55 (01-20-22 @ 10:39) (50 - 58)  BP: 134/80 (01-20-22 @ 10:39) (134/80 - 158/88)  RR: 17 (01-20-22 @ 04:54) (17 - 18)  SpO2: 94% (01-20-22 @ 10:39) (94% - 97%)  Wt(kg): --  I&O's Summary      Appearance: Normal	  HEENT:   Normal oral mucosa, PERRL, EOMI	  Lymphatic: No lymphadenopathy  Cardiovascular: Normal S1 S2, No JVD, No murmurs, No edema  Respiratory: Lungs clear to auscultation	  Psychiatry: A & O x 3, Mood & affect appropriate  Gastrointestinal:  Soft, Non-tender, + BS	  Skin: No rashes, No ecchymoses, No cyanosis	  Neurologic: Non-focal  Extremities: Normal range of motion, No clubbing, cyanosis or edema  Vascular: Peripheral pulses palpable 2+ bilaterally    MEDICATIONS  (STANDING):  ascorbic acid 500 milliGRAM(s) Oral daily  atorvastatin 40 milliGRAM(s) Oral at bedtime  calcium carbonate    500 mG (Tums) Chewable 2 Tablet(s) Chew daily  celecoxib 200 milliGRAM(s) Oral daily  cholecalciferol 2000 Unit(s) Oral daily  clopidogrel Tablet 75 milliGRAM(s) Oral daily  cycloSPORINE  (SandIMMUNE) 100 milliGRAM(s) Oral daily  finasteride 5 milliGRAM(s) Oral daily  folic acid 1 milliGRAM(s) Oral daily  latanoprost 0.005% Ophthalmic Solution 1 Drop(s) Both EYES at bedtime  pantoprazole    Tablet 40 milliGRAM(s) Oral before breakfast  polyethylene glycol 3350 17 Gram(s) Oral daily  rivaroxaban 10 milliGRAM(s) Oral with dinner        LABS:	 	    Troponin I, High Sensitivity Result: 7.2 ng/L (01-18 @ 17:50)                            12.7   5.43  )-----------( 171      ( 20 Jan 2022 07:04 )             37.8     01-20    140  |  109<H>  |  23<H>  ----------------------------<  87  4.2   |  28  |  0.92    Ca    9.0      20 Jan 2022 07:04  Phos  3.0     01-19  Mg     2.0     01-19    TPro  5.8<L>  /  Alb  2.8<L>  /  TBili  0.6  /  DBili  x   /  AST  13  /  ALT  19  /  AlkPhos  54  01-20      Lipid Profile: Cholesterol 124  LDL --  HDL 61  TG 49  Ldl calc 53  Ratio --      TSH: Thyroid Stimulating Hormone, Serum: 3.25 uU/mL (01-20 @ 07:05)  Thyroid Stimulating Hormone, Serum: 3.41 uU/mL (01-19 @ 06:52)      	      m< from: MR Head w/wo IV Cont (01.19.22 @ 19:35) >  ACC: [id number] EXAM:  MR BRAIN [facility]                          PROCEDURE DATE:  01/19/2022          INTERPRETATION:  INDICATION:    Sensory disturbance.  TECHNIQUE:  Multiplanar brain imaging was conducted. T1, T2 and FLAIR   imaging was performed.In addition, diffusion imaging, diffusion   coefficient assessment (ADC) and T2* was incorporated . Post contrast   imaging was performed. 7.5 cc Gadavist was administered.  COMPARISON EXAMINATION:  CT brain 1/18/2022    FINDINGS:    HEMISPHERES: An acute or subacute lacunar infarct is noted in the left   thalamus, with a small ovoid area of diffusion restriction.. Also noted   are chronic ischemic changes in both hemispheres with volume loss. There   is Virchow-Tyson space or dilatation in the basal ganglia, and to a   lesser extent, in the subcortical white matter. No microhemorrhage or   mass lesion is noted. With contrast administration, no enhancing brain   lesion is noted.  VENTRICLES:  midline and normal in size  POSTERIOR FOSSA:  No acute abnormality noted.  EXTRA-AXIAL:  No mass or collections are depicted.  VASCULATURE:  The major vessels and venous sinuses are grossly patent.  SINUSES AND MASTOIDS:  Mucosal thickening noted in the sinuses. Right   mastoid partial opacification is noted as well.  MISCELLANEOUS:  Degenerative changes are noted in the cervical spine and   there are artifacts suggesting prior ACDF in the mid cervical region.   This is consistent with the CT appearance and 5/7/2021.    IMPRESSION:    1)  A smallacute and/or subacute lacunar infarct is noted in the left   thalamus. Chronic ischemic changes are also noted in both hemispheres   with volume loss and involutional change. No enhancing brain lesion   identified. See above.  2)  inflammatory changes are noted in the paranasal sinuses and right   mastoid cells.      CATIA CANTRELL MD; Attending Radiologist  This document has been electronically signed. Jan 20 2022  9:39AM      h< from: Transthoracic Echocardiogram (01.19.22 @ 07:40) >  OBSERVATIONS:  Mitral Valve: Normal mitral valve. Mild mitral  regurgitation.  Aortic Root: Normal aortic root.  Aortic Valve: Normal trileaflet aortic valve. Mild aortic  regurgitation.  Left Atrium: Moderately dilated left atrium.  LA volume  index = 43 cc/m2.  Left Ventricle: Normal left ventricular systolic function.  Normal left ventricular internal dimensions and wall  thicknesses. Unable to adequately assess diastolic function  due to technical aspects of this study.  Right Heart: Normal right atrium. Normal right ventricular  size and function. There is mild tricuspid regurgitation.  There is trace pulmonic regurgitation.  Pericardium/PleuraNormal pericardium with no pericardial  effusion.  Hemodynamic: RA Pressure is 10 mm Hg. RV systolic pressure  is 31 mm Hg. Agitated saline injection was negative for  intracardiac shunt.       show

## 2022-11-28 ENCOUNTER — APPOINTMENT (OUTPATIENT)
Dept: ORTHOPEDIC SURGERY | Facility: CLINIC | Age: 77
End: 2022-11-28

## 2022-11-28 ENCOUNTER — NON-APPOINTMENT (OUTPATIENT)
Age: 77
End: 2022-11-28

## 2022-11-28 VITALS — DIASTOLIC BLOOD PRESSURE: 80 MMHG | SYSTOLIC BLOOD PRESSURE: 151 MMHG | HEART RATE: 51 BPM

## 2022-11-28 DIAGNOSIS — M25.562 PAIN IN LEFT KNEE: ICD-10-CM

## 2022-11-28 PROCEDURE — 20610 DRAIN/INJ JOINT/BURSA W/O US: CPT | Mod: LT

## 2022-11-28 RX ADMIN — Medication 0 MG/3ML: at 00:00

## 2022-11-28 RX ADMIN — Medication %: at 00:00

## 2022-11-29 RX ORDER — HYALURONATE SOD, CROSS-LINKED 30 MG/3 ML
30 SYRINGE (ML) INTRAARTICULAR
Refills: 0 | Status: COMPLETED | OUTPATIENT
Start: 2022-11-28

## 2022-11-29 RX ORDER — LIDOCAINE HYDROCHLORIDE 10 MG/ML
1 INJECTION, SOLUTION INFILTRATION; PERINEURAL
Refills: 0 | Status: COMPLETED | OUTPATIENT
Start: 2022-11-28

## 2022-12-13 NOTE — H&P ADULT - DOES THIS PATIENT HAVE A HISTORY OF OR HAS BEEN DX WITH HEART FAILURE?
Medication changes made today:  None      Tests Ordered:  BMP, Magnesium level AST, ALT      Test Results:  Nothing Pending      Understanding your instructions:  If you feel that things may have been explained in a way that you do not understand or did not receive easy to understand instruction, please contact me at [phone] and I would be more than happy to review your plan of care with you. Your healthcare is important to us and we want to make sure you understand your directions.    Our Electrophysiology Team will continue to collaborate and work very closely together to best meet your needs.    Thank you for choosing us to take care of your electrophysiology needs. It is a pleasure to work with you, and again, please contact us for any questions or concerns anytime.     
no

## 2022-12-23 ENCOUNTER — APPOINTMENT (OUTPATIENT)
Age: 77
End: 2022-12-23

## 2023-02-14 NOTE — PHYSICAL THERAPY INITIAL EVALUATION ADULT - ASSISTIVE DEVICE FOR TRANSFER: SIT/STAND, REHAB EVAL
rolling walker/straight cane

## 2023-03-24 NOTE — ED ADULT NURSE NOTE - NSFALLRSKASSESASSIST_ED_ALL_ED
Called patient to discuss the following:  Your mammogram result was reassuring.  No evidence of cancer based on radiologist report.  Radiologist recommended to repeat mammogram in 1 year.    Patient verbalized understanding and had no further questions.     yes

## 2023-05-30 ENCOUNTER — APPOINTMENT (OUTPATIENT)
Dept: ORTHOPEDIC SURGERY | Facility: CLINIC | Age: 78
End: 2023-05-30
Payer: MEDICARE

## 2023-05-30 VITALS
WEIGHT: 200 LBS | BODY MASS INDEX: 28.63 KG/M2 | HEART RATE: 62 BPM | DIASTOLIC BLOOD PRESSURE: 80 MMHG | HEIGHT: 70 IN | SYSTOLIC BLOOD PRESSURE: 140 MMHG

## 2023-05-30 PROCEDURE — 20610 DRAIN/INJ JOINT/BURSA W/O US: CPT | Mod: RT

## 2023-05-30 PROCEDURE — 99212 OFFICE O/P EST SF 10 MIN: CPT | Mod: 57

## 2023-05-30 RX ADMIN — LIDOCAINE HYDROCHLORIDE 3 %: 10 INJECTION, SOLUTION INFILTRATION; PERINEURAL at 00:00

## 2023-05-30 RX ADMIN — Medication 0 MG/3ML: at 00:00

## 2023-05-30 NOTE — DISCUSSION/SUMMARY
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  We gave him a gel 1 injection today to temporize his symptoms even further.  He was thoroughly educated on postinjection expectations.  Patient may return for cortisone injection should he wish to do so in the interim, he will continue with ice, anti-inflammatories and other conservative treatment modalities.  All of his questions were answered to his satisfaction.  He will follow-up with us on appearing basis.

## 2023-05-30 NOTE — REASON FOR VISIT
[Follow-Up Visit] : a follow-up visit for [Knee Pain] : knee pain [FreeTextEntry2] : Right knee DJD, for Gel one injection

## 2023-05-30 NOTE — HISTORY OF PRESENT ILLNESS
[de-identified] : The patient is a 78-year-old gentleman well-known to this office.  He has an established diagnosis of DJD to the right knee affecting his activities of daily living.  Patient has difficulty standing for long periods of time, walking for long periods of time, and negotiating stairs.  Patient was last seen November for a gel injection which seemed to help him quite a bit.  He is here for gel 1 injection to the right knee to temporize his symptoms for DJD. [Worsening] : worsening [9] : a current pain level of 9/10 [7] : an average pain level of 7/10 [4] : a minimum pain level of 4/10 [10] : a maximum pain level of 10/10 [Bending] : worsened by bending [Lifting] : worsened by lifting [Sitting] : worsened by sitting [Running] : worsened by running [Walking] : worsened by walking [Knee Flexion] : worsened with knee flexion [Acetaminophen] : relieved by acetaminophen [Exercise Regimen] : relieved by exercise regimen [Heat] : relieved by heat [Ice] : relieved by ice [NSAIDs] : relieved by nonsteroidal anti-inflammatory drugs [Physical Therapy] : relieved by physical therapy [Rest] : relieved by rest [Ataxia] : no ataxia [Incontinence] : no incontinence [Loss of Dexterity] : good dexterity [Urinary Ret.] : no urinary retention

## 2023-05-30 NOTE — PHYSICAL EXAM
[Antalgic] : antalgic [UE/LE] : Sensory: Intact in bilateral upper & lower extremities [ALL] : dorsalis pedis, posterior tibial, femoral, popliteal, and radial 2+ and symmetric bilaterally [Normal RUE] : Right Upper Extremity: No scars, rashes, lesions, ulcers, skin intact [Normal LUE] : Left Upper Extremity: No scars, rashes, lesions, ulcers, skin intact [Normal RLE] : Right Lower Extremity: No scars, rashes, lesions, ulcers, skin intact [Normal LLE] : Left Lower Extremity: No scars, rashes, lesions, ulcers, skin intact [Normal] : No costovertebral angle tenderness and no spinal tenderness [de-identified] : Skin is clean, dry, intact to the right knee.  Positive medial joint line tenderness.  Positive medial Steinmann test.  Less than 5 degree laxity with varus valgus stresses.  Negative anteroposterior drawer.  No extension lag.  No flexion contractures.  Range of motion 0-1 20 with pain on further flexion crepitus at the medial femoral-tibial compartment patellofemoral joint. [de-identified] : Radiographs were deferred at this visit but at the last visit show severe tricompartmental DJD to the right knee with multiple subchondral cyst, periarticular osteophytes, significant joint space narrowing at the patellofemoral joint medial femoral-tibial compartment.

## 2023-05-30 NOTE — PROCEDURE
[Injection] : Injection [Right] : of the right [Knee Joint] : knee joint [Effusion] : Effusion [Osteoarthritis] : Osteoarthritis [Inflammation] : Inflammation [Diagnostic] : Diagnostic [Joint Pain] : Joint pain [Patient] : patient [Risk] : risk [Benefits] : benefits [Alternatives] : alternatives [Bleeding] : bleeding [Infection] : infection [Allergic Reaction] : allergic reaction [Verbal Consent Obtained] : verbal consent was obtained prior to the procedure [Ethyl Chloride Spray] : ethyl chloride spray was used as a topical anesthetic [Medial] : medial [18] : an 18-gauge [1% Lidocaine___(mL)] : [unfilled] mL of 1% Lidocaine [Bandage Applied] : a bandage [Tolerated Well] : The patient tolerated the procedure well [None] : none [PRN] : as needed [FreeTextEntry1] : Chlorhexidine [FreeTextEntry8] : Gel 1

## 2023-07-18 NOTE — PACU DISCHARGE NOTE - COMMENTS
pt taken to lobby via wheelchair, d/c home with family Price Per Unit Or Per Cc In $ (Use Numbers Only, No Special Characters Or $): 13

## 2023-07-27 ENCOUNTER — APPOINTMENT (OUTPATIENT)
Dept: ORTHOPEDIC SURGERY | Facility: CLINIC | Age: 78
End: 2023-07-27
Payer: MEDICARE

## 2023-07-27 VITALS — BODY MASS INDEX: 27.3 KG/M2 | WEIGHT: 195 LBS | HEIGHT: 71 IN

## 2023-07-27 PROCEDURE — 20610 DRAIN/INJ JOINT/BURSA W/O US: CPT | Mod: RT

## 2023-07-27 PROCEDURE — 99214 OFFICE O/P EST MOD 30 MIN: CPT | Mod: 25

## 2023-07-27 RX ORDER — ROSUVASTATIN CALCIUM 5 MG/1
5 TABLET, FILM COATED ORAL
Refills: 0 | Status: ACTIVE | COMMUNITY

## 2023-07-27 RX ORDER — ASCORBIC ACID 500 MG
TABLET ORAL
Refills: 0 | Status: ACTIVE | COMMUNITY

## 2023-07-27 RX ORDER — METHENAMINE MANDELATE 1000 MG/1
1 TABLET, FILM COATED ORAL
Refills: 0 | Status: ACTIVE | COMMUNITY

## 2023-07-27 RX ORDER — FINASTERIDE 5 MG/1
5 TABLET, FILM COATED ORAL
Refills: 0 | Status: ACTIVE | COMMUNITY

## 2023-07-27 RX ORDER — RIVAROXABAN 10 MG/1
10 TABLET, FILM COATED ORAL
Refills: 0 | Status: ACTIVE | COMMUNITY

## 2023-07-27 RX ORDER — CELECOXIB 50 MG/1
CAPSULE ORAL
Refills: 0 | Status: ACTIVE | COMMUNITY

## 2023-07-27 RX ORDER — GLUCOSAM/CHOND/GLYCOSAMINOG/C 500-400 MG
CAPSULE ORAL
Refills: 0 | Status: ACTIVE | COMMUNITY

## 2023-07-27 RX ORDER — OXYBUTYNIN CHLORIDE 10 MG/1
10 TABLET, EXTENDED RELEASE ORAL
Refills: 0 | Status: ACTIVE | COMMUNITY

## 2023-07-27 RX ORDER — RAMIPRIL 5 MG/1
5 CAPSULE ORAL
Refills: 0 | Status: ACTIVE | COMMUNITY

## 2023-09-14 ENCOUNTER — APPOINTMENT (OUTPATIENT)
Dept: ORTHOPEDIC SURGERY | Facility: CLINIC | Age: 78
End: 2023-09-14
Payer: MEDICARE

## 2023-09-14 VITALS — RESPIRATION RATE: 16 BRPM | HEIGHT: 71 IN | BODY MASS INDEX: 27.3 KG/M2 | WEIGHT: 195 LBS

## 2023-09-14 PROCEDURE — 99212 OFFICE O/P EST SF 10 MIN: CPT

## 2023-10-20 RX ORDER — HYALURONATE SOD, CROSS-LINKED 30 MG/3 ML
30 SYRINGE (ML) INTRAARTICULAR
Refills: 0 | Status: COMPLETED | OUTPATIENT
Start: 2023-05-30

## 2023-10-20 RX ORDER — LIDOCAINE HYDROCHLORIDE 10 MG/ML
1 INJECTION, SOLUTION INFILTRATION; PERINEURAL
Refills: 0 | Status: COMPLETED | OUTPATIENT
Start: 2023-05-30

## 2023-11-16 ENCOUNTER — APPOINTMENT (OUTPATIENT)
Dept: ORTHOPEDIC SURGERY | Facility: CLINIC | Age: 78
End: 2023-11-16
Payer: MEDICARE

## 2023-11-16 VITALS — HEIGHT: 71 IN | WEIGHT: 195 LBS | BODY MASS INDEX: 27.3 KG/M2

## 2023-11-16 PROCEDURE — 99212 OFFICE O/P EST SF 10 MIN: CPT | Mod: 25

## 2023-11-16 PROCEDURE — 20610 DRAIN/INJ JOINT/BURSA W/O US: CPT | Mod: RT

## 2023-11-17 NOTE — ED PROVIDER NOTE - NSCAREINITIATED _GEN_ER
Benefits, risks, and possible complications of procedure explained to patient/caregiver who verbalized understanding and gave written consent. Tylor Mcdaniel(Attending) no

## 2023-12-01 ENCOUNTER — APPOINTMENT (OUTPATIENT)
Dept: ORTHOPEDIC SURGERY | Facility: CLINIC | Age: 78
End: 2023-12-01
Payer: MEDICARE

## 2023-12-01 VITALS — HEART RATE: 52 BPM | SYSTOLIC BLOOD PRESSURE: 138 MMHG | DIASTOLIC BLOOD PRESSURE: 72 MMHG

## 2023-12-01 PROCEDURE — 20610 DRAIN/INJ JOINT/BURSA W/O US: CPT | Mod: RT

## 2023-12-01 RX ADMIN — LIDOCAINE HYDROCHLORIDE 3 %: 10 INJECTION, SOLUTION EPIDURAL; INFILTRATION; INTRACAUDAL; PERINEURAL at 00:00

## 2023-12-01 RX ADMIN — Medication 0 MG/3ML: at 00:00

## 2023-12-11 RX ORDER — LIDOCAINE HYDROCHLORIDE 10 MG/ML
1 INJECTION, SOLUTION INFILTRATION; PERINEURAL
Qty: 0 | Refills: 0 | Status: COMPLETED | OUTPATIENT
Start: 2023-12-01

## 2024-02-01 ENCOUNTER — EMERGENCY (EMERGENCY)
Facility: HOSPITAL | Age: 79
LOS: 1 days | Discharge: HOME | End: 2024-02-01
Attending: STUDENT IN AN ORGANIZED HEALTH CARE EDUCATION/TRAINING PROGRAM | Admitting: STUDENT IN AN ORGANIZED HEALTH CARE EDUCATION/TRAINING PROGRAM
Payer: MEDICARE

## 2024-02-01 VITALS
OXYGEN SATURATION: 98 % | HEART RATE: 78 BPM | RESPIRATION RATE: 18 BRPM | DIASTOLIC BLOOD PRESSURE: 75 MMHG | SYSTOLIC BLOOD PRESSURE: 168 MMHG | TEMPERATURE: 98 F

## 2024-02-01 VITALS
DIASTOLIC BLOOD PRESSURE: 85 MMHG | OXYGEN SATURATION: 99 % | SYSTOLIC BLOOD PRESSURE: 195 MMHG | TEMPERATURE: 98 F | HEART RATE: 69 BPM | RESPIRATION RATE: 19 BRPM

## 2024-02-01 DIAGNOSIS — N63.0 UNSPECIFIED LUMP IN UNSPECIFIED BREAST: Chronic | ICD-10-CM

## 2024-02-01 DIAGNOSIS — Z41.9 ENCOUNTER FOR PROCEDURE FOR PURPOSES OTHER THAN REMEDYING HEALTH STATE, UNSPECIFIED: Chronic | ICD-10-CM

## 2024-02-01 DIAGNOSIS — Z98.890 OTHER SPECIFIED POSTPROCEDURAL STATES: Chronic | ICD-10-CM

## 2024-02-01 DIAGNOSIS — Z98.52 VASECTOMY STATUS: Chronic | ICD-10-CM

## 2024-02-01 DIAGNOSIS — Z98.89 OTHER SPECIFIED POSTPROCEDURAL STATES: Chronic | ICD-10-CM

## 2024-02-01 DIAGNOSIS — Z96.652 PRESENCE OF LEFT ARTIFICIAL KNEE JOINT: Chronic | ICD-10-CM

## 2024-02-01 DIAGNOSIS — Z95.5 PRESENCE OF CORONARY ANGIOPLASTY IMPLANT AND GRAFT: Chronic | ICD-10-CM

## 2024-02-01 LAB
ALBUMIN SERPL ELPH-MCNC: 3.9 G/DL — SIGNIFICANT CHANGE UP (ref 3.3–5)
ALP SERPL-CCNC: 57 U/L — SIGNIFICANT CHANGE UP (ref 40–120)
ALT FLD-CCNC: 17 U/L — SIGNIFICANT CHANGE UP (ref 4–41)
ANION GAP SERPL CALC-SCNC: 13 MMOL/L — SIGNIFICANT CHANGE UP (ref 7–14)
ANISOCYTOSIS BLD QL: SLIGHT — SIGNIFICANT CHANGE UP
APTT BLD: 41.3 SEC — HIGH (ref 24.5–35.6)
AST SERPL-CCNC: 28 U/L — SIGNIFICANT CHANGE UP (ref 4–40)
BASOPHILS # BLD AUTO: 0 K/UL — SIGNIFICANT CHANGE UP (ref 0–0.2)
BASOPHILS NFR BLD AUTO: 0 % — SIGNIFICANT CHANGE UP (ref 0–2)
BILIRUB SERPL-MCNC: 0.7 MG/DL — SIGNIFICANT CHANGE UP (ref 0.2–1.2)
BUN SERPL-MCNC: 26 MG/DL — HIGH (ref 7–23)
CALCIUM SERPL-MCNC: 10.3 MG/DL — SIGNIFICANT CHANGE UP (ref 8.4–10.5)
CHLORIDE SERPL-SCNC: 102 MMOL/L — SIGNIFICANT CHANGE UP (ref 98–107)
CO2 SERPL-SCNC: 22 MMOL/L — SIGNIFICANT CHANGE UP (ref 22–31)
CREAT SERPL-MCNC: 0.95 MG/DL — SIGNIFICANT CHANGE UP (ref 0.5–1.3)
EGFR: 81 ML/MIN/1.73M2 — SIGNIFICANT CHANGE UP
EOSINOPHIL # BLD AUTO: 0.06 K/UL — SIGNIFICANT CHANGE UP (ref 0–0.5)
EOSINOPHIL NFR BLD AUTO: 0.9 % — SIGNIFICANT CHANGE UP (ref 0–6)
GLUCOSE SERPL-MCNC: 114 MG/DL — HIGH (ref 70–99)
HCT VFR BLD CALC: 40.9 % — SIGNIFICANT CHANGE UP (ref 39–50)
HGB BLD-MCNC: 13.7 G/DL — SIGNIFICANT CHANGE UP (ref 13–17)
IANC: 6.24 K/UL — SIGNIFICANT CHANGE UP (ref 1.8–7.4)
INR BLD: 1.67 RATIO — HIGH (ref 0.85–1.18)
LYMPHOCYTES # BLD AUTO: 0.12 K/UL — LOW (ref 1–3.3)
LYMPHOCYTES # BLD AUTO: 1.7 % — LOW (ref 13–44)
MACROCYTES BLD QL: SLIGHT — SIGNIFICANT CHANGE UP
MCHC RBC-ENTMCNC: 32.8 PG — SIGNIFICANT CHANGE UP (ref 27–34)
MCHC RBC-ENTMCNC: 33.5 GM/DL — SIGNIFICANT CHANGE UP (ref 32–36)
MCV RBC AUTO: 97.8 FL — SIGNIFICANT CHANGE UP (ref 80–100)
MONOCYTES # BLD AUTO: 0.12 K/UL — SIGNIFICANT CHANGE UP (ref 0–0.9)
MONOCYTES NFR BLD AUTO: 1.7 % — LOW (ref 2–14)
NEUTROPHILS # BLD AUTO: 6.61 K/UL — SIGNIFICANT CHANGE UP (ref 1.8–7.4)
NEUTROPHILS NFR BLD AUTO: 95.7 % — HIGH (ref 43–77)
OVALOCYTES BLD QL SMEAR: SLIGHT — SIGNIFICANT CHANGE UP
PLAT MORPH BLD: NORMAL — SIGNIFICANT CHANGE UP
PLATELET # BLD AUTO: 196 K/UL — SIGNIFICANT CHANGE UP (ref 150–400)
PLATELET COUNT - ESTIMATE: NORMAL — SIGNIFICANT CHANGE UP
POTASSIUM SERPL-MCNC: 4.5 MMOL/L — SIGNIFICANT CHANGE UP (ref 3.5–5.3)
POTASSIUM SERPL-SCNC: 4.5 MMOL/L — SIGNIFICANT CHANGE UP (ref 3.5–5.3)
PROT SERPL-MCNC: 6.6 G/DL — SIGNIFICANT CHANGE UP (ref 6–8.3)
PROTHROM AB SERPL-ACNC: 18.4 SEC — HIGH (ref 9.5–13)
RBC # BLD: 4.18 M/UL — LOW (ref 4.2–5.8)
RBC # FLD: 12.7 % — SIGNIFICANT CHANGE UP (ref 10.3–14.5)
RBC BLD AUTO: ABNORMAL
SODIUM SERPL-SCNC: 137 MMOL/L — SIGNIFICANT CHANGE UP (ref 135–145)
TROPONIN T, HIGH SENSITIVITY RESULT: 25 NG/L — SIGNIFICANT CHANGE UP
TROPONIN T, HIGH SENSITIVITY RESULT: 29 NG/L — SIGNIFICANT CHANGE UP
WBC # BLD: 6.91 K/UL — SIGNIFICANT CHANGE UP (ref 3.8–10.5)
WBC # FLD AUTO: 6.91 K/UL — SIGNIFICANT CHANGE UP (ref 3.8–10.5)

## 2024-02-01 PROCEDURE — 93010 ELECTROCARDIOGRAM REPORT: CPT

## 2024-02-01 PROCEDURE — 73060 X-RAY EXAM OF HUMERUS: CPT | Mod: 26,RT

## 2024-02-01 PROCEDURE — 99285 EMERGENCY DEPT VISIT HI MDM: CPT | Mod: GC

## 2024-02-01 PROCEDURE — 73030 X-RAY EXAM OF SHOULDER: CPT | Mod: 26,RT

## 2024-02-01 PROCEDURE — 71045 X-RAY EXAM CHEST 1 VIEW: CPT | Mod: 26

## 2024-02-01 PROCEDURE — 73020 X-RAY EXAM OF SHOULDER: CPT | Mod: 26,XE,RT

## 2024-02-01 PROCEDURE — 73200 CT UPPER EXTREMITY W/O DYE: CPT | Mod: 26,RT,MA

## 2024-02-01 RX ORDER — LIDOCAINE 4 G/100G
1 CREAM TOPICAL
Qty: 3 | Refills: 0
Start: 2024-02-01 | End: 2024-02-07

## 2024-02-01 RX ORDER — ONDANSETRON 8 MG/1
4 TABLET, FILM COATED ORAL ONCE
Refills: 0 | Status: COMPLETED | OUTPATIENT
Start: 2024-02-01 | End: 2024-02-01

## 2024-02-01 RX ORDER — MORPHINE SULFATE 50 MG/1
4 CAPSULE, EXTENDED RELEASE ORAL ONCE
Refills: 0 | Status: DISCONTINUED | OUTPATIENT
Start: 2024-02-01 | End: 2024-02-01

## 2024-02-01 RX ADMIN — ONDANSETRON 4 MILLIGRAM(S): 8 TABLET, FILM COATED ORAL at 16:46

## 2024-02-01 RX ADMIN — MORPHINE SULFATE 4 MILLIGRAM(S): 50 CAPSULE, EXTENDED RELEASE ORAL at 16:46

## 2024-02-01 NOTE — ED PROVIDER NOTE - PROGRESS NOTE DETAILS
Dorys Velazquez MD, PGY2:  signed out to me pending XR and CT of the shoulder for eval of soft tissue vs bony abnormality causing severe pain. Dorys Velazquez MD, PGY2:  Reviewed results of imaging, CT neck, shoulder shows a large effusion with osteoarthritic changes in the glenohumeral joint.  X-rays show no displacement or fracture.  Reassessed at bedside, patient reports that his pain is improved to 2 out of 10 in severity at this time.  Informed patient of all findings on CT imaging including the right shoulder effusion, moderate glenohumeral joint arthritic arthritis.  Patient reports that he has close orthopedic follow-up and can see them in the upcoming days.  Patient reports that he ambulates with a cane, uses his left arm for ambulation.  Patient does report though that he has had some issues with his balance prior to his injury today and cannot ambulate with a walker.  Agrees with plan for ambulation trial with cane, if patient is unable to ambulate or is unsteady on his feet, will admit for inpatient PT management. Dorys Velazquez MD, PGY2:  Reviewed results of imaging, CT neck, shoulder shows a large effusion with osteoarthritic changes in the glenohumeral joint.  X-rays show no displacement or fracture.  No infectious symptoms.  Labs showed normal white blood cell count, no electrolyte derangements, troponin nonischemic. Reassessed at bedside, patient reports that his pain is improved to 2 out of 10 in severity at this time.  Informed patient of all findings on CT imaging including the right shoulder effusion, moderate glenohumeral joint arthritic arthritis.  Patient reports that he has close orthopedic follow-up and can see them in the upcoming days.  Patient reports that he ambulates with a cane, uses his left arm for ambulation.  Patient does report though that he has had some issues with his balance prior to his injury today and cannot ambulate with a walker.  Agrees with plan for ambulation trial with cane, if patient is unable to ambulate or is unsteady on his feet, will admit for inpatient PT management. Dorys Velazquez MD, PGY2:  Patient reexamined at bedside, right shoulder is nonerythematous, with no increased warmth, no longer tender to palpation, patient is afebrile.  Patient ambulated with cane, patient ambulated without any difficulty, reports feeling well with no dizziness, states while walking he feels balanced, does not report any new gait instability.  Had shared decision-making with patient, patient would like discharge with over-the-counter pain medications and outpatient Ortho follow-up either tomorrow or Monday.  Patient counseled to return to the emergency room immediately if symptoms worsen, pain is not controlled, or if patient has difficulty walking/falls.  Patient verbalized understanding agrees with plan.  Patient verbalized understanding of all results, outpatient follow-up instructions, and return precautions.  All questions were answered at this time.  Patient is ready for discharge.

## 2024-02-01 NOTE — ED ADULT NURSE NOTE - NSFALLRISKINTERV_ED_ALL_ED

## 2024-02-01 NOTE — ED ADULT NURSE REASSESSMENT NOTE - NS ED NURSE REASSESS COMMENT FT1
report received from RN Jennifer. pt remains at baseline mental status A&OX4, RR even unlabored completing full sentences. pt resting in stretcher comfortably at this time, no new complaints offered. NAD noted. stretcher lowest position siderails up safety measures in place. pt awaiting MD reassessment at this time.

## 2024-02-01 NOTE — ED ADULT NURSE NOTE - OBJECTIVE STATEMENT
Facilitator RN: Received pt to bed 20, A+Ox4, ambulatory w/cane at baseline. C/O right shoulder pain post injury while attempting to stand from a seated position. hx of right rotator cuff tear. pt reports difficulty walking due to neuropathy and need of right knee replacement. Pt denies any chest pain, SOB, headache, dizziness, N+V, diarrhea, fever, chills. 20G to LAC, Labs sent, Medicated as per Provider orders, plan of care ongoing, no further concerns as of present, patient expresses no other needs at this time, call light within reach.

## 2024-02-01 NOTE — ED ADULT TRIAGE NOTE - CHIEF COMPLAINT QUOTE
pt has hx of right rotator cuff tear. pt states he injured his right shoulder by putting pressure while  attempting to push himself  up. pt c/o nauseous and lightheadedness due to "extreme pain".     PHx 5 stents 2012, CAD, arthritis

## 2024-02-01 NOTE — ED PROVIDER NOTE - ATTENDING CONTRIBUTION TO CARE
I performed a history and physical exam of the patient and discussed their management with the resident/fellow/ACP/student. I have reviewed the resident/fellow/ACP/student note and agree with the documented findings and plan of care, except as noted. I have personally performed a substantive portion of the visit including all aspects of the medical decision making. My medical decision making and observations are found above. Please refer to any progress notes for updates on clinical course.    79-year-old male with CAD s/p 5 stents, DVT on xarelto, HTN, BPH, carotid stenosis, dysequilibrium s/p ACDF C5-C7, PARTIAL CORPECTOMY C6 presenting with right shoulder pain.  Patient reports he was lifting himself out of a chair and had sudden onset of right shoulder pain.  Has known history of right rotator cuff tear he follows with outpatient doctor with possible planned surgery.  Denies any LOC, fall/trauma, chest pain, shortness of breath, focal weakness, numbness/tingling, palpitations, diaphoresis, leg swelling/pain, recent travel/bedbound nature, hemoptysis.  Adherent with Xarelto.    Gen: WDWN, uncomfortable appearing   HEENT: No nasal discharge, mucous membranes moist   CV: RRR, +S1/S2, no M/R/G, equal b/l radial pulses 2+  Resp: CTAB, no W/R/R, SPO2 >95% on RA, no increased WOB   GI: Abdomen soft non-distended, NTTP, no masses/organomegaly   MSK/Skin: Right anterior shoulder TTP with no gross deformity; palpable 2+ radial pulses, soft compartments, gross sensation intact and ranging shoulder/elbow/wrist   Neuro: A&Ox4, moving all 4 extremities spontaneously, gross sensation intact in UE and LE BL  Psych: appropriate mood    MDM:   79-year-old male with CAD s/p 5 stents, DVT on xarelto, HTN, BPH, carotid stenosis, dysequilibrium s/p ACDF C5-C7, PARTIAL CORPECTOMY C6, known right rotator cuff tear presenting with right shoulder pain After lifting himself up from chair, no chest pain/shortness of breath, EKG nonischemic, PAC but otherwise similar to prior, uncomfortable appearing with right anterior TTP, neurovascularly intact with soft compartments, adherent with Xarelto.  Exam/history concerning for but not limited to acute on chronic rotator cuff tear versus shoulder dislocation/fracture or suspicion for atypical ACS versus PTX.  Will evaluate with basic labs, x-ray, possible CT given level of pain, cardiac enzymes and continue to reassess

## 2024-02-01 NOTE — ED PROVIDER NOTE - NSFOLLOWUPINSTRUCTIONS_ED_ALL_ED_FT
You were seen in the emergency room for right shoulder pain at this time your CAT scan shows a large effusion which is a fluid collection in your right shoulder with moderate joint osteoarthritis of the right shoulder.    Your x-ray shows no new fractures or dislocations.  Your labs are within normal limits.    You were given pain medication with control of your symptoms.  We recommend that you continue taking 1000 mg of Tylenol every 6 hours in addition to your usual anti-inflammatories for further pain control.  We also sent a prescription for lidocaine patches to your pharmacy.    Please apply ice and hot packs to your shoulder every 20 minutes for management of your pain and swelling.    Please follow-up with your orthopedist within the next 1-2 business days.    Please return to the emergency room if you develop difficulty walking, feeling off balance, increased pain, dizziness, falling, chest pain or shortness of breath, fever.

## 2024-02-01 NOTE — ED PROVIDER NOTE - PATIENT PORTAL LINK FT
You can access the FollowMyHealth Patient Portal offered by [facility] by registering at the following website: [email or web address]. By joining [facility]’s FollowMyHealth portal, you will also be able to view your health information using other applications (apps) compatible with our system.

## 2024-02-06 ENCOUNTER — APPOINTMENT (OUTPATIENT)
Dept: ORTHOPEDIC SURGERY | Facility: CLINIC | Age: 79
End: 2024-02-06
Payer: MEDICARE

## 2024-02-06 VITALS — WEIGHT: 195 LBS | HEIGHT: 71 IN | BODY MASS INDEX: 27.3 KG/M2

## 2024-02-06 PROCEDURE — 99212 OFFICE O/P EST SF 10 MIN: CPT

## 2024-02-07 NOTE — ED POST DISCHARGE NOTE - REASON FOR FOLLOW-UP
Other Pharmacy called about Lidocaine instructions which say to appy to area 2xs daily. Switched instruction verbally to apply patch 12hrs on 12hrs off.

## 2024-03-05 ENCOUNTER — RESULT REVIEW (OUTPATIENT)
Age: 79
End: 2024-03-05

## 2024-03-05 ENCOUNTER — OUTPATIENT (OUTPATIENT)
Dept: OUTPATIENT SERVICES | Facility: HOSPITAL | Age: 79
LOS: 1 days | End: 2024-03-05
Payer: MEDICARE

## 2024-03-05 ENCOUNTER — APPOINTMENT (OUTPATIENT)
Dept: ORTHOPEDIC SURGERY | Facility: CLINIC | Age: 79
End: 2024-03-05
Payer: MEDICARE

## 2024-03-05 VITALS
DIASTOLIC BLOOD PRESSURE: 71 MMHG | BODY MASS INDEX: 27.3 KG/M2 | OXYGEN SATURATION: 97 % | WEIGHT: 195 LBS | SYSTOLIC BLOOD PRESSURE: 119 MMHG | HEART RATE: 69 BPM | HEIGHT: 71 IN

## 2024-03-05 DIAGNOSIS — Z41.9 ENCOUNTER FOR PROCEDURE FOR PURPOSES OTHER THAN REMEDYING HEALTH STATE, UNSPECIFIED: Chronic | ICD-10-CM

## 2024-03-05 DIAGNOSIS — Z98.890 OTHER SPECIFIED POSTPROCEDURAL STATES: Chronic | ICD-10-CM

## 2024-03-05 DIAGNOSIS — Z96.652 PRESENCE OF LEFT ARTIFICIAL KNEE JOINT: ICD-10-CM

## 2024-03-05 DIAGNOSIS — Z98.52 VASECTOMY STATUS: Chronic | ICD-10-CM

## 2024-03-05 DIAGNOSIS — Z95.5 PRESENCE OF CORONARY ANGIOPLASTY IMPLANT AND GRAFT: Chronic | ICD-10-CM

## 2024-03-05 DIAGNOSIS — N63.0 UNSPECIFIED LUMP IN UNSPECIFIED BREAST: Chronic | ICD-10-CM

## 2024-03-05 DIAGNOSIS — M17.11 UNILATERAL PRIMARY OSTEOARTHRITIS, RIGHT KNEE: ICD-10-CM

## 2024-03-05 DIAGNOSIS — Z96.652 PRESENCE OF LEFT ARTIFICIAL KNEE JOINT: Chronic | ICD-10-CM

## 2024-03-05 DIAGNOSIS — Z98.89 OTHER SPECIFIED POSTPROCEDURAL STATES: Chronic | ICD-10-CM

## 2024-03-05 DIAGNOSIS — M25.561 PAIN IN RIGHT KNEE: ICD-10-CM

## 2024-03-05 PROCEDURE — 72170 X-RAY EXAM OF PELVIS: CPT | Mod: 26

## 2024-03-05 PROCEDURE — 72170 X-RAY EXAM OF PELVIS: CPT

## 2024-03-05 PROCEDURE — 73564 X-RAY EXAM KNEE 4 OR MORE: CPT | Mod: 26,50

## 2024-03-05 PROCEDURE — 73564 X-RAY EXAM KNEE 4 OR MORE: CPT

## 2024-03-05 PROCEDURE — 99215 OFFICE O/P EST HI 40 MIN: CPT

## 2024-03-12 ENCOUNTER — NON-APPOINTMENT (OUTPATIENT)
Age: 79
End: 2024-03-12

## 2024-03-12 ENCOUNTER — APPOINTMENT (OUTPATIENT)
Dept: ORTHOPEDIC SURGERY | Facility: CLINIC | Age: 79
End: 2024-03-12
Payer: MEDICARE

## 2024-03-12 VITALS — SYSTOLIC BLOOD PRESSURE: 105 MMHG | DIASTOLIC BLOOD PRESSURE: 61 MMHG | HEART RATE: 61 BPM

## 2024-03-12 VITALS — WEIGHT: 195 LBS | HEIGHT: 70.5 IN | BODY MASS INDEX: 27.61 KG/M2

## 2024-03-12 PROCEDURE — 99214 OFFICE O/P EST MOD 30 MIN: CPT

## 2024-03-12 RX ORDER — GABAPENTIN 300 MG/1
300 CAPSULE ORAL
Refills: 0 | Status: ACTIVE | COMMUNITY

## 2024-03-12 RX ORDER — METHYLCELLULOSE 500 MG/1
TABLET ORAL
Refills: 0 | Status: ACTIVE | COMMUNITY

## 2024-03-12 RX ORDER — EZETIMIBE 10 MG/1
10 TABLET ORAL
Refills: 0 | Status: ACTIVE | COMMUNITY

## 2024-03-12 RX ORDER — LORATADINE 5 MG
TABLET,CHEWABLE ORAL
Refills: 0 | Status: ACTIVE | COMMUNITY

## 2024-03-12 RX ORDER — LATANOPROST/PF 0.005 %
0.01 DROPS OPHTHALMIC (EYE)
Refills: 0 | Status: ACTIVE | COMMUNITY

## 2024-03-12 NOTE — PHYSICAL EXAM
[Antalgic] : antalgic [Walker] : ambulates with walker [LE] : Sensory: Intact in bilateral lower extremities [DP] : dorsalis pedis 2+ and symmetric bilaterally [Normal RLE] : Right Lower Extremity: No scars, rashes, lesions, ulcers, skin intact [Normal Touch] : sensation intact for touch [Normal] : no peripheral adenopathy appreciated [de-identified] : On physical exam of the right knee skin is warm and dry without erythema ecchymosis signs or symptoms of infection superficial or deep.  There is no tenderness to palpation throughout the knee joint.  There is no palpable effusion.  Range of motion is 0-130 without pain or stiffness.  The knee is stable with varus and valgus stress.  There is a negative anterior posterior drawer.  Sensation is intact throughout the distal lower extremity.  Strength is well-maintained in all planes.  There is negative Homans exam.  2+ dorsalis pedis pulse. [de-identified] : No xrays at todays visit

## 2024-03-12 NOTE — HISTORY OF PRESENT ILLNESS
[de-identified] : 79-year-old male presents with right knee pain.  He has known osteoarthritis of the right knee.  He states now the pain is rated as a 5-6 out of 10.  He has not take any anti-inflammatory pain medications.  The pain is worsened and he is using a rolling walker to aid in his ambulation due to instability.  He is here and ready to discuss surgery today.  He denies any changes since his last office visit.  Denies any injuries. Denies fevers, chills, chest pain, calf pain, shortness of breath. [Worsening] : worsening [6] : a current pain level of 6/10 [Sitting] : sitting [Standing] : standing [Daily] : ~He/She~ states the symptoms seem to be occuring daily [Walking] : worsened by walking [Knee Extension] : worsened with knee extension [Knee Flexion] : worsened with knee flexion

## 2024-03-12 NOTE — DISCUSSION/SUMMARY
[Medication Risks Reviewed] : Medication risks reviewed [Surgical risks reviewed] : Surgical risks reviewed [de-identified] : The patient is an 79 year old man with bone on bone arthritis of their Right Knee. Based upon the patients continued symptoms and failure to respond to 3 months of conservative treatment I have recommended a Right Total Knee Replacement for this patient. A long discussion took place with the patient describing what a total joint replacement is and what the procedure would entail. A Total Knee model, similar to the implant that will be used during the operation was utilized to demonstrate and to discuss the various bearing surfaces of the implants.   The benefits of surgery were discussed with the patient including the potential for improving his/her current clinical condition through operative intervention. Alternatives to surgical intervention including continued conservative management were also discussed in detail.   The hospitalization and post-operative care and rehabilitation were also discussed. The use of perioperative antibiotics and DVT prophylaxis were discussed. The risk, benefits and alternatives to a surgical intervention were discussed at length with the patient. The patient was also advised of risks related to the medical comorbidities and elevated body mass index (BMI). A lengthy discussion took place to review the most common complications including but not limited to: deep vein thrombosis, pulmonary embolus, heart attack, stroke, infection, wound breakdown, numbness, damage to nerves, tendon, muscles, arteries or other blood vessels, death and other possible complications from anesthesia. The patient was told that we will take steps to minimize these risks by using sterile technique, antibiotics and DVT prophylaxis when appropriate and follow the patient postoperatively in the office setting to monitor progress. The possibility of recurrent pain, no improvement in pain and actual worsening of pain were also discussed with the patient.   The discharge plan of care focused on the patient going home following surgery. I encouraged the patient to make the necessary arrangements to have someone stay with them when they are discharged home. Following discharge, a home care nurse will visit the patient. He/she will open your home care case and request home physical therapy services. Home physical therapy will commence following discharge provided it is appropriate and covered by [his /her] health insurance benefit plan.   All questions were answered to the satisfaction of the patient. The treatment plan of care, as well as a model of a Total Knee equivalent to the one that will be used for their total joint replacement, was shared with the patient. The patient agreed to the plan of care as well as the use of implants in their total joint replacement.   The patient was advised that they will require a medical preoperative risk evaluation by their PCP. Further medical subspecialty clearances such as cardiac may be indicated if felt needed by their PCP.   The patient participated in an interactive discussion of the TKR implant planned for their surgery with questions answered, agreed with the treatment plan, and has decided to move forward with elective TKR as planned.   A DJO Knee implant is the implant I feel comfortable utilizing in my Primary TKRs and the implant I am planning for their TKR. If the patient wishes to utilize a different implant brand/type for their TKR, they may obtain an additional surgical opinion from a Surgeon utilizing that brand implant

## 2024-03-12 NOTE — END OF VISIT
[FreeTextEntry3] : I Dr. Huber, personally performed the evaluation and management services for this established patient who present today with a new problem/exacerbation of an existing condition. The E/M includes conducting the examination, assessing all new/exacerbated conditions, and establishing a new plan of care. Today, My ACP was here to assist in my evaluation and management services of this new problem/exacerbated condition to be followed going forward.

## 2024-03-20 ENCOUNTER — NON-APPOINTMENT (OUTPATIENT)
Age: 79
End: 2024-03-20

## 2024-03-28 NOTE — PROGRESS NOTE ADULT - PROBLEM SELECTOR PROBLEM 5
BPH (benign prostatic hyperplasia)
Reyes Ojeda(Attending)

## 2024-03-29 ENCOUNTER — APPOINTMENT (OUTPATIENT)
Dept: ORTHOPEDIC SURGERY | Facility: CLINIC | Age: 79
End: 2024-03-29

## 2024-04-11 ENCOUNTER — APPOINTMENT (OUTPATIENT)
Dept: INTERNAL MEDICINE | Facility: CLINIC | Age: 79
End: 2024-04-11
Payer: MEDICARE

## 2024-04-11 VITALS
RESPIRATION RATE: 16 BRPM | SYSTOLIC BLOOD PRESSURE: 138 MMHG | BODY MASS INDEX: 27.61 KG/M2 | WEIGHT: 195 LBS | TEMPERATURE: 97.5 F | DIASTOLIC BLOOD PRESSURE: 70 MMHG | OXYGEN SATURATION: 98 % | HEART RATE: 54 BPM | HEIGHT: 70.5 IN

## 2024-04-11 DIAGNOSIS — Z01.818 ENCOUNTER FOR OTHER PREPROCEDURAL EXAMINATION: ICD-10-CM

## 2024-04-11 DIAGNOSIS — L30.9 DERMATITIS, UNSPECIFIED: ICD-10-CM

## 2024-04-11 PROCEDURE — 36415 COLL VENOUS BLD VENIPUNCTURE: CPT

## 2024-04-11 PROCEDURE — 99214 OFFICE O/P EST MOD 30 MIN: CPT | Mod: 25

## 2024-04-11 RX ORDER — CYCLOSPORINE 25 MG/1
25 CAPSULE, GELATIN COATED ORAL
Qty: 10 | Refills: 0 | Status: ACTIVE | COMMUNITY
Start: 2024-04-11

## 2024-04-11 RX ORDER — AZATHIOPRINE 50 1/1
50 TABLET ORAL DAILY
Qty: 30 | Refills: 0 | Status: ACTIVE | COMMUNITY
Start: 2024-04-11

## 2024-04-11 RX ORDER — SULFAMETHOXAZOLE AND TRIMETHOPRIM 200; 40 MG/5ML; MG/5ML
200-40 SUSPENSION ORAL DAILY
Qty: 10 | Refills: 0 | Status: ACTIVE | COMMUNITY
Start: 2024-04-11

## 2024-04-11 NOTE — PHYSICAL EXAM
[No Acute Distress] : no acute distress [Normal Sclera/Conjunctiva] : normal sclera/conjunctiva [PERRL] : pupils equal round and reactive to light [EOMI] : extraocular movements intact [Normal Outer Ear/Nose] : the outer ears and nose were normal in appearance [Normal Oropharynx] : the oropharynx was normal [No JVD] : no jugular venous distention [No Lymphadenopathy] : no lymphadenopathy [Supple] : supple [Thyroid Normal, No Nodules] : the thyroid was normal and there were no nodules present [No Respiratory Distress] : no respiratory distress  [No Accessory Muscle Use] : no accessory muscle use [Clear to Auscultation] : lungs were clear to auscultation bilaterally [Normal Rate] : normal rate  [Regular Rhythm] : with a regular rhythm [Normal S1, S2] : normal S1 and S2 [No Murmur] : no murmur heard [No Carotid Bruits] : no carotid bruits [No Abdominal Bruit] : a ~M bruit was not heard ~T in the abdomen [No Varicosities] : no varicosities [Pedal Pulses Present] : the pedal pulses are present [No Edema] : there was no peripheral edema [No Palpable Aorta] : no palpable aorta [No Extremity Clubbing/Cyanosis] : no extremity clubbing/cyanosis [Soft] : abdomen soft [Non Tender] : non-tender [Non-distended] : non-distended [No Masses] : no abdominal mass palpated [No HSM] : no HSM [Normal Bowel Sounds] : normal bowel sounds [Normal Posterior Cervical Nodes] : no posterior cervical lymphadenopathy [Normal Anterior Cervical Nodes] : no anterior cervical lymphadenopathy [No CVA Tenderness] : no CVA  tenderness [No Spinal Tenderness] : no spinal tenderness [No Joint Swelling] : no joint swelling [Grossly Normal Strength/Tone] : grossly normal strength/tone [No Rash] : no rash [Coordination Grossly Intact] : coordination grossly intact [No Focal Deficits] : no focal deficits [Normal Gait] : normal gait [Deep Tendon Reflexes (DTR)] : deep tendon reflexes were 2+ and symmetric [Normal Affect] : the affect was normal [Normal Insight/Judgement] : insight and judgment were intact

## 2024-04-12 NOTE — DISCUSSION/SUMMARY
[de-identified] : 80 y/o M with severe right knee osteoarthritis and well-functioning left total knee arthroplasty in the setting of multiple medical comorbidities and recent right shoulder rotator cuff injury.   - He is a candidate at this time for right total knee replacement with MICHELLE robotic assistance.  -  We discussed the details of the procedure, the expected recovery period, and the expected outcome. We discussed the likelihood of satisfaction after complete recovery, and the potential causes of dissatisfaction. The importance of active patient participation in the rehabilitation protocol was emphasized, along with its influence on short and long-term outcomes. Specific risks of total knee replacement were discussed in detail. We discussed the risk of surgical site complications including but not limited to: surgical site infection, wound healing complications, bone fracture, tendon or ligament injury, neurovascular injury, hemorrhage, postoperative stiffness or instability, persistent pain and need for reoperation or manipulation under anesthesia. We discussed surgical blood loss and the possible need for blood transfusion. We discussed the risk of perioperative medical complications, including but not limited to catheter-associated urinary tract infection, venous thromboembolism and other cardiopulmonary complications. We discussed anesthetic options and the risk of anesthesia-related complications. We discussed implant fixation methods; my plan would be to use fully cemented fixation in this case. We discussed the variable need to resurface the patella; my plan would be to resurface in this case. We discussed the durability of prosthetic knees and limitations related to wear, osteolysis and loosening.  We discussed the role of the robot in helping to guide bone resections and measure alignment and soft tissue balance. All questions were answered the patient's satisfaction. The patient was given a copy of my preoperative packet with additional information about the procedure. I asked the patient to either call back or schedule a followup appointment for any additional questions or concerns regarding the procedure. - We discussed that he has significant medical comorbidities that will need to be appropriately managed in the perioperative episode. We would need to enlist medical clearance as well as cardiology clearance through his cardiologist, Dr. Amado Lewis. We typically do not need to hold Imuran and Gengraf for arthroplasty, but per the patient's request we can discuss this further with his dermatologist, Dr. Chand at [facility]. Furthermore, his Xarelto would need to be stopped for an appropriate period of time, and this would be with input from Dr. Lewis.  - The pt indicated that he would like to target a surgical date sometime in the midportion of April, which I think is a realistic and reasonable timeframe.  - He is also seeking further f/u with Dr. Huber and may also seek other orthopedic opinions as well, which I encouraged him to do.  - We reviewed that he should not have any further intraarticular injections to the right knee if he is planning a surgical date in April, to which he verbalized good understanding.  - We reviewed that based on the appearance of his gait pattern today, I do consider him to be a fall risk. Given the compromise of his right upper extremity, I recommended that he use a rollator until the time of his operation to prevent any falls or any further injury to the right knee or the right shoulder. I provided him with a script for a rollator today.  - I encouraged him to f/u with my surgical coordinator if he does decide to pursue surgery with us, which would be with medical and cardiac clearances.

## 2024-04-12 NOTE — PHYSICAL EXAM
[de-identified] :  General appearance: well nourished and hydrated, pleasant, alert and oriented x 3, cooperative. HEENT: normocephalic, EOM intact, wearing mask, external auditory canal clear. Cardiovascular: no lower leg edema, no varicosities, dorsalis pedis pulses palpable and symmetric. Lymphatics: no palpable lymphadenopathy, no lymphedema. Neurologic: no muscle weakness in upper or lower extremities, patella tendon reflexes present and symmetric. Left ankle dorsiflexion strength 5/5. Right ankle 4+/5. Diminished sensibility to light touch throughout a stocking distribution but he is able to identify light touch through the bilateral lower extremities.  Dermatologic: skin moist, warm, no rash. Spine: cervical spine with normal lordosis and painless range of motion, thoracic spine with normal kyphosis and painless range of motion, lumbosacral spine with normal lordosis and painless range of motion.  No tenderness to palpation along midline spine and paraspinal musculature.  Sacroiliac joints nontender bilaterally. Negative SLR and crossed SLR tests bilaterally. Gait: Pt presents today with a cane. He is cautious to stand and get started and demonstrates right-sided antalgia.   Left knee: - Focal soft tissue swelling: no palpable Baker's cyst - Ecchymosis: none - Erythema: none - Effusion: none - Wounds: Well-healed midline incision, benign appearing - Alignment: normal - Tenderness: none - ROM: 0-95 - Collateral laxity: none - Cruciate laxity: none - Popliteal angle (degrees): 20 - Quad strength: 5/5    Right knee: - Focal soft tissue swelling: no palpable Baker's cyst - Ecchymosis: none - Erythema: none - Effusion: small - Wounds: none - Alignment: varus - Tenderness: No TTP of the medial and lateral joint lines. Pain and crepitus with patellar compression test - ROM: 0-130, limited by pain and apprehension - Collateral laxity: increased pseudolaxity to varus - Cruciate laxity: none - Popliteal angle (degrees): 25 - Quad strength: 4+/5  Right shoulder: - He is able to slowly and cautiously achieve active elevation of the right shoulder to approximately 100 degrees.  [de-identified] :  AP pelvis and 4 views of the bilateral knees (weightbearing AP, weightbearing Tao, weightbearing lateral, and Sunrise) were interpreted by me and reviewed with the patient.  Location of imaging: [facility] Date of exam: 03/05/2024  Pelvic alignment: Relatively outlet posture  Right hip --  Arthritis: Mild age-appropriate osteoarthritis with diffuse joint space narrowing, Tonnis 0-1 Deformity: none Osteonecrosis: no radiographic evidence of primary osteonecrosis  Left hip --  Arthritis: Mild age-appropriate osteoarthritis with diffuse joint space narrowing, Tonnis 0-1 Deformity: none Osteonecrosis: no radiographic evidence of primary osteonecrosis  Right knee --  Alignment: varus malalignment with lateral tibial subluxation Arthritis: tricompartmental osteoarthritis with bone-on-bone articulation of both the medial and patellofemoral compartments, Kellgren & Mickey 4. Chondrocalcinosis is present throughout the tibiofemoral compartments.  Patellar height: Normal  Patellar tracking: Centrally with preferential lateral facet effacement.  Left knee --  Demonstrates a total knee arthroplasty in position. All components appear to be well fixed at reasonable alignment with no evidence of mechanical complication.  Patellar height: Normal Patellar tracking: Centrally

## 2024-04-12 NOTE — HISTORY OF PRESENT ILLNESS
[9] : a current pain level of 9/10 [Constant] : ~He/She~ states the symptoms seem to be constant [Direct Pressure] : worsened by direct pressure [NSAIDs] : relieved by nonsteroidal anti-inflammatory drugs [de-identified] : 03/05/2024: 78 y/o M presenting for evaluation of right knee pain. Pt has been having knee pain for several years now, progressively getting worse over the past 2 years. He reports that the right knee pain has predominantly been medial sided. However, over the past few months, he has noted that the lateral and anterolateral components of pain have now overtaken the medial component of the pain. Pain is worse with standing and walking. He notes a walking distance limitation of about 1 block with the use of a cane. Rates pain at 9/10 today. He's been taking Celebrex 200 mg daily as well as Tylenol. He hasn't participated in PT in over 20 years when he had his left knee replaced. In terms of treatment, he's been undergoing several courses of gel-one injections, most recently in December 2023. He reported the last few courses of injections have been providing diminishing relief. He has not had any cortisone in the past.   He notes PMHx of left lower extremity DVT 4 years ago, for which he was placed on Xarelto for life. Pt reports that he's taking various immune modulators including Imuran and Gengraf. He has 5 coronary stents, most recently in 2012. He is taking any aspirin for that currently. He has severe eczema, which he's taking Gabapentin for. He had a left knee replacement at [facility] in 2001, cervical corpectomy at [facility] by Dr. Wheeler in 2020, 5 coronary stents placed between 2003 and 2012, and a left rotator cuff procedure in 1991. Pt reports that he experienced a left knee periprosthetic infection that was treated with an irrigation debridement and apparent polyethylene exchange by his original surgeon, Dr. Niranjan Wesley, with apparent eradication of the infection and has had good function of the left knee ever since that time. Of note, he also suffered a severe rotator cuff injury to the right shoulder at the beginning of February 2024. This has been under observation by Dr. Kam, but he reports that he still has ongoing severe pain and loss of function in the right upper extremity, which is his dominant arm. Furthermore, he suffers from severe bilateral lower extremity neuropathy, affecting both feet and reports that his balance has chronically suffered as a consequence of this.  Denies any allergies. Pt reports that he lives with his wife in a handicap accessible and elevator accessible apartment building in which he does not have to manage any stairs. Furthermore, he has had the discussion regarding whether he can recover from his knee replacement with Dr. Kam, who is managing his right shoulder issues. Dr. Kam has recommended proceeding forward and does believe that he would be able to continue to use a cane or a walker in the course of his post-operative recovery. [de-identified] : sharp

## 2024-04-12 NOTE — END OF VISIT
[FreeTextEntry3] : Documented by Maye Kidd acting as a scribe for Dr. Gianni Hodges.03/05/2024   All medical record entries made by the Scribe were at my, Dr. Gianni Hodges, direction and personally dictated by me on 03/05/2024 . I have reviewed the chart and agree that the record accurately reflects my personal performance of the history, physical exam, assessment andplan. I have also personally directed, reiewed, and agreed with the chart. No

## 2024-04-15 LAB
ALBUMIN SERPL ELPH-MCNC: 4.1 G/DL
ALP BLD-CCNC: 62 U/L
ALT SERPL-CCNC: 19 U/L
ANION GAP SERPL CALC-SCNC: 10 MMOL/L
APPEARANCE: CLEAR
APTT BLD: 27.6 SEC
AST SERPL-CCNC: 26 U/L
BILIRUB SERPL-MCNC: 0.6 MG/DL
BILIRUBIN URINE: NEGATIVE
BLOOD URINE: NEGATIVE
BUN SERPL-MCNC: 21 MG/DL
CALCIUM SERPL-MCNC: 9.4 MG/DL
CHLORIDE SERPL-SCNC: 105 MMOL/L
CO2 SERPL-SCNC: 25 MMOL/L
COLOR: YELLOW
CREAT SERPL-MCNC: 0.95 MG/DL
EGFR: 81 ML/MIN/1.73M2
ESTIMATED AVERAGE GLUCOSE: 103 MG/DL
GLUCOSE QUALITATIVE U: NEGATIVE MG/DL
GLUCOSE SERPL-MCNC: 85 MG/DL
HBA1C MFR BLD HPLC: 5.2 %
HCT VFR BLD CALC: 39.6 %
HGB BLD-MCNC: 12.5 G/DL
INR PPP: 1.46 RATIO
KETONES URINE: NEGATIVE MG/DL
LEUKOCYTE ESTERASE URINE: NEGATIVE
MCHC RBC-ENTMCNC: 31.6 GM/DL
MCHC RBC-ENTMCNC: 32.8 PG
MCV RBC AUTO: 103.9 FL
MRSA SPEC QL CULT: NOT DETECTED
NITRITE URINE: NEGATIVE
PH URINE: 7
PLATELET # BLD AUTO: 176 K/UL
POTASSIUM SERPL-SCNC: 4.8 MMOL/L
PROT SERPL-MCNC: 6.3 G/DL
PROTEIN URINE: NEGATIVE MG/DL
PT BLD: 16.3 SEC
RBC # BLD: 3.81 M/UL
RBC # FLD: 13.2 %
SODIUM SERPL-SCNC: 140 MMOL/L
SPECIFIC GRAVITY URINE: 1.02
STAPH AUREUS (SA): NOT DETECTED
UROBILINOGEN URINE: 0.2 MG/DL
WBC # FLD AUTO: 4.49 K/UL

## 2024-04-15 NOTE — ASSESSMENT
[Patient Optimized for Surgery] : Patient optimized for surgery [No Further Testing Recommended] : no further testing recommended [FreeTextEntry4] : 79 yrs old M with pmx of chronic peripheral neuropathy, lacunar stroke, CAD s/p 5 stents in 2012, DVT one episode on Xarelto for ? abnormal coagulation profile, HTN, carotid stenosis, BPH comes in for pre-op eval for RT TKR - MICHELLE.   RCRI 2 points class III risk. Poor functional status Pt is moderate risk for low/intermediate risk procedure. cleared by cardiology as per following plan: Recommend post- op EKG. Minimize epinephrine if possible Hold Xarelto 5 days before procedure continue aspirin 81 mg daily through the procedure if acceptable to surgeon.  No other interventions needed from medicine at this point of time.

## 2024-04-15 NOTE — HISTORY OF PRESENT ILLNESS
[Coronary Artery Disease] : coronary artery disease [No Pertinent Pulmonary History] : no history of asthma, COPD, sleep apnea, or smoking [No Adverse Anesthesia Reaction] : no adverse anesthesia reaction in self or family member [(Patient denies any chest pain, claudication, dyspnea on exertion, orthopnea, palpitations or syncope)] : Patient denies any chest pain, claudication, dyspnea on exertion, orthopnea, palpitations or syncope [Poor (<4 METs)] : Poor (<4 METs) [FreeTextEntry1] : RT CESAR MARTINEZ [FreeTextEntry2] : 04/29/2024 [FreeTextEntry4] : 79 yrs old M with pmx of chronic peripheral neuropathy, lacunar stroke, CAD s/p 5 stents in 2012, DVT one episode on Xarelto for ? abnormal coagulation profile, HTN, carotid stenosis, BPH comes in for pre-op eval for RT TKR - MICHELLE.   No new complains.  Denies any chest pain, cough, fevers, abd pain, vomiting, diarrhea, rash, sob, palpitations. poor functional capacity: can walk 1 block without any problems, limited due to knee and back pain.  No problems with anesthesia in the past. Uses aspirin and Xarelto daily, use herbal meds.

## 2024-04-19 ENCOUNTER — NON-APPOINTMENT (OUTPATIENT)
Age: 79
End: 2024-04-19

## 2024-04-22 ENCOUNTER — RESULT REVIEW (OUTPATIENT)
Age: 79
End: 2024-04-22

## 2024-04-22 ENCOUNTER — OUTPATIENT (OUTPATIENT)
Dept: OUTPATIENT SERVICES | Facility: HOSPITAL | Age: 79
LOS: 1 days | End: 2024-04-22
Payer: MEDICARE

## 2024-04-22 DIAGNOSIS — Z98.89 OTHER SPECIFIED POSTPROCEDURAL STATES: Chronic | ICD-10-CM

## 2024-04-22 DIAGNOSIS — Z98.890 OTHER SPECIFIED POSTPROCEDURAL STATES: Chronic | ICD-10-CM

## 2024-04-22 DIAGNOSIS — Z95.5 PRESENCE OF CORONARY ANGIOPLASTY IMPLANT AND GRAFT: Chronic | ICD-10-CM

## 2024-04-22 DIAGNOSIS — N63.0 UNSPECIFIED LUMP IN UNSPECIFIED BREAST: Chronic | ICD-10-CM

## 2024-04-22 DIAGNOSIS — Z96.652 PRESENCE OF LEFT ARTIFICIAL KNEE JOINT: Chronic | ICD-10-CM

## 2024-04-22 DIAGNOSIS — Z41.9 ENCOUNTER FOR PROCEDURE FOR PURPOSES OTHER THAN REMEDYING HEALTH STATE, UNSPECIFIED: Chronic | ICD-10-CM

## 2024-04-22 DIAGNOSIS — Z98.52 VASECTOMY STATUS: Chronic | ICD-10-CM

## 2024-04-22 PROCEDURE — 77073 BONE LENGTH STUDIES: CPT | Mod: 26

## 2024-04-22 PROCEDURE — 77073 BONE LENGTH STUDIES: CPT

## 2024-04-26 VITALS
HEIGHT: 71 IN | WEIGHT: 215.61 LBS | DIASTOLIC BLOOD PRESSURE: 58 MMHG | RESPIRATION RATE: 16 BRPM | HEART RATE: 63 BPM | SYSTOLIC BLOOD PRESSURE: 139 MMHG | TEMPERATURE: 98 F | OXYGEN SATURATION: 98 %

## 2024-04-26 RX ORDER — RAMIPRIL 5 MG
1 CAPSULE ORAL
Qty: 0 | Refills: 0 | DISCHARGE

## 2024-04-26 RX ORDER — ACETAMINOPHEN 500 MG/1
500 TABLET ORAL
Qty: 180 | Refills: 1 | Status: ACTIVE | COMMUNITY
Start: 2024-04-26 | End: 1900-01-01

## 2024-04-26 RX ORDER — PANTOPRAZOLE 40 MG/1
40 TABLET, DELAYED RELEASE ORAL DAILY
Qty: 30 | Refills: 2 | Status: ACTIVE | COMMUNITY
Start: 2024-04-26 | End: 1900-01-01

## 2024-04-26 RX ORDER — CELECOXIB 200 MG/1
200 CAPSULE ORAL TWICE DAILY
Qty: 60 | Refills: 2 | Status: ACTIVE | COMMUNITY
Start: 2024-04-26 | End: 1900-01-01

## 2024-04-26 RX ORDER — CALCIUM CARBONATE 500(1250)
2 TABLET ORAL
Qty: 0 | Refills: 0 | DISCHARGE

## 2024-04-26 RX ORDER — METHENAMINE MANDELATE 1 G
2 TABLET ORAL
Qty: 0 | Refills: 0 | DISCHARGE

## 2024-04-26 RX ORDER — CYCLOSPORINE 100 MG/1
1 CAPSULE ORAL
Qty: 0 | Refills: 0 | DISCHARGE

## 2024-04-26 RX ORDER — POVIDONE-IODINE 5 %
1 AEROSOL (ML) TOPICAL ONCE
Refills: 0 | Status: DISCONTINUED | OUTPATIENT
Start: 2024-04-29 | End: 2024-05-02

## 2024-04-26 RX ORDER — TRAMADOL HYDROCHLORIDE 50 MG/1
50 TABLET, COATED ORAL
Qty: 50 | Refills: 0 | Status: ACTIVE | COMMUNITY
Start: 2024-04-26 | End: 1900-01-01

## 2024-04-26 NOTE — H&P ADULT - NSHPSOCIALHISTORY_GEN_ALL_CORE
Former smoker - quit 40 years ago, smoked 1-2 cigarettes per day x 6 years. Denies recreational drug use or etoh use.

## 2024-04-26 NOTE — ASU PREOP CHECKLIST - ISOLATION PRECAUTIONS
none
Render In Strict Bullet Format?: No
Detail Level: Zone
Initiate Treatment: Ketoconazole 2% shampoo three times a week
soft

## 2024-04-26 NOTE — H&P ADULT - NSHPPHYSICALEXAM_GEN_ALL_CORE
MSK: Decreased right knee ROM secondary to pain    Remainder of physical exam per medical clearance note Gen: Alert and oriented, NAD  MSK: Decreased right knee ROM secondary to pain  No evidence of deformity or open wound   2+ DP pulses palpable bilaterally, skin wwp, cap refill brisk, no calf tenderness bilaterally  SILT to bilateral lower extremities  EHL/FHL/TA/GS 5/5 bilaterally     Remainder of physical exam per medical clearance note

## 2024-04-26 NOTE — ED ADULT NURSE NOTE - SUICIDE SCREENING QUESTION 2
38 year old male patient with PMHx HTN, anemia of chronic disease, ESRD on HD (M/W/F via right AV fistula), left hip arthroplasty in 8/23, history of right leg fracture presented to [facility] from [facility] on 3/6 after falling during a wheelchair transfer landing on his right knee. He is wheelchair bound since his left hip surgery. Found to have acute worsening of chronic anemia on AM labs.  S/P EGD 4/19 found to have duodenal ulcer, clean based. Pt also had colonoscopy done on 4/19/24: suboptimal prep but no gross lesions or bleeding.            No

## 2024-04-26 NOTE — H&P ADULT - HISTORY OF PRESENT ILLNESS
79yoM with right knee pain x     Presents today for elective right total knee replacement with Dr. Hodges 79yoM with right knee pain x     Last dose xarelto ___    Presents today for elective right total knee replacement with Dr. Hodges 79yoM with right knee pain x 4 years without inciting accident or injury. He takes Celebrex and Cosamine at home for pain with some relief. Pain persists despite conservative measures. He endorses bilateral neuropathy in both feet for which he originally needed to use a cane for but has now progressed to a walker.     Last dose xarelto 4/24/24. Hx of left leg DVT   s/p Left TKA about 20 years ago, course c/b PJI    Presents today for elective right total knee replacement with Dr. Hodges

## 2024-04-26 NOTE — H&P ADULT - NSHPLABSRESULTS_GEN_ALL_CORE
Preop CBC, BMP, PT/INR, PTT within normal range and reviewed per medical clearance  H/H: 12.5/39.6  Cr: 0.95  UA: within normal limits and reviewed per medical clearance  Preop CXR within normal limits and reviewed per medical clearance   Preop EKG sinus bradycardia HR 55bpm LAD, RBBB, and reviewed per medical clearance  TTE: LVE 50-55% 2/6/24  Stress test 1/17/24 reviewed per   3M: SHELLEY

## 2024-04-26 NOTE — H&P ADULT - PROBLEM SELECTOR PLAN 1
Admit to Orthopaedic Service.  Presents today for elective right total knee replacement  Pt medically stable and cleared for procedure today by Dr. Fonseca and Dr. Lewis

## 2024-04-28 ENCOUNTER — TRANSCRIPTION ENCOUNTER (OUTPATIENT)
Age: 79
End: 2024-04-28

## 2024-04-29 ENCOUNTER — INPATIENT (INPATIENT)
Facility: HOSPITAL | Age: 79
LOS: 2 days | Discharge: HOME HEALTH | End: 2024-05-02
Attending: ORTHOPAEDIC SURGERY | Admitting: ORTHOPAEDIC SURGERY
Payer: MEDICARE

## 2024-04-29 ENCOUNTER — APPOINTMENT (OUTPATIENT)
Dept: ORTHOPEDIC SURGERY | Facility: HOSPITAL | Age: 79
End: 2024-04-29

## 2024-04-29 ENCOUNTER — RESULT REVIEW (OUTPATIENT)
Age: 79
End: 2024-04-29

## 2024-04-29 DIAGNOSIS — M17.11 UNILATERAL PRIMARY OSTEOARTHRITIS, RIGHT KNEE: ICD-10-CM

## 2024-04-29 DIAGNOSIS — N40.0 BENIGN PROSTATIC HYPERPLASIA WITHOUT LOWER URINARY TRACT SYMPTOMS: ICD-10-CM

## 2024-04-29 DIAGNOSIS — Z98.89 OTHER SPECIFIED POSTPROCEDURAL STATES: Chronic | ICD-10-CM

## 2024-04-29 DIAGNOSIS — Z98.890 OTHER SPECIFIED POSTPROCEDURAL STATES: Chronic | ICD-10-CM

## 2024-04-29 DIAGNOSIS — L30.9 DERMATITIS, UNSPECIFIED: ICD-10-CM

## 2024-04-29 DIAGNOSIS — I25.10 ATHEROSCLEROTIC HEART DISEASE OF NATIVE CORONARY ARTERY WITHOUT ANGINA PECTORIS: ICD-10-CM

## 2024-04-29 DIAGNOSIS — Z96.652 PRESENCE OF LEFT ARTIFICIAL KNEE JOINT: Chronic | ICD-10-CM

## 2024-04-29 DIAGNOSIS — I10 ESSENTIAL (PRIMARY) HYPERTENSION: ICD-10-CM

## 2024-04-29 DIAGNOSIS — Z41.9 ENCOUNTER FOR PROCEDURE FOR PURPOSES OTHER THAN REMEDYING HEALTH STATE, UNSPECIFIED: Chronic | ICD-10-CM

## 2024-04-29 DIAGNOSIS — Z95.5 PRESENCE OF CORONARY ANGIOPLASTY IMPLANT AND GRAFT: Chronic | ICD-10-CM

## 2024-04-29 DIAGNOSIS — N63.0 UNSPECIFIED LUMP IN UNSPECIFIED BREAST: Chronic | ICD-10-CM

## 2024-04-29 DIAGNOSIS — I82.409 ACUTE EMBOLISM AND THROMBOSIS OF UNSPECIFIED DEEP VEINS OF UNSPECIFIED LOWER EXTREMITY: ICD-10-CM

## 2024-04-29 DIAGNOSIS — G47.33 OBSTRUCTIVE SLEEP APNEA (ADULT) (PEDIATRIC): ICD-10-CM

## 2024-04-29 DIAGNOSIS — Z98.52 VASECTOMY STATUS: Chronic | ICD-10-CM

## 2024-04-29 LAB
APTT BLD: 39.9 SEC — HIGH (ref 24.5–35.6)
INR BLD: 1.07 — SIGNIFICANT CHANGE UP (ref 0.85–1.18)
PROTHROM AB SERPL-ACNC: 12.2 SEC — SIGNIFICANT CHANGE UP (ref 9.5–13)

## 2024-04-29 PROCEDURE — 27447 TOTAL KNEE ARTHROPLASTY: CPT | Mod: RT

## 2024-04-29 PROCEDURE — S2900 ROBOTIC SURGICAL SYSTEM: CPT | Mod: NC

## 2024-04-29 PROCEDURE — 73560 X-RAY EXAM OF KNEE 1 OR 2: CPT | Mod: 26,RT

## 2024-04-29 DEVICE — IMPLANTABLE DEVICE: Type: IMPLANTABLE DEVICE | Status: FUNCTIONAL

## 2024-04-29 DEVICE — STEM PERSONA TIB CMNT SZ H R 5 DEG: Type: IMPLANTABLE DEVICE | Status: FUNCTIONAL

## 2024-04-29 DEVICE — PIN CAS FIX 3.2X150MM: Type: IMPLANTABLE DEVICE | Status: FUNCTIONAL

## 2024-04-29 DEVICE — STEM EXT PERSONA 14MM PLUS 30M: Type: IMPLANTABLE DEVICE | Status: FUNCTIONAL

## 2024-04-29 DEVICE — CELLERATE SURGICAL POWDER RX 5GM: Type: IMPLANTABLE DEVICE | Status: FUNCTIONAL

## 2024-04-29 DEVICE — ZIMMER/NEXGEN HEX HEAD SCREW 3.5MM: Type: IMPLANTABLE DEVICE | Status: FUNCTIONAL

## 2024-04-29 DEVICE — SURF ART PSN REV TAPERED SMOOTH STEM EXT 14X75MM: Type: IMPLANTABLE DEVICE | Status: FUNCTIONAL

## 2024-04-29 DEVICE — ZIMMER/NEXGEN SMOOTH PIN 3.2X75MM: Type: IMPLANTABLE DEVICE | Status: FUNCTIONAL

## 2024-04-29 DEVICE — ZIMMER FEMALE HEX SCREW MAGNETIC 2.5MM X 25MM: Type: IMPLANTABLE DEVICE | Status: FUNCTIONAL

## 2024-04-29 RX ORDER — CYCLOSPORINE 100 MG/1
25 CAPSULE ORAL DAILY
Refills: 0 | Status: DISCONTINUED | OUTPATIENT
Start: 2024-04-29 | End: 2024-05-01

## 2024-04-29 RX ORDER — FINASTERIDE 5 MG/1
5 TABLET, FILM COATED ORAL DAILY
Refills: 0 | Status: DISCONTINUED | OUTPATIENT
Start: 2024-04-29 | End: 2024-05-02

## 2024-04-29 RX ORDER — GLUCOSAMINE HCL/CHONDROITIN SU 500-400 MG
1 CAPSULE ORAL
Qty: 0 | Refills: 0 | DISCHARGE

## 2024-04-29 RX ORDER — ACETAMINOPHEN 500 MG
1000 TABLET ORAL ONCE
Refills: 0 | Status: COMPLETED | OUTPATIENT
Start: 2024-04-29 | End: 2024-04-29

## 2024-04-29 RX ORDER — ONDANSETRON 8 MG/1
4 TABLET, FILM COATED ORAL EVERY 6 HOURS
Refills: 0 | Status: DISCONTINUED | OUTPATIENT
Start: 2024-04-29 | End: 2024-05-02

## 2024-04-29 RX ORDER — APREPITANT 80 MG/1
40 CAPSULE ORAL ONCE
Refills: 0 | Status: COMPLETED | OUTPATIENT
Start: 2024-04-29 | End: 2024-04-29

## 2024-04-29 RX ORDER — EZETIMIBE 10 MG/1
1 TABLET ORAL
Refills: 0 | DISCHARGE

## 2024-04-29 RX ORDER — AZATHIOPRINE 100 MG/1
50 TABLET ORAL DAILY
Refills: 0 | Status: DISCONTINUED | OUTPATIENT
Start: 2024-04-29 | End: 2024-05-02

## 2024-04-29 RX ORDER — METHYLCELLULOSE 500 MG
4 TABLET ORAL
Qty: 0 | Refills: 0 | DISCHARGE

## 2024-04-29 RX ORDER — ASPIRIN/CALCIUM CARB/MAGNESIUM 324 MG
81 TABLET ORAL DAILY
Refills: 0 | Status: DISCONTINUED | OUTPATIENT
Start: 2024-04-30 | End: 2024-05-02

## 2024-04-29 RX ORDER — LATANOPROST 0.05 MG/ML
1 SOLUTION/ DROPS OPHTHALMIC; TOPICAL
Qty: 0 | Refills: 0 | DISCHARGE

## 2024-04-29 RX ORDER — KETOROLAC TROMETHAMINE 30 MG/ML
15 SYRINGE (ML) INJECTION EVERY 6 HOURS
Refills: 0 | Status: DISCONTINUED | OUTPATIENT
Start: 2024-04-29 | End: 2024-04-30

## 2024-04-29 RX ORDER — CEFAZOLIN SODIUM 1 G
2000 VIAL (EA) INJECTION EVERY 8 HOURS
Refills: 0 | Status: COMPLETED | OUTPATIENT
Start: 2024-04-29 | End: 2024-04-30

## 2024-04-29 RX ORDER — ACETAMINOPHEN 500 MG
650 TABLET ORAL EVERY 6 HOURS
Refills: 0 | Status: DISCONTINUED | OUTPATIENT
Start: 2024-04-29 | End: 2024-04-30

## 2024-04-29 RX ORDER — OXYCODONE HYDROCHLORIDE 5 MG/1
10 TABLET ORAL
Refills: 0 | Status: DISCONTINUED | OUTPATIENT
Start: 2024-04-29 | End: 2024-04-30

## 2024-04-29 RX ORDER — RIVAROXABAN 15 MG-20MG
10 KIT ORAL DAILY
Refills: 0 | Status: DISCONTINUED | OUTPATIENT
Start: 2024-04-30 | End: 2024-05-02

## 2024-04-29 RX ORDER — CHLORHEXIDINE GLUCONATE 213 G/1000ML
1 SOLUTION TOPICAL ONCE
Refills: 0 | Status: COMPLETED | OUTPATIENT
Start: 2024-04-29 | End: 2024-04-29

## 2024-04-29 RX ORDER — CYCLOSPORINE 100 MG/1
1 CAPSULE ORAL
Refills: 0 | DISCHARGE

## 2024-04-29 RX ORDER — POLYETHYLENE GLYCOL 3350 17 G/17G
17 POWDER, FOR SOLUTION ORAL AT BEDTIME
Refills: 0 | Status: DISCONTINUED | OUTPATIENT
Start: 2024-04-29 | End: 2024-05-02

## 2024-04-29 RX ORDER — GABAPENTIN 400 MG/1
900 CAPSULE ORAL AT BEDTIME
Refills: 0 | Status: DISCONTINUED | OUTPATIENT
Start: 2024-04-29 | End: 2024-05-02

## 2024-04-29 RX ORDER — ASCORBIC ACID 60 MG
1 TABLET,CHEWABLE ORAL
Qty: 0 | Refills: 0 | DISCHARGE

## 2024-04-29 RX ORDER — OXYCODONE HYDROCHLORIDE 5 MG/1
5 TABLET ORAL
Refills: 0 | Status: DISCONTINUED | OUTPATIENT
Start: 2024-04-29 | End: 2024-04-30

## 2024-04-29 RX ORDER — GABAPENTIN 400 MG/1
3 CAPSULE ORAL
Refills: 0 | DISCHARGE

## 2024-04-29 RX ORDER — ATORVASTATIN CALCIUM 80 MG/1
20 TABLET, FILM COATED ORAL AT BEDTIME
Refills: 0 | Status: DISCONTINUED | OUTPATIENT
Start: 2024-04-29 | End: 2024-05-01

## 2024-04-29 RX ORDER — SODIUM CHLORIDE 9 MG/ML
1000 INJECTION, SOLUTION INTRAVENOUS
Refills: 0 | Status: DISCONTINUED | OUTPATIENT
Start: 2024-04-29 | End: 2024-05-02

## 2024-04-29 RX ORDER — LISINOPRIL 2.5 MG/1
10 TABLET ORAL DAILY
Refills: 0 | Status: DISCONTINUED | OUTPATIENT
Start: 2024-04-29 | End: 2024-04-29

## 2024-04-29 RX ORDER — LATANOPROST 0.05 MG/ML
1 SOLUTION/ DROPS OPHTHALMIC; TOPICAL AT BEDTIME
Refills: 0 | Status: DISCONTINUED | OUTPATIENT
Start: 2024-04-29 | End: 2024-05-02

## 2024-04-29 RX ORDER — OXYBUTYNIN CHLORIDE 5 MG
10 TABLET ORAL DAILY
Refills: 0 | Status: DISCONTINUED | OUTPATIENT
Start: 2024-04-29 | End: 2024-05-01

## 2024-04-29 RX ORDER — SENNA PLUS 8.6 MG/1
2 TABLET ORAL AT BEDTIME
Refills: 0 | Status: DISCONTINUED | OUTPATIENT
Start: 2024-04-29 | End: 2024-05-02

## 2024-04-29 RX ORDER — MAGNESIUM HYDROXIDE 400 MG/1
30 TABLET, CHEWABLE ORAL DAILY
Refills: 0 | Status: DISCONTINUED | OUTPATIENT
Start: 2024-04-29 | End: 2024-05-02

## 2024-04-29 RX ORDER — OXYBUTYNIN CHLORIDE 5 MG
1 TABLET ORAL
Refills: 0 | DISCHARGE

## 2024-04-29 RX ORDER — LISINOPRIL 2.5 MG/1
20 TABLET ORAL DAILY
Refills: 0 | Status: DISCONTINUED | OUTPATIENT
Start: 2024-04-30 | End: 2024-04-30

## 2024-04-29 RX ORDER — AZATHIOPRINE 100 MG/1
1 TABLET ORAL
Refills: 0 | DISCHARGE

## 2024-04-29 RX ORDER — RAMIPRIL 5 MG
1 CAPSULE ORAL
Refills: 0 | DISCHARGE

## 2024-04-29 RX ORDER — CELECOXIB 200 MG/1
200 CAPSULE ORAL EVERY 12 HOURS
Refills: 0 | Status: DISCONTINUED | OUTPATIENT
Start: 2024-04-30 | End: 2024-05-01

## 2024-04-29 RX ORDER — CHOLECALCIFEROL (VITAMIN D3) 125 MCG
1 CAPSULE ORAL
Qty: 0 | Refills: 0 | DISCHARGE

## 2024-04-29 RX ORDER — FOLIC ACID 0.8 MG
4 TABLET ORAL
Qty: 0 | Refills: 0 | DISCHARGE

## 2024-04-29 RX ORDER — HYDROMORPHONE HYDROCHLORIDE 2 MG/ML
0.5 INJECTION INTRAMUSCULAR; INTRAVENOUS; SUBCUTANEOUS
Refills: 0 | Status: DISCONTINUED | OUTPATIENT
Start: 2024-04-29 | End: 2024-05-02

## 2024-04-29 RX ORDER — ATORVASTATIN CALCIUM 80 MG/1
40 TABLET, FILM COATED ORAL AT BEDTIME
Refills: 0 | Status: DISCONTINUED | OUTPATIENT
Start: 2024-04-29 | End: 2024-04-29

## 2024-04-29 RX ADMIN — Medication 15 MILLIGRAM(S): at 18:18

## 2024-04-29 RX ADMIN — HYDROMORPHONE HYDROCHLORIDE 0.5 MILLIGRAM(S): 2 INJECTION INTRAMUSCULAR; INTRAVENOUS; SUBCUTANEOUS at 15:40

## 2024-04-29 RX ADMIN — OXYCODONE HYDROCHLORIDE 10 MILLIGRAM(S): 5 TABLET ORAL at 16:45

## 2024-04-29 RX ADMIN — CHLORHEXIDINE GLUCONATE 1 APPLICATION(S): 213 SOLUTION TOPICAL at 09:30

## 2024-04-29 RX ADMIN — GABAPENTIN 900 MILLIGRAM(S): 400 CAPSULE ORAL at 23:06

## 2024-04-29 RX ADMIN — SENNA PLUS 2 TABLET(S): 8.6 TABLET ORAL at 23:06

## 2024-04-29 RX ADMIN — POLYETHYLENE GLYCOL 3350 17 GRAM(S): 17 POWDER, FOR SOLUTION ORAL at 23:06

## 2024-04-29 RX ADMIN — OXYCODONE HYDROCHLORIDE 10 MILLIGRAM(S): 5 TABLET ORAL at 16:20

## 2024-04-29 RX ADMIN — HYDROMORPHONE HYDROCHLORIDE 0.5 MILLIGRAM(S): 2 INJECTION INTRAMUSCULAR; INTRAVENOUS; SUBCUTANEOUS at 15:25

## 2024-04-29 RX ADMIN — APREPITANT 40 MILLIGRAM(S): 80 CAPSULE ORAL at 09:31

## 2024-04-29 RX ADMIN — SODIUM CHLORIDE 100 MILLILITER(S): 9 INJECTION, SOLUTION INTRAVENOUS at 18:17

## 2024-04-29 RX ADMIN — LATANOPROST 1 DROP(S): 0.05 SOLUTION/ DROPS OPHTHALMIC; TOPICAL at 23:07

## 2024-04-29 RX ADMIN — OXYCODONE HYDROCHLORIDE 10 MILLIGRAM(S): 5 TABLET ORAL at 23:08

## 2024-04-29 RX ADMIN — Medication 1000 MILLIGRAM(S): at 09:29

## 2024-04-29 RX ADMIN — Medication 100 MILLIGRAM(S): at 19:10

## 2024-04-29 RX ADMIN — Medication 650 MILLIGRAM(S): at 18:18

## 2024-04-29 RX ADMIN — SODIUM CHLORIDE 100 MILLILITER(S): 9 INJECTION, SOLUTION INTRAVENOUS at 15:11

## 2024-04-29 NOTE — PHYSICAL THERAPY INITIAL EVALUATION ADULT - RANGE OF MOTION EXAMINATION, REHAB EVAL
R knee ~60 deg/bilateral upper extremity ROM was WFL (within functional limits)/bilateral lower extremity ROM was WFL (within functional limits)

## 2024-04-29 NOTE — PHYSICAL THERAPY INITIAL EVALUATION ADULT - GENERAL OBSERVATIONS, REHAB EVAL
Pt received semi supine in bed +IVL +SCD +prevena on R knee. ELIJAH Amin aware of intent to treat pt is POD #0 R TKR tolerated session well amb 15 ft x2 with RW CGA. pt was left as received with call bell in reach, VSS, and in NAD.

## 2024-04-29 NOTE — PROGRESS NOTE ADULT - SUBJECTIVE AND OBJECTIVE BOX
Ortho Post Op Check    Procedure: R tka  Surgeon: Oh    Pt comfortable without complaints, pain controlled  Denies CP, SOB, N/V, numbness/tingling     Vital Signs Last 24 Hrs  T(C): 36.1 (04-29-24 @ 16:35), Max: 36.3 (04-29-24 @ 15:00)  T(F): 97 (04-29-24 @ 16:35), Max: 97.4 (04-29-24 @ 15:00)  HR: 44 (04-29-24 @ 16:45) (44 - 56)  BP: 128/64 (04-29-24 @ 16:45) (126/66 - 159/73)  BP(mean): 91 (04-29-24 @ 16:45) (90 - 115)  RR: 18 (04-29-24 @ 16:45) (12 - 18)  SpO2: 100% (04-29-24 @ 16:45) (97% - 100%)  I&O's Summary    29 Apr 2024 07:01  -  29 Apr 2024 16:57  --------------------------------------------------------  IN: 200 mL / OUT: 0 mL / NET: 200 mL        General: Pt Alert and oriented, NAD  DSG C/D/I prevena  B/L LE: sensation intact, DP 2+, brisk cap refill, EHL/FHL/TA/GS 5/5              Post-op X-Ray: hardware intact, well aligned    A/P: 79yMale POD#0 s/p R TKA  - Stable  - Pain Control  - DVT ppx: xarelto pod 1  - Post op abx: ancef  - PT, WBS:  wbat     Ortho Pager [phone]

## 2024-04-29 NOTE — PHYSICAL THERAPY INITIAL EVALUATION ADULT - PERTINENT HX OF CURRENT PROBLEM, REHAB EVAL
79yoM with right knee pain x 4 years without inciting accident or injury. He takes Celebrex and Cosamine at home for pain with some relief. Pain persists despite conservative measures. He endorses bilateral neuropathy in both feet for which he originally needed to use a cane for but has now progressed to a walker.

## 2024-04-29 NOTE — PHYSICAL THERAPY INITIAL EVALUATION ADULT - ADDITIONAL COMMENTS
Pt states he lives with his wife in elevator building. 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

## 2024-04-30 ENCOUNTER — TRANSCRIPTION ENCOUNTER (OUTPATIENT)
Age: 79
End: 2024-04-30

## 2024-04-30 LAB
ANION GAP SERPL CALC-SCNC: 7 MMOL/L — SIGNIFICANT CHANGE UP (ref 5–17)
BUN SERPL-MCNC: 35 MG/DL — HIGH (ref 7–23)
CALCIUM SERPL-MCNC: 9.1 MG/DL — SIGNIFICANT CHANGE UP (ref 8.4–10.5)
CHLORIDE SERPL-SCNC: 104 MMOL/L — SIGNIFICANT CHANGE UP (ref 96–108)
CO2 SERPL-SCNC: 25 MMOL/L — SIGNIFICANT CHANGE UP (ref 22–31)
CREAT SERPL-MCNC: 1.16 MG/DL — SIGNIFICANT CHANGE UP (ref 0.5–1.3)
EGFR: 64 ML/MIN/1.73M2 — SIGNIFICANT CHANGE UP
GLUCOSE SERPL-MCNC: 132 MG/DL — HIGH (ref 70–99)
HCT VFR BLD CALC: 30.5 % — LOW (ref 39–50)
HGB BLD-MCNC: 10.2 G/DL — LOW (ref 13–17)
MCHC RBC-ENTMCNC: 33.1 PG — SIGNIFICANT CHANGE UP (ref 27–34)
MCHC RBC-ENTMCNC: 33.4 GM/DL — SIGNIFICANT CHANGE UP (ref 32–36)
MCV RBC AUTO: 99 FL — SIGNIFICANT CHANGE UP (ref 80–100)
NRBC # BLD: 0 /100 WBCS — SIGNIFICANT CHANGE UP (ref 0–0)
PLATELET # BLD AUTO: 177 K/UL — SIGNIFICANT CHANGE UP (ref 150–400)
POTASSIUM SERPL-MCNC: 4.7 MMOL/L — SIGNIFICANT CHANGE UP (ref 3.5–5.3)
POTASSIUM SERPL-SCNC: 4.7 MMOL/L — SIGNIFICANT CHANGE UP (ref 3.5–5.3)
RBC # BLD: 3.08 M/UL — LOW (ref 4.2–5.8)
RBC # FLD: 12.7 % — SIGNIFICANT CHANGE UP (ref 10.3–14.5)
SODIUM SERPL-SCNC: 136 MMOL/L — SIGNIFICANT CHANGE UP (ref 135–145)
WBC # BLD: 10.47 K/UL — SIGNIFICANT CHANGE UP (ref 3.8–10.5)
WBC # FLD AUTO: 10.47 K/UL — SIGNIFICANT CHANGE UP (ref 3.8–10.5)

## 2024-04-30 PROCEDURE — 99223 1ST HOSP IP/OBS HIGH 75: CPT

## 2024-04-30 RX ORDER — SODIUM CHLORIDE 9 MG/ML
1000 INJECTION INTRAMUSCULAR; INTRAVENOUS; SUBCUTANEOUS ONCE
Refills: 0 | Status: COMPLETED | OUTPATIENT
Start: 2024-04-30 | End: 2024-04-30

## 2024-04-30 RX ORDER — POLYETHYLENE GLYCOL 3350 17 G/17G
17 POWDER, FOR SOLUTION ORAL
Qty: 0 | Refills: 0 | DISCHARGE

## 2024-04-30 RX ORDER — CELECOXIB 200 MG/1
1 CAPSULE ORAL
Qty: 0 | Refills: 0 | DISCHARGE

## 2024-04-30 RX ORDER — RIVAROXABAN 15 MG-20MG
1 KIT ORAL
Qty: 0 | Refills: 0 | DISCHARGE
Start: 2024-04-30

## 2024-04-30 RX ORDER — ASPIRIN/CALCIUM CARB/MAGNESIUM 324 MG
1 TABLET ORAL
Qty: 0 | Refills: 0 | DISCHARGE
Start: 2024-04-30

## 2024-04-30 RX ORDER — TRAMADOL HYDROCHLORIDE 50 MG/1
25 TABLET ORAL EVERY 4 HOURS
Refills: 0 | Status: DISCONTINUED | OUTPATIENT
Start: 2024-04-30 | End: 2024-05-02

## 2024-04-30 RX ORDER — TRAMADOL HYDROCHLORIDE 50 MG/1
50 TABLET ORAL EVERY 4 HOURS
Refills: 0 | Status: DISCONTINUED | OUTPATIENT
Start: 2024-04-30 | End: 2024-05-02

## 2024-04-30 RX ORDER — PANTOPRAZOLE SODIUM 20 MG/1
1 TABLET, DELAYED RELEASE ORAL
Qty: 0 | Refills: 0 | DISCHARGE

## 2024-04-30 RX ORDER — ACETAMINOPHEN 500 MG
975 TABLET ORAL EVERY 8 HOURS
Refills: 0 | Status: DISCONTINUED | OUTPATIENT
Start: 2024-04-30 | End: 2024-05-02

## 2024-04-30 RX ORDER — TRAMADOL HYDROCHLORIDE 50 MG/1
1 TABLET ORAL
Qty: 0 | Refills: 0 | DISCHARGE

## 2024-04-30 RX ORDER — RIVAROXABAN 15 MG-20MG
1 KIT ORAL
Qty: 0 | Refills: 0 | DISCHARGE

## 2024-04-30 RX ORDER — CEFPODOXIME PROXETIL 100 MG
200 TABLET ORAL EVERY 12 HOURS
Refills: 0 | Status: DISCONTINUED | OUTPATIENT
Start: 2024-04-30 | End: 2024-05-02

## 2024-04-30 RX ORDER — ACETAMINOPHEN 500 MG
2 TABLET ORAL
Qty: 0 | Refills: 0 | DISCHARGE
Start: 2024-04-30

## 2024-04-30 RX ORDER — LISINOPRIL 2.5 MG/1
20 TABLET ORAL DAILY
Refills: 0 | Status: DISCONTINUED | OUTPATIENT
Start: 2024-05-01 | End: 2024-05-01

## 2024-04-30 RX ORDER — CELECOXIB 200 MG/1
1 CAPSULE ORAL
Qty: 0 | Refills: 0 | DISCHARGE
Start: 2024-04-30

## 2024-04-30 RX ADMIN — POLYETHYLENE GLYCOL 3350 17 GRAM(S): 17 POWDER, FOR SOLUTION ORAL at 21:32

## 2024-04-30 RX ADMIN — Medication 15 MILLIGRAM(S): at 05:28

## 2024-04-30 RX ADMIN — FINASTERIDE 5 MILLIGRAM(S): 5 TABLET, FILM COATED ORAL at 12:57

## 2024-04-30 RX ADMIN — OXYCODONE HYDROCHLORIDE 10 MILLIGRAM(S): 5 TABLET ORAL at 00:00

## 2024-04-30 RX ADMIN — CELECOXIB 200 MILLIGRAM(S): 200 CAPSULE ORAL at 17:07

## 2024-04-30 RX ADMIN — Medication 650 MILLIGRAM(S): at 05:28

## 2024-04-30 RX ADMIN — Medication 975 MILLIGRAM(S): at 21:32

## 2024-04-30 RX ADMIN — SODIUM CHLORIDE 500 MILLILITER(S): 9 INJECTION INTRAMUSCULAR; INTRAVENOUS; SUBCUTANEOUS at 16:02

## 2024-04-30 RX ADMIN — Medication 15 MILLIGRAM(S): at 13:18

## 2024-04-30 RX ADMIN — GABAPENTIN 900 MILLIGRAM(S): 400 CAPSULE ORAL at 21:32

## 2024-04-30 RX ADMIN — Medication 650 MILLIGRAM(S): at 00:18

## 2024-04-30 RX ADMIN — RIVAROXABAN 10 MILLIGRAM(S): KIT at 12:57

## 2024-04-30 RX ADMIN — Medication 975 MILLIGRAM(S): at 13:19

## 2024-04-30 RX ADMIN — LATANOPROST 1 DROP(S): 0.05 SOLUTION/ DROPS OPHTHALMIC; TOPICAL at 21:31

## 2024-04-30 RX ADMIN — Medication 10 MILLIGRAM(S): at 12:57

## 2024-04-30 RX ADMIN — Medication 200 MILLIGRAM(S): at 17:07

## 2024-04-30 RX ADMIN — Medication 100 MILLIGRAM(S): at 04:12

## 2024-04-30 RX ADMIN — SENNA PLUS 2 TABLET(S): 8.6 TABLET ORAL at 21:32

## 2024-04-30 RX ADMIN — Medication 15 MILLIGRAM(S): at 00:18

## 2024-04-30 RX ADMIN — Medication 81 MILLIGRAM(S): at 12:57

## 2024-04-30 RX ADMIN — ATORVASTATIN CALCIUM 20 MILLIGRAM(S): 80 TABLET, FILM COATED ORAL at 21:32

## 2024-04-30 RX ADMIN — CELECOXIB 200 MILLIGRAM(S): 200 CAPSULE ORAL at 05:28

## 2024-04-30 RX ADMIN — AZATHIOPRINE 50 MILLIGRAM(S): 100 TABLET ORAL at 12:57

## 2024-04-30 RX ADMIN — CYCLOSPORINE 25 MILLIGRAM(S): 100 CAPSULE ORAL at 13:11

## 2024-04-30 NOTE — OCCUPATIONAL THERAPY INITIAL EVALUATION ADULT - ADDITIONAL COMMENTS
Pt lives w/ his wife in condo, no stairs to negotiate. Pt states that he was independent prior to admission, w/o prior use of AEs/DMEs. Pt lives w/ his wife in condo, no stairs to negotiate. Pt states that he was independent prior to admission. Pt has a commode from previous surgery.

## 2024-04-30 NOTE — CONSULT NOTE ADULT - SUBJECTIVE AND OBJECTIVE BOX
HPI "79yoM with right knee pain x 4 years without inciting accident or injury. He takes Celebrex and Cosamine at home for pain with some relief. Pain persists despite conservative measures. He endorses bilateral neuropathy in both feet for which he originally needed to use a cane for but has now progressed to a walker.     Last dose xarelto 4/24/24. Hx of left leg DVT   s/p Left TKA about 20 years ago, course c/b PJI    Presents today for elective right total knee replacement with Dr. Hodges (26 Apr 2024 09:38)"    79M w eczema, CAD s/p stents, HTN, h/o DVT in LLE, dysequilibrium, L TKA c/b PJI, here w chronic R knee pain, s/p elective R TKR w Dr. Hodges 4/29, for HPT    Pt seated in chair - states pain is controlled. Very pleased w result of surgery and feels his ROM in R knee is great. Denies any LH/dizziness, chest pain when mobilizing. No fevers, cough, sore throat. Voiding wo dysuria. +Flatus. Eating wo N/V/Abd pain.    ROS: 12 point ROS reviewed and otherwise negative per HPI  FH: DVT in LLE.  SH: Non smoker    PAST MEDICAL & SURGICAL HISTORY:  Cardiac angina      Hypertension      BPH (benign prostatic hyperplasia)      BBB (bundle branch block)  right      LISA (obstructive sleep apnea)      Vasovagal syndrome      Iliac aneurysm      Carotid stenosis, right      Neuropathy      Pituitary adenoma      Hernia      DVT (deep venous thrombosis)      CAD (coronary artery disease)  5 stents      Eczema      Breast lump      S/P angioplasty      History of coronary artery stent placement  x 5 stents      History of total knee replacement, left      History of strabismus surgery      History of carpal tunnel release of both wrists      History of shoulder surgery  left      History of vasectomy      H/O hernia repair      Elective surgery  colon polyps removal      Elective surgery  prostate ablation      Elective surgery  ivc filter1/14/2020      Breast lump  removed          Home Meds: Home Medications:  acetaminophen 325 mg oral tablet: 2 tab(s) orally every 6 hours (30 Apr 2024 10:11)  aspirin 81 mg oral tablet, chewable: 1 tab(s) orally once a day (30 Apr 2024 10:11)  azaTHIOprine 50 mg oral tablet: 1 tab(s) orally once a day (29 Apr 2024 16:52)  cefadroxil 500 mg oral capsule: 1 cap(s) orally 2 times a day (30 Apr 2024 10:11)  celecoxib 200 mg oral capsule: 1 cap(s) orally every 12 hours (30 Apr 2024 10:11)  Citrucel 500 mg oral tablet: 4 tab(s) orally once a day (29 Apr 2024 09:10)  Cosamin  mg-400 mg oral tablet: 1 tab(s) orally once a day (29 Apr 2024 09:10)  cycloSPORINE 25 mg oral capsule: 1 cap(s) orally once a day (29 Apr 2024 09:10)  ezetimibe 10 mg oral tablet: 1 tab(s) orally once a day (29 Apr 2024 16:09)  folic acid 1 mg oral tablet: 4 tab(s) orally once a day (29 Apr 2024 09:10)  gabapentin 300 mg oral tablet: 3 orally once a day (29 Apr 2024 16:52)  latanoprost 0.005% ophthalmic solution: 1 drop(s) to each affected eye once a day (in the evening) (29 Apr 2024 09:10)  oxyBUTYnin 10 mg/24 hr oral tablet, extended release: 1 tab(s) orally once a day (29 Apr 2024 16:50)  pantoprazole 40 mg oral delayed release tablet: 1 tab(s) orally once a day (30 Apr 2024 10:11)  Proscar 5 mg oral tablet: tab(s) orally once a day (29 Apr 2024 09:10)  ramipril 5 mg oral capsule: 1 cap(s) orally once a day (29 Apr 2024 16:53)  rivaroxaban 10 mg oral tablet: 1 tab(s) orally once a day (30 Apr 2024 10:11)  sulfamethoxazole-trimethoprim 200 mg-40 mg/5 mL oral suspension: 20 milliliter(s) orally once a day (30 Apr 2024 10:55)  traMADol 50 mg oral tablet: 1 tab(s) orally every 4 to 6 hours as needed for  severe pain (30 Apr 2024 10:11)  Vitamin C 500 mg oral tablet: 1 tab(s) orally once a day (29 Apr 2024 09:10)  Vitamin D3 50 mcg (2000 intl units) oral tablet: 1 tab(s) orally once a day (29 Apr 2024 09:10)    Allergies: Allergies    rifampin (Rash)  Levaquin (Rash)    Intolerances      Soc:   Advanced Directives: Presumed Full Code     CURRENT MEDICATIONS:   --------------------------------------------------------------------------------------  Neurologic Medications  acetaminophen     Tablet .. 975 milliGRAM(s) Oral every 8 hours  celecoxib 200 milliGRAM(s) Oral every 12 hours  gabapentin 900 milliGRAM(s) Oral at bedtime  HYDROmorphone  Injectable 0.5 milliGRAM(s) IV Push every 15 minutes PRN breakthrough  ondansetron Injectable 4 milliGRAM(s) IV Push every 6 hours PRN Nausea and/or Vomiting  traMADol 50 milliGRAM(s) Oral every 4 hours PRN Severe Pain (7 - 10)  traMADol 25 milliGRAM(s) Oral every 4 hours PRN Moderate Pain (4 - 6)    Respiratory Medications    Cardiovascular Medications  lisinopril 20 milliGRAM(s) Oral daily    Gastrointestinal Medications  lactated ringers. 1000 milliLiter(s) IV Continuous <Continuous>  magnesium hydroxide Suspension 30 milliLiter(s) Oral daily PRN Constipation  polyethylene glycol 3350 17 Gram(s) Oral at bedtime  senna 2 Tablet(s) Oral at bedtime    Genitourinary Medications  oxybutynin 10 milliGRAM(s) Oral daily    Hematologic/Oncologic Medications  aspirin  chewable 81 milliGRAM(s) Oral daily  azaTHIOprine 50 milliGRAM(s) Oral daily  cycloSPORINE  (SandIMMUNE) 25 milliGRAM(s) Oral daily  rivaroxaban 10 milliGRAM(s) Oral daily    Antimicrobial/Immunologic Medications  cefpodoxime 200 milliGRAM(s) Oral every 12 hours  trimethoprim  40 mG/sulfamethoxazole 200 mG Suspension 20 milliLiter(s) Oral <User Schedule>    Endocrine/Metabolic Medications  atorvastatin 20 milliGRAM(s) Oral at bedtime  finasteride 5 milliGRAM(s) Oral daily    Topical/Other Medications  latanoprost 0.005% Ophthalmic Solution 1 Drop(s) Both EYES at bedtime  povidone iodine 5% Nasal Swab 1 Application(s) Both Nostrils once    --------------------------------------------------------------------------------------    VITAL SIGNS, INS/OUTS (last 24 hours):  --------------------------------------------------------------------------------------  ICU Vital Signs Last 24 Hrs  T(C): 36.6 (30 Apr 2024 08:19), Max: 36.7 (30 Apr 2024 00:17)  T(F): 97.8 (30 Apr 2024 08:19), Max: 98 (30 Apr 2024 00:17)  HR: 57 (30 Apr 2024 12:00) (44 - 79)  BP: 99/57 (30 Apr 2024 12:00) (84/45 - 159/73)  BP(mean): 95 (29 Apr 2024 17:00) (90 - 115)  ABP: --  ABP(mean): --  RR: 18 (30 Apr 2024 08:19) (12 - 19)  SpO2: 98% (30 Apr 2024 08:19) (97% - 100%)    O2 Parameters below as of 30 Apr 2024 08:19  Patient On (Oxygen Delivery Method): room air          I&O's Summary    29 Apr 2024 07:01  -  30 Apr 2024 07:00  --------------------------------------------------------  IN: 1870 mL / OUT: 825 mL / NET: 1045 mL      --------------------------------------------------------------------------------------    EXAM:  GEN: Male in NAD on RA  HEENT: NC/AT, MMM  CV: RRR, nml S1S2, no murmurs  PULM: nml effort, CTAB  ABD: Soft, non-distended, NABS, non-tender  NEURO  A/O x3, moving all extremities, Sensation intact  PSYCH: Appropriate      LABS  --------------------------------------------------------------------------------------  Labs:  CAPILLARY BLOOD GLUCOSE                              10.2   10.47 )-----------( 177      ( 30 Apr 2024 05:30 )             30.5         04-30    136  |  104  |  35<H>  ----------------------------<  132<H>  4.7   |  25  |  1.16      Calcium: 9.1 mg/dL (04-30-24 @ 05:30)      LFTs:         Coags:     12.2   ----< 1.07    ( 29 Apr 2024 10:30 )     39.9                Urinalysis Basic - ( 30 Apr 2024 05:30 )    Color: x / Appearance: x / SG: x / pH: x  Gluc: 132 mg/dL / Ketone: x  / Bili: x / Urobili: x   Blood: x / Protein: x / Nitrite: x   Leuk Esterase: x / RBC: x / WBC x   Sq Epi: x / Non Sq Epi: x / Bacteria: x          --------------------------------------------------------------------------------------    OTHER LABS    IMAGING RESULTS  ****************

## 2024-04-30 NOTE — OCCUPATIONAL THERAPY INITIAL EVALUATION ADULT - RANGE OF MOTION EXAMINATION, LOWER EXTREMITY
LLE AROM, PROM at WFL, WNL; R hip lAROM limited, R knee AROM impaired (post op), R ankle AROM, PROM limited

## 2024-04-30 NOTE — DISCHARGE NOTE NURSING/CASE MANAGEMENT/SOCIAL WORK - PATIENT PORTAL LINK FT
You can access the FollowMyHealth Patient Portal offered by [facility] by registering at the following website: [email or web address]. By joining [employer]’s FollowMyHealth portal, you will also be able to view your health information using other applications (apps) compatible with our system.

## 2024-04-30 NOTE — DISCHARGE NOTE PROVIDER - NSDCFUADDINST_GEN_ALL_CORE_FT
Please follow Dr. Lee**’s instruction sheets (IF APPLICABLE)   ACTIVITY:   - Weight bear as tolerated with assistive device. No strenuous activity, heavy lifting, driving or returning to work until cleared by MD.   - Apply a cold compress to the surgical site several times daily to reduce pain and swelling. For icing, twenty-minute sessions followed by an hour off is recommended. You should ice as frequently as possible. Ice should NEVER be placed directly on the skin. Wearing compression stockings during the first week after surgery can help reduce swelling in your knee, calf and foot, but is not required.      (KNEE REPLACEMENTS)   DO place a pillow under your heel when elevating the leg, to encourage full extension of the knee. Do NOT place a pillow behind your knee for comfort, as this can lead to permanent difficulty straightening your leg. It is normal to develop some swelling in the leg, ankle, and foot because of gravity.     (POSTERIOR HIP REPLACEMENTS)   Hip precautions:   The following precautions will remain in effect for 6 weeks following surgery:   · Do not cross your knees.   · Do not flex your hip (i.e., bring your knee toward your chest) beyond 90 degrees.   · Use a raised toilet seat. Do not use low chairs or couches.   · Sleep with a pillow between your knees.   · Do not reach down to  items on the floor.     (ANTERIOR HIP REPLACEMENTS)   Hip precautions:   · There are no specific range of motion precautions required with this approach to hip replacement.   · A raised toilet seat is not required, although you may find it easier to use one.   · You do not need to sleep with a pillow between your legs.     DRESSING/SHOWERING:   (AQUACEL – brown gel dressing)   - You may shower, your dressing is water-resistant. Do not soak in bathtubs. Remove dressing after postop day 7, then leave incision open to air. You may do this yourself (simply peel it off), or you may ask for assistance from your visiting nurse. Keep your incision clean and dry. Do not pick at your incision. Do not apply creams, ointments or oils to your incision until cleared by your surgeon. Do not soak your incision in sitting water (ie tubs, pools, lakes, etc.) until cleared by your surgeon. Do not scrub the incision – instead, allow soap and water to flow over the incision and then pat it dry with a clean towel.     (PREVENA or MANUEL – incisional wound vac)   - You have an incisional wound vac dressing with tubing to attached canister/battery pack. You may shower but must keep battery pack dry at all times. The battery dies in 7 days then can remove dressing and leave open to air. Keep your incision clean and dry. Do not pick at your incision. Do not apply creams, ointments or oils to your incision until cleared by your surgeon. Do not soak your incision in sitting water (ie tubs, pools, lakes, etc.) until cleared by your surgeon. Do not scrub the incision – instead, allow soap and water to flow over the incision and then pat it dry with a clean towel.       (STAPLES/SUTURES/STERI-STRIPS)     -Change dressing daily until post-op day 5. Sponge bathe until post-op day 5 then may take full shower. Once dressing removed keep incision clean and dry. Do not pick at your incision. Do not apply creams, ointments or oils to your incision until cleared by your surgeon. Do not soak your incision in sitting water (ie tubs, pools, lakes, etc.) until cleared by your surgeon.    MEDICATION/ANTICOAGULATION:   -You have been prescribed Xarelto 10mg daily, as a preventative to help prevent postoperative blood clots. Please take this medication as prescribed.    - You have been prescribed medications for pain:     - Tylenol for mild to moderate pain. Do not exceed 3,000mg daily.     - For more severe pain, take Tylenol with the addition of narcotic pain medication. Take this medication as prescribed. This medication may cause drowsiness or dizziness. Do not operate machinery. This medication may cause constipation.   - For any additional medications, follow instructions on the bottle.    -Try to have regular bowel movements. Take stool softener or laxative if necessary. You may wish to take Miralax daily until you have regular bowel movements.    - If you have been prescribed Aspirin or an anti-inflammatory, please take prilosec (omeprazole) once a day, before breakfast, until no longer taking Aspirin or anti-inflammatory. This will help protect your stomach.   - If you have a pain management physician, please follow-up with them postoperatively.    - If you experience any negative side effects of your medications, please call your surgeon's office to discuss.      FOLLOW-UP:   - Call to schedule an appt with Dr. Hodges for follow up.   - Please follow-up with your primary care physician or any other specialist you see postoperatively, if needed.    - Contact your doctor or go to the emergency room if you experience: fever greater than 101.5, chills, chest pain, difficulty breathing, redness or excessive drainage around the incision, other concerns.  Please follow Dr. Hodges’s instruction sheets      ACTIVITY:   - Weight bear as tolerated with assistive device. No strenuous activity, heavy lifting, driving or returning to work until cleared by MD.   - Apply a cold compress to the surgical site several times daily to reduce pain and swelling. For icing, twenty-minute sessions followed by an hour off is recommended. You should ice as frequently as possible. Ice should NEVER be placed directly on the skin. Wearing compression stockings during the first week after surgery can help reduce swelling in your knee, calf and foot, but is not required.      (KNEE REPLACEMENTS)   DO place a pillow under your heel when elevating the leg, to encourage full extension of the knee. Do NOT place a pillow behind your knee for comfort, as this can lead to permanent difficulty straightening your leg. It is normal to develop some swelling in the leg, ankle, and foot because of gravity.     DRESSING/SHOWERING: PRINEO (mesh with glue) and an Aquacel overlying  AQUACEL – brown gel dressing  - You may shower, your dressing is water-resistant. Do not soak in bathtubs. Remove dressing after postop day 7, then leave incision open to air. You may do this yourself (simply peel it off), or you may ask for assistance from your visiting nurse. Keep your incision clean and dry. Do not pick at your incision. Do not apply creams, ointments or oils to your incision until cleared by your surgeon. Do not soak your incision in sitting water (ie tubs, pools, lakes, etc.) until cleared by your surgeon. Do not scrub the incision – instead, allow soap and water to flow over the incision and then pat it dry with a clean towel.     MEDICATION/ANTICOAGULATION:   -You have been prescribed Xarelto 10mg daily, as a preventative to help prevent postoperative blood clots. Please take this medication as prescribed.    - You have been prescribed medications for pain:     - Tylenol for mild to moderate pain. Do not exceed 3,000mg daily.     - For more severe pain, take Tylenol with the addition of narcotic pain medication. Take this medication as prescribed. This medication may cause drowsiness or dizziness. Do not operate machinery. This medication may cause constipation.   - For any additional medications, follow instructions on the bottle.    -Try to have regular bowel movements. Take stool softener or laxative if necessary. You may wish to take Miralax daily until you have regular bowel movements.    - If you have been prescribed Aspirin or an anti-inflammatory, please take prilosec (omeprazole) once a day, before breakfast, until no longer taking Aspirin or anti-inflammatory. This will help protect your stomach.   - If you have a pain management physician, please follow-up with them postoperatively.    - If you experience any negative side effects of your medications, please call your surgeon's office to discuss.      FOLLOW-UP:   - Call to schedule an appt with Dr. Hodges for follow up.   - Please follow-up with your primary care physician or any other specialist you see postoperatively, if needed.    - Contact your doctor or go to the emergency room if you experience: fever greater than 101.5, chills, chest pain, difficulty breathing, redness or excessive drainage around the incision, other concerns.  Please see Dr. Hodges's separate discharge instructions sheet. Your medications were sent to [facility], located on the first floor of [facility]. Take medications as prescribed.    You had an MAGGY (acute urinary retention) in the hospital which was likely related to dehydration and urinary retention. You were given IVF, and celebrex(NSAID) and your home dose of ramipril were held. You were catheterized once. Your creatinine improved by the next morning, and your urinary retention resolved. You also had borderline low sodium (131) on discharge. Stay well hydrated and eat a well-balanced diet. Follow up with your primary care doctor in 1-2 weeks for repeat blood work to assess your BMP (creatinine and sodium). You may resume taking celebrex and ramipril in 1 week.      ACTIVITY:   - Weight bear as tolerated with assistive device. No strenuous activity, heavy lifting, driving or returning to work until cleared by MD.   - Apply a cold compress to the surgical site several times daily to reduce pain and swelling. For icing, twenty-minute sessions followed by an hour off is recommended. You should ice as frequently as possible. Ice should NEVER be placed directly on the skin. Wearing compression stockings during the first week after surgery can help reduce swelling in your knee, calf and foot, but is not required.      (KNEE REPLACEMENTS)   DO place a pillow under your heel when elevating the leg, to encourage full extension of the knee. Do NOT place a pillow behind your knee for comfort, as this can lead to permanent difficulty straightening your leg. It is normal to develop some swelling in the leg, ankle, and foot because of gravity.     DRESSING/SHOWERING: PRINEO (mesh with glue) and an Aquacel overlying  AQUACEL – brown gel dressing  - You may shower, your dressing is water-resistant. Do not soak in bathtubs. Remove dressing after postop day 7, then leave incision open to air. You may do this yourself (simply peel it off), or you may ask for assistance from your visiting nurse. Keep your incision clean and dry. Do not pick at your incision. Do not apply creams, ointments or oils to your incision until cleared by your surgeon. Do not soak your incision in sitting water (ie tubs, pools, lakes, etc.) until cleared by your surgeon. Do not scrub the incision – instead, allow soap and water to flow over the incision and then pat it dry with a clean towel.     MEDICATION/ANTICOAGULATION:   -You have been prescribed Xarelto 10mg daily, as a preventative to help prevent postoperative blood clots. Please take this medication as prescribed.    - You have been prescribed medications for pain:     - Tylenol for mild to moderate pain. Do not exceed 3,000mg daily.     - For more severe pain, take Tylenol with the addition of narcotic pain medication. Take this medication as prescribed. This medication may cause drowsiness or dizziness. Do not operate machinery. This medication may cause constipation.   - For any additional medications, follow instructions on the bottle.    -Try to have regular bowel movements. Take stool softener or laxative if necessary. You may wish to take Miralax daily until you have regular bowel movements.    - If you have been prescribed Aspirin or an anti-inflammatory, please take prilosec (omeprazole) once a day, before breakfast, until no longer taking Aspirin or anti-inflammatory. This will help protect your stomach.   - If you have a pain management physician, please follow-up with them postoperatively.    - If you experience any negative side effects of your medications, please call your surgeon's office to discuss.      FOLLOW-UP:   - Call to schedule an appt with Dr. Hodges for follow up.   - Please follow-up with your primary care physician or any other specialist you see postoperatively, if needed.    - Contact your doctor or go to the emergency room if you experience: fever greater than 101.5, chills, chest pain, difficulty breathing, redness or excessive drainage around the incision, other concerns.

## 2024-04-30 NOTE — CONSULT NOTE ADULT - ASSESSMENT
79M w eczema, CAD s/p stents, HTN, h/o DVT in LLE, dysequilibrium, L TKA c/b PJI, here w chronic R knee pain, s/p elective R TKR w Dr. Hodges 4/29, for HPT    #Post-op state - pain controlled. PPx: Xarelto 10mg daily. On bowel regimen and incentive spirometer  #R Knee OA    - Tylenol 1g q8, celebrex 200 BID, gabapentin 300mg q8h   - PRN: Tramadol 2550 q4 for mod/severe pain    #Acute blood loss anemia - 10.2 from 13. Asymptomatic    #Eczema - home on azathioprine 50 daily, cyclosporin 25mg daily, TMP-SMX MWF - liquid for ppx  #CAD s/p stents - on asa 81 daily, zetia 10mg daily, atorva 20mg daily  #HTN - home on ramipril 5mg daily,   #H/O DVT in LEFT leg - on Xarelto 10mg for ppx  #H/O dysequilibrium  #BPH - on finasteride  #Obesity - BMI 30. Affects all aspects of care    Plan  Doing well   Would confirm if gabapentin 300 q8h vs 900mg HS   Otherwise optimized for DC    DISPO: HPT   Above d/w Ortho

## 2024-04-30 NOTE — DISCHARGE NOTE PROVIDER - HOSPITAL COURSE
Admit to Orthopaedics  Surgery right total knee arthroplasty  Perioperative Antibiotics  DVT prophylaxis, resuming home xarelto   Physical Therapy and out of bed to chair  Pain Management  Medicine Comanagement Admit to Orthopaedics  Surgery right total knee arthroplasty  Perioperative Antibiotics  DVT prophylaxis, resuming home xarelto   Physical Therapy and out of bed to chair  Pain Management  Medicine Comanagement     Inpatient Events:  4/30: Orthostatic hypotension. Given 1L NS bolus. Lisinopril held  5/1: MAGGY. Given 500cc fluid bolus. Bladder scan ~500, retaining urine. Straight cath x1. Lisinopril continued to be held. Celebrex and oxybutynin discontinued.  5/2: Creatinine improved. Voiding with low PVR (123), retention resolved. Cleared PT for dc home. Plan to continue to hold celebrex for 1 week on discharge. Admit to Orthopaedics  Surgery right total knee arthroplasty  Perioperative Antibiotics  DVT prophylaxis, resuming home xarelto   Physical Therapy and out of bed to chair  Pain Management  Medicine Comanagement     Inpatient Events:  4/30: Orthostatic hypotension. Given 1L NS bolus. Lisinopril held  5/1: MAGGY. Given 500cc fluid bolus. Bladder scan ~500, retaining urine. Straight cath x1. Lisinopril continued to be held. Celebrex and oxybutynin discontinued.  5/2: Creatinine improved. Voiding with low PVR (123), retention resolved. Cleared PT for dc home. Plan to continue to hold celebrex and ramipril for 1 week on discharge.

## 2024-04-30 NOTE — OCCUPATIONAL THERAPY INITIAL EVALUATION ADULT - GENERAL OBSERVATIONS, REHAB EVAL
Pt's RN Rosemary aware of intent to eval/tx; cleared Pt. Pt received in supine - +telemetry, heplock, R knee kryo -[facility]. Pt agreeable to OT.

## 2024-04-30 NOTE — PROGRESS NOTE ADULT - SUBJECTIVE AND OBJECTIVE BOX
Ortho Progress Note     Procedure: R tka  Surgeon: Oh    Pt comfortable without complaints, pain controlled. R knee pain well controlled, no numbness/tingling on RLE endorsed   Denies CP, SOB, N/V, numbness/tingling     Vital Signs Last 24 Hrs  T(C): 36.3 (30 Apr 2024 05:25), Max: 36.7 (29 Apr 2024 10:16)  T(F): 97.3 (30 Apr 2024 05:25), Max: 98 (30 Apr 2024 00:17)  HR: 63 (30 Apr 2024 05:25) (44 - 79)  BP: 115/61 (30 Apr 2024 05:25) (105/55 - 159/73)  BP(mean): 95 (29 Apr 2024 17:00) (90 - 115)  RR: 19 (30 Apr 2024 05:25) (12 - 19)  SpO2: 100% (30 Apr 2024 05:25) (97% - 100%)    Parameters below as of 30 Apr 2024 05:25  Patient On (Oxygen Delivery Method): room air            General: Pt Alert and oriented, NAD  DSG C/D/I prevena  SILT sural/saph/dpn/spn/tib   5/5 EHL/FHL/TA/GS b/l           A/P: 79yMale  s/p R TKA by Dr Hodges on 4/30  - Stable  - Pain Control  - DVT ppx: xarelto pod 1  - Post op abx: ancef  - PT, WBS:  wbat   - Dispo: HPT pending clearance     Ortho Pager [phone]

## 2024-04-30 NOTE — DISCHARGE NOTE PROVIDER - NSDCCPCAREPLAN_GEN_ALL_CORE_FT
PRINCIPAL DISCHARGE DIAGNOSIS  Diagnosis: Osteoarthritis of right knee  Assessment and Plan of Treatment: right total knee replacement

## 2024-04-30 NOTE — PROGRESS NOTE ADULT - SUBJECTIVE AND OBJECTIVE BOX
Ortho Note    Pt seen and examined. Comfortable without complaints, pain controlled  Denies CP, SOB, N/V, numbness/tingling. Reports has balance issues at baseline, but has felt equivalent  to baseline ambulating with assist in hospital. R knee ROM 0-90 in seated position. Feels well today.     Vital Signs Last 24 Hrs  T(C): 36.6 (04-30-24 @ 08:19), Max: 36.6 (04-30-24 @ 08:19)  T(F): 97.8 (04-30-24 @ 08:19), Max: 97.8 (04-30-24 @ 08:19)  HR: 70 (04-30-24 @ 08:19) (63 - 70)  BP: 110/58 (04-30-24 @ 08:19) (110/58 - 115/61)  BP(mean): --  RR: 18 (04-30-24 @ 08:19) (18 - 19)  SpO2: 98% (04-30-24 @ 08:19) (98% - 100%)  AVSS    General: Pt Alert and oriented, NAD  DSG- prineo/ aquacel C/D/I  Pulses: +2DP, WWP feet  Sensation: SILT BLE  Motor: 5/5 EHL/FHL/TA/GS BLE                          10.2   10.47 )-----------( 177      ( 30 Apr 2024 05:30 )             30.5     04-30    136  |  104  |  35<H>  ----------------------------<  132<H>  4.7   |  25  |  1.16    Ca    9.1      30 Apr 2024 05:30        A/P: 79yMale POD#1 s/p right total knee replacement  - VSS, Labs WNL  - Pain Control  - DVT ppx: home dose xarelto 10mg PO daily, asa 81mg daily  - PT, WBS: WBAT, OT consult  - OOB for meals, I/S  - bowel regimen  - dispo: home with HPT pending PT clearance    Ortho Pager [phone] Ortho Note    Pt seen and examined. Comfortable without complaints, pain controlled  Denies CP, SOB, N/V, numbness/tingling. Reports has balance issues at baseline, but has felt equivalent  to baseline ambulating with assist in hospital. R knee ROM 0-90 in seated position. Feels well today.     Vital Signs Last 24 Hrs  T(C): 36.6 (04-30-24 @ 08:19), Max: 36.6 (04-30-24 @ 08:19)  T(F): 97.8 (04-30-24 @ 08:19), Max: 97.8 (04-30-24 @ 08:19)  HR: 70 (04-30-24 @ 08:19) (63 - 70)  BP: 110/58 (04-30-24 @ 08:19) (110/58 - 115/61)  BP(mean): --  RR: 18 (04-30-24 @ 08:19) (18 - 19)  SpO2: 98% (04-30-24 @ 08:19) (98% - 100%)  AVSS    General: Pt Alert and oriented, NAD  DSG- prineo/ aquacel C/D/I  Pulses: +2DP, WWP feet  Sensation: SILT BLE  Motor: 5/5 EHL/FHL/TA/GS BLE                          10.2   10.47 )-----------( 177      ( 30 Apr 2024 05:30 )             30.5     04-30    136  |  104  |  35<H>  ----------------------------<  132<H>  4.7   |  25  |  1.16    Ca    9.1      30 Apr 2024 05:30        A/P: 79yMale POD#1 s/p right total knee replacement  - VSS, Labs WNL  - Pain Control  - DVT ppx: home dose xarelto 10mg PO daily, asa 81mg daily  - PT, WBS: WBAT, OT consult  - OOB for meals, I/S  - bowel regimen  - h/o PJI- 3x doses Ancef, then continue cefpodoxime 200mg PO bid while in-house and plan for dc on cefadroxil 500mg for 10 days  - dispo: home with HPT pending PT clearance    Ortho Pager [phone]

## 2024-04-30 NOTE — DISCHARGE NOTE NURSING/CASE MANAGEMENT/SOCIAL WORK - NSDCPEXARELTOCOMP_GEN_ALL_CORE
Rivaroxaban/Xarelto is used to treat and prevent blood clots.  If you are not able to swallow the tablets whole, you may crush the tablets and mix them with a small amount of applesauce and promptly take within four hours. Eat some food right after you swallow the mixture. Take 2.5mg or 10mg tablets of Rivaroxaban/Xarelto with or without food. Take 15mg or 20mg tablets of Rivaroxaban/Xarelto with food. Never skip a dose of Rivaroxaban/Xarelto. If you take Rivaroxaban/Xarelto once a day and you forget to take your dose, take a dose as soon as you remember on the same day. If you take Rivaroxaban/Xarelto 2.5 mg twice a day and you forget to take your dose, skip the missed dose and take your next dose at your usual time. DO NOT take an extra pill to ‘catch up’. If you take Rivaroxaban/Xarelto 15 mg twice a day and you forget to take your dose, take a dose as soon as you remember on the same day. You may take 2 doses at the same time to make up for missed dose ONLY if you take a total of 30mg per day. Notify your doctor that you missed a dose. Take Rivaroxaban/Xarelto at the same time each morning and evening. Rivaroxaban/Xarelto may be taken with other medication or food. Rivaroxaban/Xarelto is used to treat and prevent blood clots.  If you are not able to swallow the tablets whole, you may crush the tablets and mix them with a small amount of applesauce and promptly take within four hours. Eat some food right after you swallow the mixture. Take 2.5mg or 10mg tablets of Rivaroxaban/Xarelto with or without food. Take 15mg or 20mg tablets of Rivaroxaban/Xarelto with food. Never skip a dose of Rivaroxaban/Xarelto.  If you take Rivaroxaban/Xarelto once a day and you forget to take your dose, take a dose as soon as you remember on the same day. If you take Rivaroxaban/Xarelto 2.5 mg twice a day and you forget to take your dose, skip the missed dose and take your next dose at your usual time. DO NOT take an extra pill to ‘catch up’. If you take Rivaroxaban/Xarelto 15 mg twice a day and you forget to take your dose, take a dose as soon as you remember on the same day. You may take 2 doses at the same time to make up for missed dose ONLY if you take a total of 30mg per day. Notify your doctor that you missed a dose. Take Rivaroxaban/Xarelto at the same time each morning and evening. Rivaroxaban/Xarelto may be taken with other medication or food. today

## 2024-04-30 NOTE — DISCHARGE NOTE PROVIDER - NSDCMRMEDTOKEN_GEN_ALL_CORE_FT
aspirin 81 mg oral tablet, chewable: 1 tab(s) orally once a day  atorvastatin 40 mg oral tablet: 1 tab(s) orally once a day (at bedtime)  azaTHIOprine 50 mg oral tablet: 1 tab(s) orally once a day  celecoxib 200 mg oral capsule: 1 cap(s) orally once a day  Citrucel 500 mg oral tablet: 4 tab(s) orally once a day  Cosamin  mg-400 mg oral tablet: 1 tab(s) orally once a day  cycloSPORINE 25 mg oral capsule: 1 cap(s) orally once a day  ezetimibe 10 mg oral tablet: 1 tab(s) orally once a day  folic acid 1 mg oral tablet: 4 tab(s) orally once a day  gabapentin 300 mg oral tablet: 3 orally once a day  latanoprost 0.005% ophthalmic solution: 1 drop(s) to each affected eye once a day (in the evening)  MiraLax oral powder for reconstitution: 17 gram(s) orally once a day  oxyBUTYnin 10 mg/24 hr oral tablet, extended release: 1 tab(s) orally once a day  Proscar 5 mg oral tablet: tab(s) orally once a day  ramipril 5 mg oral capsule: 1 cap(s) orally once a day  Sulfatrim 200 mg-40 mg/5 mL oral suspension: 20 milliliter(s) orally 3 times a week  Vitamin C 500 mg oral tablet: 1 tab(s) orally once a day  Vitamin D3 50 mcg (2000 intl units) oral tablet: 1 tab(s) orally once a day  Xarelto 10 mg oral tablet: 1 tab(s) orally once a day   acetaminophen 325 mg oral tablet: 2 tab(s) orally every 6 hours  aspirin 81 mg oral tablet, chewable: 1 tab(s) orally once a day  atorvastatin 40 mg oral tablet: 1 tab(s) orally once a day (at bedtime)  azaTHIOprine 50 mg oral tablet: 1 tab(s) orally once a day  cefadroxil 500 mg oral capsule: 1 cap(s) orally 2 times a day  celecoxib 200 mg oral capsule: 1 cap(s) orally every 12 hours  Citrucel 500 mg oral tablet: 4 tab(s) orally once a day  Cosamin  mg-400 mg oral tablet: 1 tab(s) orally once a day  cycloSPORINE 25 mg oral capsule: 1 cap(s) orally once a day  ezetimibe 10 mg oral tablet: 1 tab(s) orally once a day  folic acid 1 mg oral tablet: 4 tab(s) orally once a day  gabapentin 300 mg oral tablet: 3 orally once a day  latanoprost 0.005% ophthalmic solution: 1 drop(s) to each affected eye once a day (in the evening)  oxyBUTYnin 10 mg/24 hr oral tablet, extended release: 1 tab(s) orally once a day  pantoprazole 40 mg oral delayed release tablet: 1 tab(s) orally once a day  Proscar 5 mg oral tablet: tab(s) orally once a day  ramipril 5 mg oral capsule: 1 cap(s) orally once a day  rivaroxaban 10 mg oral tablet: 1 tab(s) orally once a day  traMADol 50 mg oral tablet: 1 tab(s) orally every 4 to 6 hours as needed for  severe pain  Vitamin C 500 mg oral tablet: 1 tab(s) orally once a day  Vitamin D3 50 mcg (2000 intl units) oral tablet: 1 tab(s) orally once a day   acetaminophen 325 mg oral tablet: 2 tab(s) orally every 6 hours  aspirin 81 mg oral tablet, chewable: 1 tab(s) orally once a day  atorvastatin 40 mg oral tablet: 1 tab(s) orally once a day (at bedtime)  azaTHIOprine 50 mg oral tablet: 1 tab(s) orally once a day  cefadroxil 500 mg oral capsule: 1 cap(s) orally 2 times a day  celecoxib 200 mg oral capsule: 1 cap(s) orally every 12 hours  Citrucel 500 mg oral tablet: 4 tab(s) orally once a day  Cosamin  mg-400 mg oral tablet: 1 tab(s) orally once a day  cycloSPORINE 25 mg oral capsule: 1 cap(s) orally once a day  ezetimibe 10 mg oral tablet: 1 tab(s) orally once a day  folic acid 1 mg oral tablet: 4 tab(s) orally once a day  gabapentin 300 mg oral tablet: 3 orally once a day  latanoprost 0.005% ophthalmic solution: 1 drop(s) to each affected eye once a day (in the evening)  oxyBUTYnin 10 mg/24 hr oral tablet, extended release: 1 tab(s) orally once a day  pantoprazole 40 mg oral delayed release tablet: 1 tab(s) orally once a day  Proscar 5 mg oral tablet: tab(s) orally once a day  ramipril 5 mg oral capsule: 1 cap(s) orally once a day  rivaroxaban 10 mg oral tablet: 1 tab(s) orally once a day  sulfamethoxazole-trimethoprim 200 mg-40 mg/5 mL oral suspension: 20 milliliter(s) orally once a day  traMADol 50 mg oral tablet: 1 tab(s) orally every 4 to 6 hours as needed for  severe pain  Vitamin C 500 mg oral tablet: 1 tab(s) orally once a day  Vitamin D3 50 mcg (2000 intl units) oral tablet: 1 tab(s) orally once a day   acetaminophen 325 mg oral tablet: 2 tab(s) orally every 6 hours  aspirin 81 mg oral tablet, chewable: 1 tab(s) orally once a day  atorvastatin 40 mg oral tablet: 1 tab(s) orally once a day (at bedtime)  azaTHIOprine 50 mg oral tablet: 1 tab(s) orally once a day  cefadroxil 500 mg oral capsule: 1 cap(s) orally 2 times a day  celecoxib 200 mg oral capsule: 1 cap(s) orally every 12 hours (can resume in 1 week, 5/9)  Citrucel 500 mg oral tablet: 4 tab(s) orally once a day  Cosamin  mg-400 mg oral tablet: 1 tab(s) orally once a day  cycloSPORINE 25 mg oral capsule: 1 cap(s) orally once a day  ezetimibe 10 mg oral tablet: 1 tab(s) orally once a day  folic acid 1 mg oral tablet: 4 tab(s) orally once a day  gabapentin 300 mg oral tablet: 3 orally once a day  latanoprost 0.005% ophthalmic solution: 1 drop(s) to each affected eye once a day (in the evening)  oxyBUTYnin 10 mg/24 hr oral tablet, extended release: 1 tab(s) orally once a day  pantoprazole 40 mg oral delayed release tablet: 1 tab(s) orally once a day  Proscar 5 mg oral tablet: tab(s) orally once a day  ramipril 5 mg oral capsule: 1 cap(s) orally once a day (can resume in 1 week, 5/9)  rivaroxaban 10 mg oral tablet: 1 tab(s) orally once a day  sulfamethoxazole-trimethoprim 200 mg-40 mg/5 mL oral suspension: 20 milliliter(s) orally once a day  traMADol 50 mg oral tablet: 1 tab(s) orally every 4 to 6 hours as needed for  severe pain  Vitamin C 500 mg oral tablet: 1 tab(s) orally once a day  Vitamin D3 50 mcg (2000 intl units) oral tablet: 1 tab(s) orally once a day

## 2024-04-30 NOTE — DISCHARGE NOTE NURSING/CASE MANAGEMENT/SOCIAL WORK - NSDCVIVACCINE_GEN_ALL_CORE_FT
JAYIME Henning for COLONOSCOPY on 8/23/23 arrive at 1230 pm  .pt already has prep instructions....miralax      DTaP; 22-Jun-2015 07:25; Nain Everett (ELIJAH); [employer]; [id number]; IntraMuscular; Deltoid Right.; 0.5 milliLiter(s); VIS (VIS Published: 09-May-2013, VIS Presented: 22-Jun-2015);

## 2024-04-30 NOTE — DISCHARGE NOTE PROVIDER - CARE PROVIDER_API CALL
Gianni Hodges  Joint Reconstruction  [address]  [place] [address]  Phone: [phone]  Fax: [phone]  Follow Up Time:

## 2024-04-30 NOTE — DISCHARGE NOTE PROVIDER - NSDCCPTREATMENT_GEN_ALL_CORE_FT
PRINCIPAL PROCEDURE  Procedure: Total knee replacement  Findings and Treatment:      PRINCIPAL PROCEDURE  Procedure: Total knee replacement  Findings and Treatment: osteoarthritis

## 2024-04-30 NOTE — OCCUPATIONAL THERAPY INITIAL EVALUATION ADULT - RANGE OF MOTION EXAMINATION, UPPER EXTREMITY
H/O impaired R shoulder (w/o repair); elbow, wrist and digits at WFL, WNL; LUE AROM, PROM at WFL, WNL

## 2024-05-01 LAB
ANION GAP SERPL CALC-SCNC: 7 MMOL/L — SIGNIFICANT CHANGE UP (ref 5–17)
ANION GAP SERPL CALC-SCNC: 7 MMOL/L — SIGNIFICANT CHANGE UP (ref 5–17)
APPEARANCE UR: CLEAR — SIGNIFICANT CHANGE UP
BACTERIA # UR AUTO: NEGATIVE /HPF — SIGNIFICANT CHANGE UP
BILIRUB UR-MCNC: NEGATIVE — SIGNIFICANT CHANGE UP
BUN SERPL-MCNC: 43 MG/DL — HIGH (ref 7–23)
BUN SERPL-MCNC: 47 MG/DL — HIGH (ref 7–23)
CALCIUM SERPL-MCNC: 8.7 MG/DL — SIGNIFICANT CHANGE UP (ref 8.4–10.5)
CALCIUM SERPL-MCNC: 9 MG/DL — SIGNIFICANT CHANGE UP (ref 8.4–10.5)
CHLORIDE SERPL-SCNC: 101 MMOL/L — SIGNIFICANT CHANGE UP (ref 96–108)
CHLORIDE SERPL-SCNC: 104 MMOL/L — SIGNIFICANT CHANGE UP (ref 96–108)
CO2 SERPL-SCNC: 24 MMOL/L — SIGNIFICANT CHANGE UP (ref 22–31)
CO2 SERPL-SCNC: 25 MMOL/L — SIGNIFICANT CHANGE UP (ref 22–31)
COLOR SPEC: YELLOW — SIGNIFICANT CHANGE UP
CREAT SERPL-MCNC: 1.33 MG/DL — HIGH (ref 0.5–1.3)
CREAT SERPL-MCNC: 1.42 MG/DL — HIGH (ref 0.5–1.3)
DIFF PNL FLD: ABNORMAL
EGFR: 50 ML/MIN/1.73M2 — LOW
EGFR: 54 ML/MIN/1.73M2 — LOW
GLUCOSE SERPL-MCNC: 87 MG/DL — SIGNIFICANT CHANGE UP (ref 70–99)
GLUCOSE SERPL-MCNC: 98 MG/DL — SIGNIFICANT CHANGE UP (ref 70–99)
GLUCOSE UR QL: NEGATIVE MG/DL — SIGNIFICANT CHANGE UP
HCT VFR BLD CALC: 27.3 % — LOW (ref 39–50)
HGB BLD-MCNC: 8.8 G/DL — LOW (ref 13–17)
KETONES UR-MCNC: NEGATIVE MG/DL — SIGNIFICANT CHANGE UP
LEUKOCYTE ESTERASE UR-ACNC: NEGATIVE — SIGNIFICANT CHANGE UP
MCHC RBC-ENTMCNC: 32.2 GM/DL — SIGNIFICANT CHANGE UP (ref 32–36)
MCHC RBC-ENTMCNC: 33 PG — SIGNIFICANT CHANGE UP (ref 27–34)
MCV RBC AUTO: 102.2 FL — HIGH (ref 80–100)
NITRITE UR-MCNC: NEGATIVE — SIGNIFICANT CHANGE UP
NRBC # BLD: 0 /100 WBCS — SIGNIFICANT CHANGE UP (ref 0–0)
PH UR: 6 — SIGNIFICANT CHANGE UP (ref 5–8)
PLATELET # BLD AUTO: 149 K/UL — LOW (ref 150–400)
POTASSIUM SERPL-MCNC: 4.3 MMOL/L — SIGNIFICANT CHANGE UP (ref 3.5–5.3)
POTASSIUM SERPL-MCNC: 5 MMOL/L — SIGNIFICANT CHANGE UP (ref 3.5–5.3)
POTASSIUM SERPL-SCNC: 4.3 MMOL/L — SIGNIFICANT CHANGE UP (ref 3.5–5.3)
POTASSIUM SERPL-SCNC: 5 MMOL/L — SIGNIFICANT CHANGE UP (ref 3.5–5.3)
PROT UR-MCNC: NEGATIVE MG/DL — SIGNIFICANT CHANGE UP
RBC # BLD: 2.67 M/UL — LOW (ref 4.2–5.8)
RBC # FLD: 13 % — SIGNIFICANT CHANGE UP (ref 10.3–14.5)
RBC CASTS # UR COMP ASSIST: 13 /HPF — HIGH (ref 0–4)
SODIUM SERPL-SCNC: 133 MMOL/L — LOW (ref 135–145)
SODIUM SERPL-SCNC: 135 MMOL/L — SIGNIFICANT CHANGE UP (ref 135–145)
SP GR SPEC: 1.01 — SIGNIFICANT CHANGE UP (ref 1–1.03)
SQUAMOUS # UR AUTO: 1 /HPF — SIGNIFICANT CHANGE UP (ref 0–5)
UROBILINOGEN FLD QL: 0.2 MG/DL — SIGNIFICANT CHANGE UP (ref 0.2–1)
WBC # BLD: 8.27 K/UL — SIGNIFICANT CHANGE UP (ref 3.8–10.5)
WBC # FLD AUTO: 8.27 K/UL — SIGNIFICANT CHANGE UP (ref 3.8–10.5)
WBC UR QL: 2 /HPF — SIGNIFICANT CHANGE UP (ref 0–5)

## 2024-05-01 PROCEDURE — 99233 SBSQ HOSP IP/OBS HIGH 50: CPT

## 2024-05-01 RX ORDER — SODIUM CHLORIDE 9 MG/ML
500 INJECTION INTRAMUSCULAR; INTRAVENOUS; SUBCUTANEOUS ONCE
Refills: 0 | Status: COMPLETED | OUTPATIENT
Start: 2024-05-01 | End: 2024-05-01

## 2024-05-01 RX ORDER — ROSUVASTATIN CALCIUM 5 MG/1
5 TABLET ORAL AT BEDTIME
Refills: 0 | Status: DISCONTINUED | OUTPATIENT
Start: 2024-05-01 | End: 2024-05-02

## 2024-05-01 RX ORDER — CYCLOSPORINE 100 MG/1
25 CAPSULE ORAL DAILY
Refills: 0 | Status: DISCONTINUED | OUTPATIENT
Start: 2024-05-01 | End: 2024-05-02

## 2024-05-01 RX ADMIN — Medication 975 MILLIGRAM(S): at 21:32

## 2024-05-01 RX ADMIN — Medication 200 MILLIGRAM(S): at 05:17

## 2024-05-01 RX ADMIN — Medication 81 MILLIGRAM(S): at 11:35

## 2024-05-01 RX ADMIN — SENNA PLUS 2 TABLET(S): 8.6 TABLET ORAL at 21:31

## 2024-05-01 RX ADMIN — Medication 10 MILLIGRAM(S): at 11:36

## 2024-05-01 RX ADMIN — RIVAROXABAN 10 MILLIGRAM(S): KIT at 11:37

## 2024-05-01 RX ADMIN — LATANOPROST 1 DROP(S): 0.05 SOLUTION/ DROPS OPHTHALMIC; TOPICAL at 22:06

## 2024-05-01 RX ADMIN — Medication 200 MILLIGRAM(S): at 17:29

## 2024-05-01 RX ADMIN — Medication 975 MILLIGRAM(S): at 05:17

## 2024-05-01 RX ADMIN — CYCLOSPORINE 25 MILLIGRAM(S): 100 CAPSULE ORAL at 14:15

## 2024-05-01 RX ADMIN — FINASTERIDE 5 MILLIGRAM(S): 5 TABLET, FILM COATED ORAL at 11:35

## 2024-05-01 RX ADMIN — AZATHIOPRINE 50 MILLIGRAM(S): 100 TABLET ORAL at 11:36

## 2024-05-01 RX ADMIN — Medication 975 MILLIGRAM(S): at 13:46

## 2024-05-01 RX ADMIN — TRAMADOL HYDROCHLORIDE 50 MILLIGRAM(S): 50 TABLET ORAL at 09:31

## 2024-05-01 RX ADMIN — TRAMADOL HYDROCHLORIDE 50 MILLIGRAM(S): 50 TABLET ORAL at 22:30

## 2024-05-01 RX ADMIN — TRAMADOL HYDROCHLORIDE 50 MILLIGRAM(S): 50 TABLET ORAL at 21:31

## 2024-05-01 RX ADMIN — TRAMADOL HYDROCHLORIDE 50 MILLIGRAM(S): 50 TABLET ORAL at 10:31

## 2024-05-01 RX ADMIN — GABAPENTIN 900 MILLIGRAM(S): 400 CAPSULE ORAL at 21:31

## 2024-05-01 RX ADMIN — ROSUVASTATIN CALCIUM 5 MILLIGRAM(S): 5 TABLET ORAL at 21:31

## 2024-05-01 RX ADMIN — Medication 20 MILLILITER(S): at 05:41

## 2024-05-01 RX ADMIN — CELECOXIB 200 MILLIGRAM(S): 200 CAPSULE ORAL at 05:16

## 2024-05-01 RX ADMIN — SODIUM CHLORIDE 500 MILLILITER(S): 9 INJECTION INTRAMUSCULAR; INTRAVENOUS; SUBCUTANEOUS at 09:31

## 2024-05-01 RX ADMIN — POLYETHYLENE GLYCOL 3350 17 GRAM(S): 17 POWDER, FOR SOLUTION ORAL at 21:32

## 2024-05-01 RX ADMIN — Medication 975 MILLIGRAM(S): at 22:30

## 2024-05-01 NOTE — PROGRESS NOTE ADULT - SUBJECTIVE AND OBJECTIVE BOX
Ortho Note    Pt comfortable without complaints, pain controlled  Denies CP, SOB, N/V, numbness/tingling     Vital Signs Last 24 Hrs  T(C): 36.7 (05-01-24 @ 09:30), Max: 36.7 (05-01-24 @ 09:30)  T(F): 98.1 (05-01-24 @ 09:30), Max: 98.1 (05-01-24 @ 09:30)  HR: 65 (05-01-24 @ 09:30) (54 - 65)  BP: 105/62 (05-01-24 @ 09:30) (100/57 - 105/62)  BP(mean): --  RR: 18 (05-01-24 @ 09:30) (17 - 18)  SpO2: 96% (05-01-24 @ 09:30) (96% - 96%)  AVSS    General: Pt Alert and oriented, NAD  DSG- prineo/ aquacel C/D/I  Pulses: +2DP, WWP feet  Sensation: SILT BLE  Motor: 5/5 EHL/FHL/TA/GS BLE                          8.8    8.27  )-----------( 149      ( 01 May 2024 05:30 )             27.3     05-01    135  |  104  |  43<H>  ----------------------------<  98  4.3   |  24  |  1.33<H>    Ca    8.7      01 May 2024 05:30        A/P: 79yMale POD#2 s/p right total knee replacement  - orthostatic hypotension yesterday- resolving, has been OOB this AM, continue to monitor orthostatics, PO hydration promoted  - MAGGY- likely pre-renal, volume deficit; 500cc bolus given this AM, NSAIDs discontinued, f/u repeat BMP in PM  - Pain Control  - DVT ppx: home dose xarelto 10mg PO daily, asa 81mg daily  - PT, WBS: WBAT, OT consult  - OOB for meals, I/S  - bowel regimen  - h/o PJI- 3x doses Ancef, then continue cefpodoxime 200mg PO bid while in-house and plan for dc on cefadroxil 500mg for 10 days  - dispo: home with HPT pending PT clearance    Ortho Pager [phone] Ortho Note    Pt comfortable without complaints, pain controlled  Denies CP, SOB, N/V, numbness/tingling     Vital Signs Last 24 Hrs  T(C): 36.7 (05-01-24 @ 09:30), Max: 36.7 (05-01-24 @ 09:30)  T(F): 98.1 (05-01-24 @ 09:30), Max: 98.1 (05-01-24 @ 09:30)  HR: 65 (05-01-24 @ 09:30) (54 - 65)  BP: 105/62 (05-01-24 @ 09:30) (100/57 - 105/62)  BP(mean): --  RR: 18 (05-01-24 @ 09:30) (17 - 18)  SpO2: 96% (05-01-24 @ 09:30) (96% - 96%)  AVSS    General: Pt Alert and oriented, NAD  DSG- prineo/ aquacel C/D/I  Pulses: +2DP, WWP feet  Sensation: SILT BLE  Motor: 5/5 EHL/FHL/TA/GS BLE                          8.8    8.27  )-----------( 149      ( 01 May 2024 05:30 )             27.3     05-01    135  |  104  |  43<H>  ----------------------------<  98  4.3   |  24  |  1.33<H>    Ca    8.7      01 May 2024 05:30        A/P: 79yMale POD#2 s/p right total knee replacement  - orthostatic hypotension yesterday- resolving, has been OOB this AM, continue to monitor orthostatics, PO hydration promoted  - MAGGY- likely pre-renal, volume deficit; 500cc bolus given this AM, NSAIDs discontinued, f/u repeat BMP in PM  - Pain Control  - DVT ppx: home dose xarelto 10mg PO daily, asa 81mg daily  - PT, WBS: WBAT, OT consult  - OOB for meals, I/S  - bowel regimen- +BM today, 5/1  - h/o PJI- 3x doses Ancef, then continue cefpodoxime 200mg PO bid while in-house and plan for dc on cefadroxil 500mg for 10 days  - dispo: home with HPT pending PT clearance    Ortho Pager [phone]

## 2024-05-01 NOTE — PROGRESS NOTE ADULT - SUBJECTIVE AND OBJECTIVE BOX
Ortho Progress Note     Procedure: R tka  Surgeon: Oh    Pt comfortable without complaints, pain controlled. R knee pain well controlled  Denies CP, SOB, N/V, numbness/tingling     Vital Signs Last 24 Hrs  T(C): 36.5 (01 May 2024 05:14), Max: 36.7 (30 Apr 2024 20:34)  T(F): 97.7 (01 May 2024 05:14), Max: 98 (30 Apr 2024 20:34)  HR: 55 (01 May 2024 05:48) (53 - 76)  BP: 100/57 (01 May 2024 05:14) (70/50 - 122/73)  BP(mean): --  RR: 18 (01 May 2024 05:48) (17 - 18)  SpO2: 96% (01 May 2024 05:48) (95% - 98%)    Parameters below as of 01 May 2024 05:48  Patient On (Oxygen Delivery Method): room air            General: Pt Alert and oriented, NAD  DSG C/D/I prevena, no skin blistering appreciatd overlying prevena   SILT sural/saph/dpn/spn/tib   5/5 EHL/FHL/TA/GS b/l           A/P: 79yMale  s/p R TKA by Dr Hodges on 4/30  - Stable  - Pain Control  - DVT ppx: xarelto pod 1  - Post op abx: ancef  - PT, WBS:  wbat   - Dispo: HPT pending clearance , orthostatic w/ PT yday, now s/p 1 L bolus and hold parameters placed on lisinopril     Ortho Pager [phone]

## 2024-05-01 NOTE — PROGRESS NOTE ADULT - SUBJECTIVE AND OBJECTIVE BOX
INTERVAL HPI/OVERNIGHT EVENTS: ana m o/n    SUBJECTIVE: Patient seen and examined at bedside.   Pt had BM. States he is voiding. States pain is controlled, no LH/dizziness, chest pain, dyspnea. No fevers. Eating wo N/V/abd pain.  Bladder scan 450. Afternoon was retaining 900cc + and needed straight catheterization    OBJECTIVE:    VITAL SIGNS:  ICU Vital Signs Last 24 Hrs  T(C): 36.7 (01 May 2024 09:30), Max: 36.7 (30 Apr 2024 20:34)  T(F): 98.1 (01 May 2024 09:30), Max: 98.1 (01 May 2024 09:30)  HR: 94 (01 May 2024 12:44) (53 - 94)  BP: 122/80 (01 May 2024 12:44) (100/49 - 122/80)  BP(mean): --  ABP: --  ABP(mean): --  RR: 16 (01 May 2024 12:44) (16 - 18)  SpO2: 97% (01 May 2024 12:44) (95% - 97%)    O2 Parameters below as of 01 May 2024 12:44  Patient On (Oxygen Delivery Method): room air              04-30 @ 07:01 - 05-01 @ 07:00  --------------------------------------------------------  IN: 0 mL / OUT: 1100 mL / NET: -1100 mL    05-01 @ 07:01 - 05-01 @ 16:51  --------------------------------------------------------  IN: 1060 mL / OUT: 610 mL / NET: 450 mL      CAPILLARY BLOOD GLUCOSE          PHYSICAL EXAM:  GEN: Male in NAD on RA  HEENT: NC/AT, MMM  CV: RRR, nml S1S2, no murmurs  PULM: nml effort, CTAB  ABD: Soft, non-distended, NABS, non-tender  NEURO  A/O x3, moving all extremities, Sensation intact  PSYCH: Appropriate    MEDICATIONS:  MEDICATIONS  (STANDING):  acetaminophen     Tablet .. 975 milliGRAM(s) Oral every 8 hours  aspirin  chewable 81 milliGRAM(s) Oral daily  atorvastatin 20 milliGRAM(s) Oral at bedtime  azaTHIOprine 50 milliGRAM(s) Oral daily  cefpodoxime 200 milliGRAM(s) Oral every 12 hours  cycloSPORINE  , modified (NEORAL) 25 milliGRAM(s) Oral daily  finasteride 5 milliGRAM(s) Oral daily  gabapentin 900 milliGRAM(s) Oral at bedtime  lactated ringers. 1000 milliLiter(s) (100 mL/Hr) IV Continuous <Continuous>  latanoprost 0.005% Ophthalmic Solution 1 Drop(s) Both EYES at bedtime  polyethylene glycol 3350 17 Gram(s) Oral at bedtime  povidone iodine 5% Nasal Swab 1 Application(s) Both Nostrils once  rivaroxaban 10 milliGRAM(s) Oral daily  senna 2 Tablet(s) Oral at bedtime  trimethoprim  40 mG/sulfamethoxazole 200 mG Suspension 20 milliLiter(s) Oral <User Schedule>    MEDICATIONS  (PRN):  HYDROmorphone  Injectable 0.5 milliGRAM(s) IV Push every 15 minutes PRN breakthrough  magnesium hydroxide Suspension 30 milliLiter(s) Oral daily PRN Constipation  ondansetron Injectable 4 milliGRAM(s) IV Push every 6 hours PRN Nausea and/or Vomiting  traMADol 50 milliGRAM(s) Oral every 4 hours PRN Severe Pain (7 - 10)  traMADol 25 milliGRAM(s) Oral every 4 hours PRN Moderate Pain (4 - 6)      ALLERGIES:  Allergies    rifampin (Rash)  Levaquin (Rash)    Intolerances        LABS:                        8.8    8.27  )-----------( 149      ( 01 May 2024 05:30 )             27.3     05-01    133<L>  |  101  |  47<H>  ----------------------------<  87  5.0   |  25  |  1.42<H>    Ca    9.0      01 May 2024 12:00        Urinalysis Basic - ( 01 May 2024 12:00 )    Color: x / Appearance: x / SG: x / pH: x  Gluc: 87 mg/dL / Ketone: x  / Bili: x / Urobili: x   Blood: x / Protein: x / Nitrite: x   Leuk Esterase: x / RBC: x / WBC x   Sq Epi: x / Non Sq Epi: x / Bacteria: x        RADIOLOGY & ADDITIONAL TESTS: Reviewed.

## 2024-05-02 VITALS — OXYGEN SATURATION: 100 % | HEART RATE: 81 BPM | DIASTOLIC BLOOD PRESSURE: 68 MMHG | SYSTOLIC BLOOD PRESSURE: 127 MMHG

## 2024-05-02 LAB
ANION GAP SERPL CALC-SCNC: 8 MMOL/L — SIGNIFICANT CHANGE UP (ref 5–17)
BUN SERPL-MCNC: 34 MG/DL — HIGH (ref 7–23)
CALCIUM SERPL-MCNC: 9.1 MG/DL — SIGNIFICANT CHANGE UP (ref 8.4–10.5)
CHLORIDE SERPL-SCNC: 99 MMOL/L — SIGNIFICANT CHANGE UP (ref 96–108)
CO2 SERPL-SCNC: 24 MMOL/L — SIGNIFICANT CHANGE UP (ref 22–31)
CREAT SERPL-MCNC: 1.17 MG/DL — SIGNIFICANT CHANGE UP (ref 0.5–1.3)
EGFR: 63 ML/MIN/1.73M2 — SIGNIFICANT CHANGE UP
GLUCOSE SERPL-MCNC: 118 MG/DL — HIGH (ref 70–99)
HCT VFR BLD CALC: 28.8 % — LOW (ref 39–50)
HGB BLD-MCNC: 9.3 G/DL — LOW (ref 13–17)
MCHC RBC-ENTMCNC: 32.3 GM/DL — SIGNIFICANT CHANGE UP (ref 32–36)
MCHC RBC-ENTMCNC: 32.7 PG — SIGNIFICANT CHANGE UP (ref 27–34)
MCV RBC AUTO: 101.4 FL — HIGH (ref 80–100)
NRBC # BLD: 0 /100 WBCS — SIGNIFICANT CHANGE UP (ref 0–0)
PLATELET # BLD AUTO: 169 K/UL — SIGNIFICANT CHANGE UP (ref 150–400)
POTASSIUM SERPL-MCNC: 4.4 MMOL/L — SIGNIFICANT CHANGE UP (ref 3.5–5.3)
POTASSIUM SERPL-SCNC: 4.4 MMOL/L — SIGNIFICANT CHANGE UP (ref 3.5–5.3)
RBC # BLD: 2.84 M/UL — LOW (ref 4.2–5.8)
RBC # FLD: 12.9 % — SIGNIFICANT CHANGE UP (ref 10.3–14.5)
SODIUM SERPL-SCNC: 131 MMOL/L — LOW (ref 135–145)
WBC # BLD: 7.79 K/UL — SIGNIFICANT CHANGE UP (ref 3.8–10.5)
WBC # FLD AUTO: 7.79 K/UL — SIGNIFICANT CHANGE UP (ref 3.8–10.5)

## 2024-05-02 PROCEDURE — 80048 BASIC METABOLIC PNL TOTAL CA: CPT

## 2024-05-02 PROCEDURE — C1776: CPT

## 2024-05-02 PROCEDURE — 85730 THROMBOPLASTIN TIME PARTIAL: CPT

## 2024-05-02 PROCEDURE — 97530 THERAPEUTIC ACTIVITIES: CPT

## 2024-05-02 PROCEDURE — 36415 COLL VENOUS BLD VENIPUNCTURE: CPT

## 2024-05-02 PROCEDURE — 97110 THERAPEUTIC EXERCISES: CPT

## 2024-05-02 PROCEDURE — 85610 PROTHROMBIN TIME: CPT

## 2024-05-02 PROCEDURE — 94660 CPAP INITIATION&MGMT: CPT

## 2024-05-02 PROCEDURE — C1713: CPT

## 2024-05-02 PROCEDURE — 97162 PT EVAL MOD COMPLEX 30 MIN: CPT

## 2024-05-02 PROCEDURE — 81001 URINALYSIS AUTO W/SCOPE: CPT

## 2024-05-02 PROCEDURE — 85027 COMPLETE CBC AUTOMATED: CPT

## 2024-05-02 PROCEDURE — 99233 SBSQ HOSP IP/OBS HIGH 50: CPT

## 2024-05-02 PROCEDURE — C1889: CPT

## 2024-05-02 PROCEDURE — S2900: CPT

## 2024-05-02 PROCEDURE — 73560 X-RAY EXAM OF KNEE 1 OR 2: CPT

## 2024-05-02 PROCEDURE — 97165 OT EVAL LOW COMPLEX 30 MIN: CPT

## 2024-05-02 PROCEDURE — 97116 GAIT TRAINING THERAPY: CPT

## 2024-05-02 RX ORDER — RAMIPRIL 5 MG
1 CAPSULE ORAL
Qty: 0 | Refills: 0 | DISCHARGE

## 2024-05-02 RX ADMIN — TRAMADOL HYDROCHLORIDE 50 MILLIGRAM(S): 50 TABLET ORAL at 16:14

## 2024-05-02 RX ADMIN — Medication 975 MILLIGRAM(S): at 05:22

## 2024-05-02 RX ADMIN — TRAMADOL HYDROCHLORIDE 50 MILLIGRAM(S): 50 TABLET ORAL at 12:45

## 2024-05-02 RX ADMIN — RIVAROXABAN 10 MILLIGRAM(S): KIT at 11:18

## 2024-05-02 RX ADMIN — FINASTERIDE 5 MILLIGRAM(S): 5 TABLET, FILM COATED ORAL at 11:17

## 2024-05-02 RX ADMIN — Medication 200 MILLIGRAM(S): at 05:22

## 2024-05-02 RX ADMIN — AZATHIOPRINE 50 MILLIGRAM(S): 100 TABLET ORAL at 11:17

## 2024-05-02 RX ADMIN — TRAMADOL HYDROCHLORIDE 50 MILLIGRAM(S): 50 TABLET ORAL at 17:00

## 2024-05-02 RX ADMIN — Medication 81 MILLIGRAM(S): at 11:18

## 2024-05-02 RX ADMIN — TRAMADOL HYDROCHLORIDE 50 MILLIGRAM(S): 50 TABLET ORAL at 12:02

## 2024-05-02 NOTE — PROGRESS NOTE ADULT - SUBJECTIVE AND OBJECTIVE BOX
INTERVAL HPI/OVERNIGHT EVENTS: ana m o/n    SUBJECTIVE: Patient seen and examined at bedside.   Doing well - voiding on his own. No further LH/dizziness episodes. Pain is controlled. No numbness, chest pain, dyspnea. Eating wo N/V/Abd pain. Had 2 BM yesterday. Denies any fevers.    OBJECTIVE:    VITAL SIGNS:  ICU Vital Signs Last 24 Hrs  T(C): 36.2 (02 May 2024 09:11), Max: 36.7 (01 May 2024 20:30)  T(F): 97.2 (02 May 2024 09:11), Max: 98 (01 May 2024 20:30)  HR: 83 (02 May 2024 09:11) (58 - 93)  BP: 109/68 (02 May 2024 09:11) (109/57 - 122/80)  BP(mean): --  ABP: --  ABP(mean): --  RR: 16 (02 May 2024 09:11) (16 - 18)  SpO2: 100% (02 May 2024 09:11) (95% - 100%)    O2 Parameters below as of 02 May 2024 09:11  Patient On (Oxygen Delivery Method): room air              05-01 @ 07:01  -  05-02 @ 07:00  --------------------------------------------------------  IN: 1360 mL / OUT: 1510 mL / NET: -150 mL      CAPILLARY BLOOD GLUCOSE          PHYSICAL EXAM:  GEN: Male in NAD on RA  HEENT: NC/AT, MMM  CV: RRR, nml S1S2, no murmurs  PULM: nml effort, CTAB  ABD: Soft, non-distended, NABS, non-tender  NEURO  A/O x3, moving all extremities, Sensation intact  PSYCH: Appropriate    MEDICATIONS:  MEDICATIONS  (STANDING):  acetaminophen     Tablet .. 975 milliGRAM(s) Oral every 8 hours  aspirin  chewable 81 milliGRAM(s) Oral daily  azaTHIOprine 50 milliGRAM(s) Oral daily  cefpodoxime 200 milliGRAM(s) Oral every 12 hours  cycloSPORINE  , modified (NEORAL) 25 milliGRAM(s) Oral daily  finasteride 5 milliGRAM(s) Oral daily  gabapentin 900 milliGRAM(s) Oral at bedtime  lactated ringers. 1000 milliLiter(s) (100 mL/Hr) IV Continuous <Continuous>  latanoprost 0.005% Ophthalmic Solution 1 Drop(s) Both EYES at bedtime  polyethylene glycol 3350 17 Gram(s) Oral at bedtime  povidone iodine 5% Nasal Swab 1 Application(s) Both Nostrils once  rivaroxaban 10 milliGRAM(s) Oral daily  rosuvastatin 5 milliGRAM(s) Oral at bedtime  senna 2 Tablet(s) Oral at bedtime  trimethoprim  40 mG/sulfamethoxazole 200 mG Suspension 20 milliLiter(s) Oral <User Schedule>    MEDICATIONS  (PRN):  HYDROmorphone  Injectable 0.5 milliGRAM(s) IV Push every 15 minutes PRN breakthrough  magnesium hydroxide Suspension 30 milliLiter(s) Oral daily PRN Constipation  ondansetron Injectable 4 milliGRAM(s) IV Push every 6 hours PRN Nausea and/or Vomiting  traMADol 50 milliGRAM(s) Oral every 4 hours PRN Severe Pain (7 - 10)  traMADol 25 milliGRAM(s) Oral every 4 hours PRN Moderate Pain (4 - 6)      ALLERGIES:  Allergies    rifampin (Rash)  Levaquin (Rash)    Intolerances        LABS:                        9.3    7.79  )-----------( 169      ( 02 May 2024 05:30 )             28.8     05-02    131<L>  |  99  |  34<H>  ----------------------------<  118<H>  4.4   |  24  |  1.17    Ca    9.1      02 May 2024 05:30        Urinalysis Basic - ( 02 May 2024 05:30 )    Color: x / Appearance: x / SG: x / pH: x  Gluc: 118 mg/dL / Ketone: x  / Bili: x / Urobili: x   Blood: x / Protein: x / Nitrite: x   Leuk Esterase: x / RBC: x / WBC x   Sq Epi: x / Non Sq Epi: x / Bacteria: x        RADIOLOGY & ADDITIONAL TESTS: Reviewed.

## 2024-05-02 NOTE — PROGRESS NOTE ADULT - SUBJECTIVE AND OBJECTIVE BOX
Ortho Progress Note     Procedure: R tka  Surgeon: Oh    Pt comfortable without complaints, pain controlled. R knee pain well controlled  Denies CP, SOB, N/V, numbness/tingling     Vital Signs Last 24 Hrs  T(C): 36.5 (01 May 2024 05:14), Max: 36.7 (30 Apr 2024 20:34)  T(F): 97.7 (01 May 2024 05:14), Max: 98 (30 Apr 2024 20:34)  HR: 55 (01 May 2024 05:48) (53 - 76)  BP: 100/57 (01 May 2024 05:14) (70/50 - 122/73)  BP(mean): --  RR: 18 (01 May 2024 05:48) (17 - 18)  SpO2: 96% (01 May 2024 05:48) (95% - 98%)    Parameters below as of 01 May 2024 05:48  Patient On (Oxygen Delivery Method): room air            General: Pt Alert and oriented, NAD  DSG C/D/I prevena, no skin blistering appreciatd overlying prevena   SILT sural/saph/dpn/spn/tib   5/5 EHL/FHL/TA/GS b/l           A/P: 79yMale  s/p R TKA by Dr Hodges on 4/30  - Stable  - Pain Control  - DVT ppx: xarelto pod 1  - f/u AM labs today, rising Cr to Cr 1.42 on 5/1, nephrotoxic medications stopped, f/u medicine recs for further mgmt, f/u urine studies ord on 5/1   - Post op abx: ancef  - PT, WBS:  wbat   - Dispo: HPT pending clearance and resolution of jesus     Ortho Pager [phone]

## 2024-05-02 NOTE — PROGRESS NOTE ADULT - ASSESSMENT
79M w eczema, CAD s/p stents, HTN, h/o DVT in LLE, dysequilibrium, L TKA c/b PJI, here w chronic R knee pain, s/p elective R TKR w Dr. Hodges 4/29, for HPT, now w MAGGY    #MAGGY - Cr 1.33 from 1.16. DDx includes celebrex vs urinary retention    #Post-op state - pain controlled. PPx: Xarelto 10mg daily. On bowel regimen and incentive spirometer  #R Knee OA    - Tylenol 1g q8, celebrex 200 BID, gabapentin 300mg q8h   - PRN: Tramadol 2550 q4 for mod/severe pain    #Acute blood loss anemia - 8.8 from 10.2 from 13. Asymptomatic    #Eczema - home on azathioprine 50 daily, cyclosporin 25mg daily, TMP-SMX MWF - liquid for ppx  #CAD s/p stents - on asa 81 daily, zetia 10mg daily, atorva 20mg daily  #HTN - home on ramipril 5mg daily,   #H/O DVT in LEFT leg - on Xarelto 10mg for ppx  #H/O dysequilibrium  #BPH - on finasteride  #Obesity - BMI 30. Affects all aspects of care    Plan  500cc fluids this AM. Encourage PO/fluid intake  Bladder scan-- mobilize OOB and void standing  Hold celebrex. Stop oxybutynin as this can also cause urinary retention  Repeat BMP 1200h indicates rising Cr -- would obtain UA, urine Na, Cr.    DISPO: HPT   Above d/w Ortho
79M w eczema, CAD s/p stents, HTN, h/o DVT in LLE, dysequilibrium, L TKA c/b PJI, here w chronic R knee pain, s/p elective R TKR w Dr. Hodges 4/29, for HPT, now w MAGGY - likely d/t urinary retention - now improving    #MAGGY - Resolved. 1.17 from 1.42. Was 1.16. Likely d/t urinary retention   #Acute urinary retention - resolved    #Post-op state - pain controlled. PPx: Xarelto 10mg daily. On bowel regimen and incentive spirometer  #R Knee OA    - Tylenol 1g q8, celebrex 200 BID, gabapentin 300mg q8h   - PRN: Tramadol 2550 q4 for mod/severe pain    #Acute blood loss anemia - 9.3 from 8.8 from 10.2 from 13. Asymptomatic  #Hyponatremia - 131. Asymptomatic    #Eczema - home on azathioprine 50 daily, cyclosporin 25mg daily, TMP-SMX MWF - liquid for ppx  #CAD s/p stents - on asa 81 daily, zetia 10mg daily, atorva 20mg daily  #HTN - home on ramipril 5mg daily,    - -120 today.    #H/O DVT in LEFT leg - on Xarelto 10mg for ppx  #H/O dysequilibrium  #BPH - on finasteride  #Obesity - BMI 30. Affects all aspects of care    Plan  Overall improving   -- encourage PO/fluid intake   -- would hold celebrex x1wk - pt counseled to restart if pain in chronic back pain worsens   -- would hold home ramipril 5mg for 1wk - BPs have been normotensive.  Above discussed with patient and orthopedics    DISPO: HPT

## 2024-05-03 ENCOUNTER — TRANSCRIPTION ENCOUNTER (OUTPATIENT)
Age: 79
End: 2024-05-03

## 2024-05-04 ENCOUNTER — EMERGENCY (EMERGENCY)
Facility: HOSPITAL | Age: 79
LOS: 1 days | Discharge: HOME | End: 2024-05-04
Attending: EMERGENCY MEDICINE | Admitting: EMERGENCY MEDICINE
Payer: MEDICARE

## 2024-05-04 VITALS
HEIGHT: 71 IN | HEART RATE: 72 BPM | WEIGHT: 195.11 LBS | DIASTOLIC BLOOD PRESSURE: 61 MMHG | RESPIRATION RATE: 16 BRPM | OXYGEN SATURATION: 97 % | SYSTOLIC BLOOD PRESSURE: 116 MMHG | TEMPERATURE: 99 F

## 2024-05-04 VITALS
OXYGEN SATURATION: 98 % | RESPIRATION RATE: 19 BRPM | SYSTOLIC BLOOD PRESSURE: 106 MMHG | HEART RATE: 84 BPM | DIASTOLIC BLOOD PRESSURE: 87 MMHG

## 2024-05-04 DIAGNOSIS — Z98.890 OTHER SPECIFIED POSTPROCEDURAL STATES: Chronic | ICD-10-CM

## 2024-05-04 DIAGNOSIS — Z41.9 ENCOUNTER FOR PROCEDURE FOR PURPOSES OTHER THAN REMEDYING HEALTH STATE, UNSPECIFIED: Chronic | ICD-10-CM

## 2024-05-04 DIAGNOSIS — S90.31XA CONTUSION OF RIGHT FOOT, INITIAL ENCOUNTER: ICD-10-CM
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
## 2024-05-04 DIAGNOSIS — I25.10 ATHEROSCLEROTIC HEART DISEASE OF NATIVE CORONARY ARTERY WITHOUT ANGINA PECTORIS: ICD-10-CM

## 2024-05-04 DIAGNOSIS — Z88.8 ALLERGY STATUS TO OTHER DRUGS, MEDICAMENTS AND BIOLOGICAL SUBSTANCES: ICD-10-CM

## 2024-05-04 DIAGNOSIS — Z95.5 PRESENCE OF CORONARY ANGIOPLASTY IMPLANT AND GRAFT: Chronic | ICD-10-CM
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
## 2024-05-04 DIAGNOSIS — Z98.89 OTHER SPECIFIED POSTPROCEDURAL STATES: Chronic | ICD-10-CM

## 2024-05-04 DIAGNOSIS — R60.0 LOCALIZED EDEMA: ICD-10-CM

## 2024-05-04 DIAGNOSIS — Z96.652 PRESENCE OF LEFT ARTIFICIAL KNEE JOINT: Chronic | ICD-10-CM

## 2024-05-04 DIAGNOSIS — M79.89 OTHER SPECIFIED SOFT TISSUE DISORDERS: ICD-10-CM

## 2024-05-04 DIAGNOSIS — N63.0 UNSPECIFIED LUMP IN UNSPECIFIED BREAST: Chronic | ICD-10-CM

## 2024-05-04 DIAGNOSIS — Z96.651 PRESENCE OF RIGHT ARTIFICIAL KNEE JOINT: ICD-10-CM

## 2024-05-04 DIAGNOSIS — Z98.52 VASECTOMY STATUS: Chronic | ICD-10-CM

## 2024-05-04 DIAGNOSIS — Z79.01 LONG TERM (CURRENT) USE OF ANTICOAGULANTS: ICD-10-CM

## 2024-05-04 DIAGNOSIS — Z86.718 PERSONAL HISTORY OF OTHER VENOUS THROMBOSIS AND EMBOLISM: ICD-10-CM

## 2024-05-04 DIAGNOSIS — Z95.5 PRESENCE OF CORONARY ANGIOPLASTY IMPLANT AND GRAFT: ICD-10-CM

## 2024-05-04 DIAGNOSIS — I10 ESSENTIAL (PRIMARY) HYPERTENSION: ICD-10-CM

## 2024-05-04 DIAGNOSIS — Z96.651 PRESENCE OF RIGHT ARTIFICIAL KNEE JOINT: Chronic | ICD-10-CM

## 2024-05-04 DIAGNOSIS — Z88.1 ALLERGY STATUS TO OTHER ANTIBIOTIC AGENTS STATUS: ICD-10-CM

## 2024-05-04 LAB
ANION GAP SERPL CALC-SCNC: 9 MMOL/L — SIGNIFICANT CHANGE UP (ref 5–17)
ANISOCYTOSIS BLD QL: SLIGHT — SIGNIFICANT CHANGE UP
APTT BLD: 35.8 SEC — HIGH (ref 24.5–35.6)
BASOPHILS # BLD AUTO: 0 K/UL — SIGNIFICANT CHANGE UP (ref 0–0.2)
BASOPHILS NFR BLD AUTO: 0 % — SIGNIFICANT CHANGE UP (ref 0–2)
BUN SERPL-MCNC: 29 MG/DL — HIGH (ref 7–23)
BURR CELLS BLD QL SMEAR: PRESENT — SIGNIFICANT CHANGE UP
CALCIUM SERPL-MCNC: 9.2 MG/DL — SIGNIFICANT CHANGE UP (ref 8.4–10.5)
CHLORIDE SERPL-SCNC: 99 MMOL/L — SIGNIFICANT CHANGE UP (ref 96–108)
CO2 SERPL-SCNC: 25 MMOL/L — SIGNIFICANT CHANGE UP (ref 22–31)
CREAT SERPL-MCNC: 1.05 MG/DL — SIGNIFICANT CHANGE UP (ref 0.5–1.3)
CRP SERPL-MCNC: 56.2 MG/L — HIGH (ref 0–4)
DACRYOCYTES BLD QL SMEAR: SLIGHT — SIGNIFICANT CHANGE UP
EGFR: 72 ML/MIN/1.73M2 — SIGNIFICANT CHANGE UP
EOSINOPHIL # BLD AUTO: 0.37 K/UL — SIGNIFICANT CHANGE UP (ref 0–0.5)
EOSINOPHIL NFR BLD AUTO: 5.3 % — SIGNIFICANT CHANGE UP (ref 0–6)
ERYTHROCYTE [SEDIMENTATION RATE] IN BLOOD: 40 MM/HR — HIGH
GLUCOSE SERPL-MCNC: 134 MG/DL — HIGH (ref 70–99)
HCT VFR BLD CALC: 27.9 % — LOW (ref 39–50)
HGB BLD-MCNC: 9.1 G/DL — LOW (ref 13–17)
HYPOCHROMIA BLD QL: SLIGHT — SIGNIFICANT CHANGE UP
INR BLD: 1.13 — SIGNIFICANT CHANGE UP (ref 0.85–1.18)
LACTATE SERPL-SCNC: 0.9 MMOL/L — SIGNIFICANT CHANGE UP (ref 0.5–2)
LYMPHOCYTES # BLD AUTO: 0.24 K/UL — LOW (ref 1–3.3)
LYMPHOCYTES # BLD AUTO: 3.5 % — LOW (ref 13–44)
MACROCYTES BLD QL: SLIGHT — SIGNIFICANT CHANGE UP
MANUAL SMEAR VERIFICATION: SIGNIFICANT CHANGE UP
MCHC RBC-ENTMCNC: 32.6 GM/DL — SIGNIFICANT CHANGE UP (ref 32–36)
MCHC RBC-ENTMCNC: 33.3 PG — SIGNIFICANT CHANGE UP (ref 27–34)
MCV RBC AUTO: 102.2 FL — HIGH (ref 80–100)
MICROCYTES BLD QL: SLIGHT — SIGNIFICANT CHANGE UP
MONOCYTES # BLD AUTO: 0.3 K/UL — SIGNIFICANT CHANGE UP (ref 0–0.9)
MONOCYTES NFR BLD AUTO: 4.4 % — SIGNIFICANT CHANGE UP (ref 2–14)
NEUTROPHILS # BLD AUTO: 6 K/UL — SIGNIFICANT CHANGE UP (ref 1.8–7.4)
NEUTROPHILS NFR BLD AUTO: 86.8 % — HIGH (ref 43–77)
OVALOCYTES BLD QL SMEAR: SLIGHT — SIGNIFICANT CHANGE UP
PLAT MORPH BLD: ABNORMAL
PLATELET # BLD AUTO: 200 K/UL — SIGNIFICANT CHANGE UP (ref 150–400)
POIKILOCYTOSIS BLD QL AUTO: SLIGHT — SIGNIFICANT CHANGE UP
POLYCHROMASIA BLD QL SMEAR: SLIGHT — SIGNIFICANT CHANGE UP
POTASSIUM SERPL-MCNC: 4.7 MMOL/L — SIGNIFICANT CHANGE UP (ref 3.5–5.3)
POTASSIUM SERPL-SCNC: 4.7 MMOL/L — SIGNIFICANT CHANGE UP (ref 3.5–5.3)
PROTHROM AB SERPL-ACNC: 12.8 SEC — SIGNIFICANT CHANGE UP (ref 9.5–13)
RBC # BLD: 2.73 M/UL — LOW (ref 4.2–5.8)
RBC # FLD: 13.1 % — SIGNIFICANT CHANGE UP (ref 10.3–14.5)
RBC BLD AUTO: ABNORMAL
SODIUM SERPL-SCNC: 133 MMOL/L — LOW (ref 135–145)
SPHEROCYTES BLD QL SMEAR: SLIGHT — SIGNIFICANT CHANGE UP
WBC # BLD: 6.91 K/UL — SIGNIFICANT CHANGE UP (ref 3.8–10.5)
WBC # FLD AUTO: 6.91 K/UL — SIGNIFICANT CHANGE UP (ref 3.8–10.5)

## 2024-05-04 PROCEDURE — 85610 PROTHROMBIN TIME: CPT

## 2024-05-04 PROCEDURE — 93971 EXTREMITY STUDY: CPT

## 2024-05-04 PROCEDURE — 93971 EXTREMITY STUDY: CPT | Mod: 26,RT

## 2024-05-04 PROCEDURE — 85652 RBC SED RATE AUTOMATED: CPT

## 2024-05-04 PROCEDURE — 85730 THROMBOPLASTIN TIME PARTIAL: CPT

## 2024-05-04 PROCEDURE — 99285 EMERGENCY DEPT VISIT HI MDM: CPT

## 2024-05-04 PROCEDURE — 99285 EMERGENCY DEPT VISIT HI MDM: CPT | Mod: 25

## 2024-05-04 PROCEDURE — 86140 C-REACTIVE PROTEIN: CPT

## 2024-05-04 PROCEDURE — 87040 BLOOD CULTURE FOR BACTERIA: CPT

## 2024-05-04 PROCEDURE — 80048 BASIC METABOLIC PNL TOTAL CA: CPT

## 2024-05-04 PROCEDURE — 73562 X-RAY EXAM OF KNEE 3: CPT | Mod: 26,RT

## 2024-05-04 PROCEDURE — 73562 X-RAY EXAM OF KNEE 3: CPT

## 2024-05-04 PROCEDURE — 83605 ASSAY OF LACTIC ACID: CPT

## 2024-05-04 PROCEDURE — 36415 COLL VENOUS BLD VENIPUNCTURE: CPT

## 2024-05-04 PROCEDURE — 85025 COMPLETE CBC W/AUTO DIFF WBC: CPT

## 2024-05-04 NOTE — CONSULT NOTE ADULT - SUBJECTIVE AND OBJECTIVE BOX
Orthopaedic Surgery Consult Note    For Surgeon:  Gianni Hodges    HPI:  Mr Richardson is a 79M who is s/p R TKA on 4/29 who presents due to R foot blister seen on dorsum of R foot when he went to change his socks on 5/3 PM. denies any constitutional sxs such as f/c/n/v. States that apart from RLE swelling, R knee pain is well controlled with little pain during R knee ROM. Because he was having some drainage from the pin sites during his inpatient stay, Dr Hodges had recommended daily dressing changes with gauze/ABD/pam.         Vital Signs Last 24 Hrs  T(C): 37.2 (04 May 2024 06:22), Max: 37.6 (04 May 2024 02:17)  T(F): 98.9 (04 May 2024 06:22), Max: 99.6 (04 May 2024 02:17)  HR: 84 (04 May 2024 08:50) (71 - 84)  BP: 106/87 (04 May 2024 08:50) (106/87 - 117/65)  BP(mean): --  RR: 19 (04 May 2024 08:50) (16 - 19)  SpO2: 98% (04 May 2024 08:50) (97% - 99%)    Parameters below as of 04 May 2024 06:22  Patient On (Oxygen Delivery Method): room air        Physical Exam:  Focused exam RLE   prevena intact holding suction at knee   no blistering appreciated around prevena edges, no erythema appreciated at knee  no drainage appreciated at pin sites, nylon sutures in back  blisters present @ dorsum of R foot   no pain with ROM of R knee 0 - 90     Imaging:   XR R knee ap/lat demonstrates TKA implant in adequate position w/ no evidence of fx  Doppler RLE neg for VTE     A/P: 79M who is s/p R TKA on 4/29 who presents due to R foot blister seen on dorsum of R foot when he went to change his socks on 5/3 PM. His blisters are likely 2/2 postoperative swelling from his recent TKA. The blisters were cleaned with clorhexidine and unroofed with the use of a sterile 15 blade. The blisters were dressed w/ gauze and the RLE was wrapped in ACE from toe to mid thigh. Additionally, prevena battery was changed. he should continue taking his xarelto for DVT PPX, continue taking PO abx, and he should f/u with Dr Hodges next Tuesday. In the interim should hold off on home PT and abide by rest,ice,elevation,compression of RLE     -Discussed with Dr. Carroll Myrick, PGY-2  Orthopedic Surgery

## 2024-05-04 NOTE — ED ADULT NURSE NOTE - CHIEF COMPLAINT QUOTE
"I had right knee replacement done and was discharged yesterday. My right leg is really swollen and I think it might be infected."

## 2024-05-04 NOTE — ED ADULT TRIAGE NOTE - CHIEF COMPLAINT QUOTE
NURSING, PT OT AND SPEECH    [facility] calling to see if Dr. Shabazz would manage home care orders for Jovani. He is needing nursing, PT, OT and Speech. Please advise.    
Yes, Dr Shabazz will. [facility] called today also and I talked to a nurse and let them know. I asked them to fax over discharge notes when he is discharged so that we can set up a follow up appt.    
"I had right knee replacement done and was discharged yesterday. My right leg is really swollen and I think it might be infected."

## 2024-05-04 NOTE — ED PROVIDER NOTE - PATIENT PORTAL LINK FT
You can access the FollowMyHealth Patient Portal offered by [facility] by registering at the following website: [email or web address]. By joining [employer]’s FollowMyHealth portal, you will also be able to view your health information using other applications (apps) compatible with our system.

## 2024-05-04 NOTE — ED PROVIDER NOTE - PROGRESS NOTE DETAILS
no DVT, pending ortho recs Director - pt received from night team pending ortho recs.  VS, labs, imaging reviewed.  Pt resting comfortably. pt evaluated by ortho, dressing changed, no other intervention needed per ortho, pt can be d/c to f/u in office

## 2024-05-04 NOTE — ED PROVIDER NOTE - NSTIMEPROVIDERCAREINITIATE_GEN_ER
WBC down and absolute lymphocytes back to normal. Rest of CBC is normal as well. 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 Refer to hematologist for evaluation and management as indicated.    Cyclic neutropenia is a rare blood disorder characterized by recurrent episodes of abnormally low levels of certain white blood cells (neutrophils) in the body. Neutrophils are instrumental in fighting off infection by surrounding and destroying bacteria that enter the body. Symptoms associated with cyclic neutropenia may include fever, a general feeling of ill health (malaise), and/or sores (ulcers) of the mucous membranes of the mouth. In most cases, individuals with low levels of neutrophils (neutropenia) are abnormally susceptible to recurrent infections.   04-May-2024 01:36

## 2024-05-04 NOTE — ED ADULT NURSE NOTE - JUGULAR VENOUS DISTENTION
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   absent

## 2024-05-04 NOTE — ED PROVIDER NOTE - PHYSICAL EXAMINATION
RLE - wound vanc overlying anterior right knee. inner thigh - warm with erythema, compartments soft  +swelling to distal leg, 2+dp, serous blisters to lateral distal top of right foot. proximal plantar foot - ecchymosis.   2 sutures distal to wound vac - with occasional blood oozing

## 2024-05-04 NOTE — ED CLERICAL - NS ED CARE COORDINATION INFORMATION
This patient is enrolled in the comprehensive joint replacement (CJR) program and has active care navigation.  This patient can be followed up by the care navigation team within 24 hours. To arrange close follow-up or to obtain additional clinical information about this patient, please call the contact number above. Please call the orthopedic resident on call ([phone]) for ALL patients who may be admitted.

## 2024-05-04 NOTE — ED PROVIDER NOTE - NSICDXPASTSURGICALHX_GEN_ALL_CORE_FT
PAST SURGICAL HISTORY:  Breast lump removed    Elective surgery colon polyps removal    Elective surgery prostate ablation    Elective surgery ivc filter1/14/2020    H/O hernia repair     H/O total knee replacement, right     History of carpal tunnel release of both wrists     History of coronary artery stent placement x 5 stents    History of shoulder surgery left    History of strabismus surgery     History of total knee replacement, left     History of vasectomy     S/P angioplasty

## 2024-05-04 NOTE — ED PROVIDER NOTE - OBJECTIVE STATEMENT
79M hx DVT (on xarelto), cad (stent), htn, s/p R knee replacement 4/29 c/o right leg swelling. pt states was discharged home 5/2. pt has wound vac overlying right knee. pt states noticed increased swelling to right leg yesterday. states blistering to top of right foot and bruising to bottom. pt also with oozing from sutures. pt states has been walking on leg. states restarted on his xarelto while he was admitted.

## 2024-05-04 NOTE — ED PROVIDER NOTE - CLINICAL SUMMARY MEDICAL DECISION MAKING FREE TEXT BOX
s/p R knee replacement, with leg swelling. afebrile currently, possible cellulitis, pt states on ABX post-op   -check US to r/o dvt  -check labs, cultures   -ortho consulted  pt declined pain meds at this time

## 2024-05-04 NOTE — ED ADULT NURSE NOTE - NSFALLRISKINTERV_ED_ALL_ED

## 2024-05-04 NOTE — ED ADULT NURSE REASSESSMENT NOTE - NS ED NURSE REASSESS COMMENT FT1
received report from night RN. pt awake a/ox3 ortho at bedside. safety maintained, all needs met. received report from night RN. pt awake a/ox3 ortho at bedside changed prevena dressing and ace. safety maintained, all needs met.

## 2024-05-04 NOTE — ED ADULT NURSE NOTE - OBJECTIVE STATEMENT
Pt presents post knee replacement on 4/29, dcd yesterday from inpatient (5/2). Seen by PT at home on 5/3, observed leg with swelling which pt states he was told "normal" for post-op. Pt denies any increase in pain, but observed that swelling worsened after physical therapy and approx 4-5 hours later, removed sock and noted fluid filled blisters to toes. Pt states that he also had telephone conference with Dr. Hodges while PT was at his home, physician able to visualize leg and did not seem concerned of the noted swelling as per pt report. Upon arrival, rt knee and rt lower extremity swollen and warm to touch, denies any changes in pain level. Denies any fevers. Pt has wound vac in use with drain.

## 2024-05-04 NOTE — ED PROVIDER NOTE - NSFOLLOWUPINSTRUCTIONS_ED_ALL_ED_FT
Leg Edema    WHAT YOU NEED TO KNOW:    Leg edema is swelling caused by fluid buildup. Your legs may swell if you sit or stand for long periods of time, are pregnant, or are injured. Swelling may also occur if you have heart failure or circulation problems. This means that your heart does not pump blood through your body as it should.    DISCHARGE INSTRUCTIONS:    Call your local emergency number (911 in the ) for any of the following:    You cannot walk.    You have chest pain or trouble breathing that is worse when you lie down.    You suddenly feel lightheaded and have trouble breathing.    You have new and sudden chest pain. You may have more pain when you take deep breaths or cough.    You cough up blood.  Return to the emergency department if:    You feel faint or confused.    Your skin turns blue or gray.    Your leg feels warm, tender, and painful. It may be swollen and red.  Call your doctor if:    You have a fever or feel more tired than usual.    The veins in your legs look larger than usual. They may look full or bulging.    Your legs itch or feel heavy.    You have red or white areas or sores on your legs. The skin may also appear dimpled or have indentations.    You are gaining weight.    You have trouble moving your ankles.    The swelling does not go away, or other parts of your body swell.    You have questions or concerns about your condition or care.  Self-care:    Elevate your legs. Raise your legs above the level of your heart as often as you can. This will help decrease swelling and pain. Prop your legs on pillows or blankets to keep them elevated comfortably.    Wear pressure stockings, if directed. These tight stockings put pressure on your legs to promote blood flow and prevent blood clots. Put them on before you get out of bed. Wear the stockings during the day. Do not wear them while you sleep.    Stay active. Do not stand or sit for long periods of time. Ask your healthcare provider about the best exercise plan for you.    Eat healthy foods. Healthy foods include fruits, vegetables, whole-grain breads, low-fat dairy products, beans, lean meats, and fish. Ask if you need to be on a special diet.    Limit sodium (salt). Salt will make your body hold even more fluid. Your healthcare provider will tell you how many milligrams (mg) of salt you can have each day.    Follow up with your doctor as directed: Write down your questions so you remember to ask them during your visits.    Precautions after Total Joint Replacement Surgery    WHAT YOU NEED TO KNOW:    Precautions after total joint replacement surgery are safety measures. Safety measures are guidelines to help your joint heal properly and help you avoid injury after surgery.    DISCHARGE INSTRUCTIONS:    Return to the emergency department if:    You fall.    You have severe pain.    One or both of your legs are red, warm, and swollen.  Call your doctor or surgeon if:    You develop bruises.    You have questions or concerns about your condition or care.  Weight on the new joint: Follow your healthcare provider's instructions on how much weight to put on your joint. You may need to use crutches, a cane, or a walker for a time after surgery. Your provider will increase the amount of weight you can put on the joint as you heal.    Climbing stairs: Climb stairs one at a time. Step up with the same foot each time. Do not switch feet for each stair. You may need to use crutches or a cane to help you go up or down stairs. Always follow safety measures for climbing stairs.    Body positioning:    Sit in chairs that have seats at least as high as your knees.    Use a special seat extender to raise your toilet seat at least as high as your knees.    Do not cross or twist your legs.    Do not bend at your waist to pick things up. Use a tool to help you grab objects.    Do not sit for longer than your healthcare provider recommends. He or she may tell you not to sit for longer than 30 minutes at a time.    Follow instructions about using pillows to help position your body.    Your healthcare provider may tell you to keep 2 pillows between your legs while you sleep. He or she will also tell you if you need to avoid any sleeping positions, and how long to do this.  Activity: Ask your healthcare provider when you can return to your usual activities. Ask when you can start to exercise and which exercises are best for you. Avoid activities and exercise that cause joint pain. You may need to see a physical or occupational therapist. These therapists teach you how to safely move with your new joint. They teach you activities and exercises that help make your bones and muscles stronger.    Follow up with your doctor or surgeon as directed: Write down your questions so you remember to ask them during your visits.

## 2024-05-07 ENCOUNTER — APPOINTMENT (OUTPATIENT)
Dept: ORTHOPEDIC SURGERY | Facility: CLINIC | Age: 79
End: 2024-05-07
Payer: MEDICARE

## 2024-05-07 VITALS
HEIGHT: 70 IN | SYSTOLIC BLOOD PRESSURE: 122 MMHG | HEART RATE: 68 BPM | BODY MASS INDEX: 27.92 KG/M2 | WEIGHT: 195 LBS | DIASTOLIC BLOOD PRESSURE: 60 MMHG | OXYGEN SATURATION: 96 %

## 2024-05-07 PROCEDURE — 99024 POSTOP FOLLOW-UP VISIT: CPT

## 2024-05-07 RX ORDER — CEFADROXIL 500 MG/1
500 CAPSULE ORAL TWICE DAILY
Qty: 20 | Refills: 0 | Status: ACTIVE | COMMUNITY
Start: 2024-04-26 | End: 1900-01-01

## 2024-05-08 DIAGNOSIS — I95.1 ORTHOSTATIC HYPOTENSION: ICD-10-CM

## 2024-05-08 DIAGNOSIS — Z86.718 PERSONAL HISTORY OF OTHER VENOUS THROMBOSIS AND EMBOLISM: ICD-10-CM

## 2024-05-08 DIAGNOSIS — M17.11 UNILATERAL PRIMARY OSTEOARTHRITIS, RIGHT KNEE: ICD-10-CM

## 2024-05-08 DIAGNOSIS — I25.10 ATHEROSCLEROTIC HEART DISEASE OF NATIVE CORONARY ARTERY WITHOUT ANGINA PECTORIS: ICD-10-CM

## 2024-05-08 DIAGNOSIS — G62.9 POLYNEUROPATHY, UNSPECIFIED: ICD-10-CM

## 2024-05-08 DIAGNOSIS — I10 ESSENTIAL (PRIMARY) HYPERTENSION: ICD-10-CM

## 2024-05-08 DIAGNOSIS — Z95.5 PRESENCE OF CORONARY ANGIOPLASTY IMPLANT AND GRAFT: ICD-10-CM

## 2024-05-08 DIAGNOSIS — G47.33 OBSTRUCTIVE SLEEP APNEA (ADULT) (PEDIATRIC): ICD-10-CM

## 2024-05-09 LAB
CULTURE RESULTS: SIGNIFICANT CHANGE UP
CULTURE RESULTS: SIGNIFICANT CHANGE UP
SPECIMEN SOURCE: SIGNIFICANT CHANGE UP
SPECIMEN SOURCE: SIGNIFICANT CHANGE UP

## 2024-05-13 ENCOUNTER — APPOINTMENT (OUTPATIENT)
Dept: ORTHOPEDIC SURGERY | Facility: CLINIC | Age: 79
End: 2024-05-13

## 2024-05-13 NOTE — HISTORY OF PRESENT ILLNESS
[de-identified] : first post -op right total knee replacement surgical date 4-29-24  [de-identified] : 05/07/2024: 79 year old male presenting for first post op visit following R TKA performed 4/29/24. He had an initially unremarkable postop course; however, he developed severe swelling of his right leg, extending below the knee with associated blistering of the dorsum of the foot, following his procedure. He has remained on Xarelto since his operation has been experiencing ongoing draining of serosanguinous fluid from his pin sites. He is concerned about the degree of swelling that he has been experiencing. He did come to the ER a few days ago. He is continuing with cefadroxil which was given to him at initial discharge following the R TKA. [de-identified] : General appearance: well nourished and hydrated, pleasant, alert and oriented x 3, cooperative.   HEENT: normocephalic, EOM intact, wearing mask, external auditory canal clear.   Cardiovascular: no lower leg edema, no varicosities, dorsalis pedis pulses palpable and symmetric.   Lymphatics: no palpable lymphadenopathy,  diffuse 2+ pitting edema Neurologic: sensation is normal, no muscle weakness in upper or lower extremities, patella tendon reflexes present and symmetric.   Dermatologic: serous blister formation on dorsum of foot and along the base of all toes, as well as a decompressed blister adjacent to the lateral malleolus Spine: cervical spine with normal lordosis and painless range of motion, thoracic spine with normal kyphosis and painless range of motion, lumbosacral spine with normal lordosis and painless range of motion.  No tenderness to palpation along midline spine and paraspinal musculature.  Sacroiliac joints nontender bilaterally. Negative SLR and crossed SLR tests bilaterally. Gait: right-sided caution.    Right knee: There is profound drainage from the wound from multiple areas with no purulence; no cellulitis; multiple decompressed blood blisters; spontaneous serosanguinous drainage from tibial pin site array [de-identified] : Imp: 79 year old male s/p R TKA with marked postoperative swelling and blister formation with drainage along the tibial pin sites array. [de-identified] : - I decompressed the wound with chlorhexidine and reapplied dressing - All his blisters were unroofed and expressed. New gauze was placed on pin sites and blisters, and ACE bandage was applied from knees to toes - I instructed him to discontinue Xarelto; he may follow up with cardiology regarding possible initiation of diuretics - I instructed him to hold PT and employ relative rest for the time being with limb elevation and cryotherapy - Continue daily wound care with Silvadene, gauze, and ACE bandages - Continue with cefadroxil for coverage - He will return next week for wound check and dressing change, he will likely continue followup for at least 3 weeks postoperatively.

## 2024-05-13 NOTE — ADDENDUM
[FreeTextEntry1] : I, Henry Medrano, documented this note as a scribe on behalf of Dr. Gianni Hodges on 05/07/2024.

## 2024-05-13 NOTE — END OF VISIT
[FreeTextEntry3] : All medical record entries made by the Scribe were at my, Dr. Gianni Hodges, direction and personally dictated by me on 05/07/2024. I have reviewed the chart and agree that the record accurately reflects my personal performance of the history, physical exam, assessment and plan. I have also personally directed, reviewed, and agreed with the chart.

## 2024-05-14 ENCOUNTER — OUTPATIENT (OUTPATIENT)
Dept: OUTPATIENT SERVICES | Facility: HOSPITAL | Age: 79
LOS: 1 days | End: 2024-05-14
Payer: MEDICARE

## 2024-05-14 ENCOUNTER — RESULT REVIEW (OUTPATIENT)
Age: 79
End: 2024-05-14

## 2024-05-14 ENCOUNTER — APPOINTMENT (OUTPATIENT)
Dept: ORTHOPEDIC SURGERY | Facility: CLINIC | Age: 79
End: 2024-05-14
Payer: MEDICARE

## 2024-05-14 VITALS
BODY MASS INDEX: 27.92 KG/M2 | WEIGHT: 195 LBS | HEART RATE: 55 BPM | SYSTOLIC BLOOD PRESSURE: 152 MMHG | HEIGHT: 70 IN | OXYGEN SATURATION: 97 % | DIASTOLIC BLOOD PRESSURE: 71 MMHG

## 2024-05-14 DIAGNOSIS — Z98.890 OTHER SPECIFIED POSTPROCEDURAL STATES: Chronic | ICD-10-CM

## 2024-05-14 DIAGNOSIS — Z96.652 PRESENCE OF LEFT ARTIFICIAL KNEE JOINT: Chronic | ICD-10-CM

## 2024-05-14 DIAGNOSIS — N63.0 UNSPECIFIED LUMP IN UNSPECIFIED BREAST: Chronic | ICD-10-CM

## 2024-05-14 DIAGNOSIS — Z95.5 PRESENCE OF CORONARY ANGIOPLASTY IMPLANT AND GRAFT: Chronic | ICD-10-CM

## 2024-05-14 DIAGNOSIS — Z41.9 ENCOUNTER FOR PROCEDURE FOR PURPOSES OTHER THAN REMEDYING HEALTH STATE, UNSPECIFIED: Chronic | ICD-10-CM

## 2024-05-14 DIAGNOSIS — Z98.89 OTHER SPECIFIED POSTPROCEDURAL STATES: Chronic | ICD-10-CM

## 2024-05-14 DIAGNOSIS — Z98.52 VASECTOMY STATUS: Chronic | ICD-10-CM

## 2024-05-14 DIAGNOSIS — M17.11 UNILATERAL PRIMARY OSTEOARTHRITIS, RIGHT KNEE: ICD-10-CM

## 2024-05-14 PROCEDURE — 73564 X-RAY EXAM KNEE 4 OR MORE: CPT

## 2024-05-14 PROCEDURE — 73564 X-RAY EXAM KNEE 4 OR MORE: CPT | Mod: 26,RT

## 2024-05-14 PROCEDURE — 99024 POSTOP FOLLOW-UP VISIT: CPT

## 2024-05-16 NOTE — HISTORY OF PRESENT ILLNESS
[de-identified] : Second post operative visit for right total knee replacement, surgical date was 4-29-24. Patient is 2-week status post.  [de-identified] : 05/14/2024: 79 year old male here for 2 week post-op visit following up for wound check given greater than average post-op swelling and formation of post-op right lower limb blistering. Pt has been continuing to comply with oral antibiotics and local wound care. He notes improvement of the swelling and blistering, but worsening of his eczema. Pt is continuing to take Tylenol and tramadol for pain as needed. Has been using Lasix from his cardiologist and holding Xarelto per our prior instructions. [de-identified] : Gait: Presents with the use of a rolling walker. Demonstrates appropriate caution for this stage in recovery.    Right knee: - Focal soft tissue swelling: mild diffuse; significantly improved from prior visit - Ecchymosis: none - Erythema: none - Effusion: moderate residual - Wounds: The right knee midline incision is well-approximated and the staples are in place. There are some spotty areas of bloody blistering on the lateral sides of the incision but none contiguous with the incision. There is a drop of sanguinous drainage upon expression of the wound. The tibial stab incisions are well- healed, and there is no expressible drainage. The blistering noted at right dorsal foot and lateral malleolus has decompressed, and the overall degree of limb swelling has improved markedly. There is no new blistering present. There are some eczematous skin changes with no active cellulitis or new open wounds. - Alignment: normal - Tenderness: none - ROM: 0-95 - Collateral laxity: none - Cruciate laxity: none - Popliteal angle (degrees): deferred - Quad strength: 5/5 [de-identified] : Right knee XRs were taken today 05/14/2024 at [facility]:  Right knee -- Stable appearance of a right total knee arthroplasty as compared to prior imaging without evidence of mechanical complication. Patellar height: normal Patellar tracking: central  [de-identified] : IMP: 79 year old male now about 2 weeks s/p right total knee arthroplasty with improving post operative swelling and blistering [de-identified] : - He has made a very good clinical improvement from last visit - I cleared him to resume Xarelto today, and he will discuss further management of diuretics with his cardiologist - He will finish out the current course of oral antibiotics - I provided him with a referral to outpatient PT. I cleared him to resume home PT today, and he will transition to outpatient once home PT is completed.  - He doesn't require further local wound care for foot blisters, but he can continue using topical Silvadene on the blisters about the knee. - He can resume topical eczema treatment, but I advised him keep any topical steroid creams away from the surgical incision.  - He will follow up in 1 week with no new XRs needed for staple removal from midline incision.  - We discussed any symptoms that would necessitate a call to our office or earlier visit.

## 2024-05-16 NOTE — ADDENDUM
[FreeTextEntry1] : I, Maye Kidd, documented this note as a scribe on behalf of Dr. Gianni Hodges on 05/14/2024.

## 2024-05-16 NOTE — END OF VISIT
[FreeTextEntry3] : All medical record entries made by the Scribe were at my, Dr. Gianni Hodges, direction and personally dictated by me on 05/14/2024. I have reviewed the chart and agree that the record accurately reflects my personal performance of the history, physical exam, assessment and plan. I have alsopersonally directed, reviewed, and agreed with the chart.

## 2024-05-21 ENCOUNTER — APPOINTMENT (OUTPATIENT)
Dept: ORTHOPEDIC SURGERY | Facility: CLINIC | Age: 79
End: 2024-05-21
Payer: MEDICARE

## 2024-05-21 VITALS
DIASTOLIC BLOOD PRESSURE: 71 MMHG | HEART RATE: 55 BPM | OXYGEN SATURATION: 98 % | HEIGHT: 70 IN | WEIGHT: 195 LBS | SYSTOLIC BLOOD PRESSURE: 141 MMHG | BODY MASS INDEX: 27.92 KG/M2

## 2024-05-21 PROCEDURE — 99024 POSTOP FOLLOW-UP VISIT: CPT

## 2024-05-22 ENCOUNTER — TRANSCRIPTION ENCOUNTER (OUTPATIENT)
Age: 79
End: 2024-05-22

## 2024-05-23 NOTE — HISTORY OF PRESENT ILLNESS
[de-identified] : Third post operative visit for right total knee replacement, surgical date was 4-29-24. Patient is three-week status post.   [de-identified] : 05/21/2024: 79 year old male presenting for his third post-operative visit 3 weeks s/p right total knee arthroplasty. He is following up today primarily for staple removal. He finished his oral antibiotics on schedule. He has had no drainage from the incision site. He reports that his pain is well-controlled. He finished home PT and was waiting for my clearence for outpatient PT.  [de-identified] : Gait: Presents using a small rollator.    Right knee: - Focal soft tissue swelling: none - Ecchymosis: none - Erythema: none - Effusion: none - Wounds: well-approximated midline incision with a long bettina-incisional scab on the medial 80%. The staples were removed, and there was a little greater than typical bleeding post staple removal. The wound was covered with steristrips. His distal limb swelling has somewhat improved. There has been no worsening of the foot blisters, and they appear to be decompressed at this time. - Alignment: normal - Tenderness: none - ROM: 0-125 - Collateral laxity: none - Cruciate laxity: none - Popliteal angle (degrees): deferred - Quad strength: 5/5 [de-identified] : IMP: 79 year old male now 3 weeks s/p right total knee arthroplasty with resolved post operative blistering and greatly improved post operative swelling  [de-identified] : - He does not require any further systemic antibiotics.  - He will begin outpatient PT as soon as possible, and I cleared him to do so immediately. - He will follow up in 4 weeks with repeat right knee XRs.

## 2024-05-23 NOTE — ADDENDUM
[FreeTextEntry1] : I, Maye Kidd, documented this note as a scribe on behalf of Dr. Gianni Hodges on 05/21/2024.

## 2024-05-23 NOTE — END OF VISIT
[FreeTextEntry3] : All medical record entries made by the Scribe were at my, Dr. Gianni Hodges, direction and personally dictated by me on 05/21/2024. I have reviewed the chart and agree that the record accurately reflects my personal performance of the history, physical exam, assessment and plan. I have alsopersonally directed, reviewed, and agreed with the chart.

## 2024-05-28 ENCOUNTER — APPOINTMENT (OUTPATIENT)
Dept: ORTHOPEDIC SURGERY | Facility: CLINIC | Age: 79
End: 2024-05-28

## 2024-05-28 VITALS — BODY MASS INDEX: 27.92 KG/M2 | WEIGHT: 195 LBS | HEIGHT: 70 IN | RESPIRATION RATE: 16 BRPM

## 2024-05-28 DIAGNOSIS — M75.101 UNSPECIFIED ROTATOR CUFF TEAR OR RUPTURE OF RIGHT SHOULDER, NOT SPECIFIED AS TRAUMATIC: ICD-10-CM

## 2024-05-28 PROCEDURE — 99212 OFFICE O/P EST SF 10 MIN: CPT | Mod: 24

## 2024-05-29 PROBLEM — M75.101 ROTATOR CUFF TEAR, RIGHT: Status: ACTIVE | Noted: 2023-07-27

## 2024-06-04 ENCOUNTER — TRANSCRIPTION ENCOUNTER (OUTPATIENT)
Age: 79
End: 2024-06-04

## 2024-06-05 ENCOUNTER — NON-APPOINTMENT (OUTPATIENT)
Age: 79
End: 2024-06-05

## 2024-06-17 ENCOUNTER — APPOINTMENT (OUTPATIENT)
Dept: ORTHOPEDIC SURGERY | Facility: CLINIC | Age: 79
End: 2024-06-17
Payer: MEDICARE

## 2024-06-17 ENCOUNTER — OUTPATIENT (OUTPATIENT)
Dept: OUTPATIENT SERVICES | Facility: HOSPITAL | Age: 79
LOS: 1 days | End: 2024-06-17
Payer: MEDICARE

## 2024-06-17 ENCOUNTER — NON-APPOINTMENT (OUTPATIENT)
Age: 79
End: 2024-06-17

## 2024-06-17 ENCOUNTER — RESULT REVIEW (OUTPATIENT)
Age: 79
End: 2024-06-17

## 2024-06-17 VITALS
SYSTOLIC BLOOD PRESSURE: 107 MMHG | OXYGEN SATURATION: 97 % | HEIGHT: 70 IN | HEART RATE: 55 BPM | BODY MASS INDEX: 27.92 KG/M2 | DIASTOLIC BLOOD PRESSURE: 58 MMHG | WEIGHT: 195 LBS

## 2024-06-17 DIAGNOSIS — Z96.651 AFTERCARE FOLLOWING JOINT REPLACEMENT SURGERY: ICD-10-CM

## 2024-06-17 DIAGNOSIS — N63.0 UNSPECIFIED LUMP IN UNSPECIFIED BREAST: Chronic | ICD-10-CM

## 2024-06-17 DIAGNOSIS — Z96.651 PRESENCE OF RIGHT ARTIFICIAL KNEE JOINT: Chronic | ICD-10-CM

## 2024-06-17 DIAGNOSIS — Z98.52 VASECTOMY STATUS: Chronic | ICD-10-CM

## 2024-06-17 DIAGNOSIS — Z98.890 OTHER SPECIFIED POSTPROCEDURAL STATES: Chronic | ICD-10-CM

## 2024-06-17 DIAGNOSIS — Z41.9 ENCOUNTER FOR PROCEDURE FOR PURPOSES OTHER THAN REMEDYING HEALTH STATE, UNSPECIFIED: Chronic | ICD-10-CM

## 2024-06-17 DIAGNOSIS — Z47.1 AFTERCARE FOLLOWING JOINT REPLACEMENT SURGERY: ICD-10-CM

## 2024-06-17 DIAGNOSIS — Z96.652 PRESENCE OF LEFT ARTIFICIAL KNEE JOINT: Chronic | ICD-10-CM

## 2024-06-17 PROCEDURE — 99024 POSTOP FOLLOW-UP VISIT: CPT

## 2024-06-17 PROCEDURE — 73562 X-RAY EXAM OF KNEE 3: CPT | Mod: 26,RT

## 2024-06-17 PROCEDURE — 73562 X-RAY EXAM OF KNEE 3: CPT

## 2024-06-18 ENCOUNTER — APPOINTMENT (OUTPATIENT)
Dept: NEUROLOGY | Facility: CLINIC | Age: 79
End: 2024-06-18
Payer: MEDICARE

## 2024-06-18 VITALS
TEMPERATURE: 97.3 F | OXYGEN SATURATION: 97 % | HEIGHT: 70 IN | SYSTOLIC BLOOD PRESSURE: 99 MMHG | BODY MASS INDEX: 27.92 KG/M2 | WEIGHT: 195 LBS | DIASTOLIC BLOOD PRESSURE: 59 MMHG | HEART RATE: 65 BPM

## 2024-06-18 PROCEDURE — 99214 OFFICE O/P EST MOD 30 MIN: CPT

## 2024-06-18 RX ORDER — GABAPENTIN 600 MG/1
600 TABLET, COATED ORAL
Refills: 0 | Status: ACTIVE | COMMUNITY

## 2024-06-18 RX ORDER — RAMIPRIL 1.25 MG/1
1.25 CAPSULE ORAL
Refills: 0 | Status: ACTIVE | COMMUNITY

## 2024-06-18 RX ORDER — FUROSEMIDE 80 MG/1
TABLET ORAL
Refills: 0 | Status: ACTIVE | COMMUNITY

## 2024-06-18 NOTE — PHYSICAL EXAM
[Person] : oriented to person [Place] : oriented to place [Time] : oriented to time [Concentration Intact] : normal concentrating ability [Visual Intact] : visual attention was ~T not ~L decreased [Naming Objects] : no difficulty naming common objects [Repeating Phrases] : no difficulty repeating a phrase [Writing A Sentence] : no difficulty writing a sentence [Fluency] : fluency intact [Comprehension] : comprehension intact [Reading] : reading intact [Past History] : adequate knowledge of personal past history [Cranial Nerves Optic (II)] : visual acuity intact bilaterally,  visual fields full to confrontation, pupils equal round and reactive to light [Cranial Nerves Oculomotor (III)] : extraocular motion intact [Cranial Nerves Trigeminal (V)] : facial sensation intact symmetrically [Cranial Nerves Facial (VII)] : face symmetrical [Cranial Nerves Vestibulocochlear (VIII)] : hearing was intact bilaterally [Cranial Nerves Glossopharyngeal (IX)] : tongue and palate midline [Cranial Nerves Accessory (XI - Cranial And Spinal)] : head turning and shoulder shrug symmetric [Cranial Nerves Hypoglossal (XII)] : there was no tongue deviation with protrusion [Motor Tone] : muscle tone was normal in all four extremities [Motor Strength] : muscle strength was normal in all four extremities [No Muscle Atrophy] : normal bulk in all four extremities [Paresis Pronator Drift Right-Sided] : no pronator drift on the right [Motor Strength Upper Extremities Bilaterally] : strength was normal in both upper extremities [Motor Strength Lower Extremities Bilaterally] : there was weakness in both lower extremities [Sensation Tactile Decrease] : light touch was intact [Romberg's Sign] : a positive Romberg's sign was present [Vibration Decrease Leg / Foot Both Ankles] : decreased at both ankles [Vibration Decrease Leg / Foot Toes Both Feet] : decreased at the toes of both feet  [Abnormal Walk] : normal gait [Balance] : balance was intact [Past-pointing] : there was no past-pointing [Tremor] : no tremor present [2+] : Brachioradialis left 2+ [1+] : Patella left 1+ [0] : Ankle jerk left 0 [Plantar Reflex Right Only] : normal on the right [Plantar Reflex Left Only] : normal on the left [Sclera] : the sclera and conjunctiva were normal [PERRL With Normal Accommodation] : pupils were equal in size, round, reactive to light, with normal accommodation [Extraocular Movements] : extraocular movements were intact [Outer Ear] : the ears and nose were normal in appearance [Oropharynx] : the oropharynx was normal

## 2024-06-18 NOTE — HISTORY OF PRESENT ILLNESS
[FreeTextEntry1] : Progressive worsening of balance whose onset he links to spine surgery . He also has cold feet and tingling of the feet that makes it difficult to walk. He has noticed vertigo when he moves his head since the surgery He has had surgery for the right knee but continues to have balance problems.  He is using a walker.  He has discontinued cyclosporine after tapering it down.  He is currently using gabapentin 1800 mg a day and his symptoms appear to be controlled.  He was using cyclosporine for

## 2024-06-18 NOTE — DISCUSSION/SUMMARY
[FreeTextEntry1] : The abnormal encephalopathy test showing very low abnormal titers of STACY 6 antibody is not considered significant.  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  Recommend physical therapy and use of a walker.

## 2024-06-19 PROBLEM — Z47.1 AFTERCARE FOLLOWING RIGHT KNEE JOINT REPLACEMENT SURGERY: Status: ACTIVE | Noted: 2024-04-26

## 2024-06-19 NOTE — ADDENDUM
[FreeTextEntry1] : I, Maye Kidd, documented this note as a scribe on behalf of Dr. Gianni Hodges on 06/17/2024.

## 2024-06-19 NOTE — END OF VISIT
[FreeTextEntry3] : All medical record entries made by the Scribe were at my, Dr. Gianni Hodges, direction and personally dictated by me on 06/17/2024. I have reviewed the chart and agree that the record accurately reflects my personal performance of the history, physical exam, assessment and plan. I have alsopersonally directed, reviewed, and agreed with the chart.

## 2024-06-19 NOTE — HISTORY OF PRESENT ILLNESS
[de-identified] : Post operative visit for right total knee replacement, surgical date was 4-29-24. Patient is seven week status post. [de-identified] : 06/17/2024: 79 year old male presenting now about 11 week s/p right total knee arthroplasty, overall doing well. He denies any pain of his knee and states he is not taking any pain medication. He is participating in PT twice per week and also consistently HEP at the [facility]. He is inquiring about returning to swimming at this time. He ambulates with the use of a rollator, which he says is due to mainly his back as well as his peripheral neuropathy. He has no new complaints. He denies fever, chills, or any other systematic indications of infection.  [de-identified] : General appearance: well nourished and hydrated, pleasant, alert and oriented x 3, cooperative. HEENT: normocephalic, EOM intact, wearing mask, external auditory canal clear. Cardiovascular: no lower leg edema, no varicosities, dorsalis pedis pulses palpable and symmetric. Lymphatics: no palpable lymphadenopathy, no lymphedema. Neurologic: sensation is normal, no muscle weakness in upper or lower extremities, patella tendon reflexes present and symmetric. Dermatologic: skin moist, warm, no rash. Spine: cervical spine with normal lordosis and painless range of motion, thoracic spine with normal kyphosis and painless range of motion, lumbosacral spine with normal lordosis and painless range of motion.  No tenderness to palpation along midline spine and paraspinal musculature.  Sacroiliac joints nontender bilaterally. Negative SLR and crossed SLR tests bilaterally. Gait: Demonstrates a slightly cautious gait with the use of a rollator. .   Right knee: - Focal soft tissue swelling: none - Ecchymosis: none - Erythema: proximal one-quarter of  surgical wound  - Effusion: mild - Wounds: well healed anterior surgical incision benign appearing with medial scabbing  - Alignment: normal - Tenderness: none - ROM: 2-130 - Collateral laxity: none - Cruciate laxity: none - Popliteal angle (degrees): - Quad strength: 5/5 [de-identified] : XR right knee  Soft tissue swelling, joint effusion Redemonstration right knee total arthroplasty in satisfactory position with no hardware complication. [de-identified] : IMP: 79 year old male now about 11 weeks s/p right total knee arthroplasty, overall doing well at this point.  [de-identified] : - I provided him with a new script for PT.  - I advised him to continue Tylenol as needed for pain.  - We will follow up annually in April with repeat right knee XRs, sooner as needed for any new or worsening symptoms.

## 2024-08-29 NOTE — ASSESSMENT
Patient notified and verbalized understanding.   [FreeTextEntry1] : 74 year old male with vertigo and unsteady gait \par \par  R/O cervical myelopathy- with or without CNS pathology

## 2024-10-28 DIAGNOSIS — G11.9 HEREDITARY ATAXIA, UNSPECIFIED: ICD-10-CM

## 2025-01-08 ENCOUNTER — APPOINTMENT (OUTPATIENT)
Dept: NEUROLOGY | Facility: CLINIC | Age: 80
End: 2025-01-08
Payer: MEDICARE

## 2025-01-08 VITALS
TEMPERATURE: 97.3 F | HEIGHT: 70 IN | SYSTOLIC BLOOD PRESSURE: 131 MMHG | HEART RATE: 68 BPM | DIASTOLIC BLOOD PRESSURE: 64 MMHG | OXYGEN SATURATION: 98 %

## 2025-01-08 DIAGNOSIS — G58.9 MONONEUROPATHY, UNSPECIFIED: ICD-10-CM

## 2025-01-08 PROCEDURE — 99214 OFFICE O/P EST MOD 30 MIN: CPT

## 2025-02-13 ENCOUNTER — APPOINTMENT (OUTPATIENT)
Dept: NEUROLOGY | Facility: CLINIC | Age: 80
End: 2025-02-13
Payer: MEDICARE

## 2025-02-13 PROCEDURE — 95886 MUSC TEST DONE W/N TEST COMP: CPT

## 2025-02-13 PROCEDURE — 95911 NRV CNDJ TEST 9-10 STUDIES: CPT

## 2025-02-13 PROCEDURE — 95885 MUSC TST DONE W/NERV TST LIM: CPT | Mod: 59

## 2025-03-14 ENCOUNTER — APPOINTMENT (OUTPATIENT)
Dept: NEUROLOGY | Facility: CLINIC | Age: 80
End: 2025-03-14

## 2025-03-14 ENCOUNTER — NON-APPOINTMENT (OUTPATIENT)
Age: 80
End: 2025-03-14

## 2025-03-14 ENCOUNTER — APPOINTMENT (OUTPATIENT)
Dept: NEUROLOGY | Facility: CLINIC | Age: 80
End: 2025-03-14
Payer: MEDICARE

## 2025-03-14 VITALS
HEIGHT: 70 IN | SYSTOLIC BLOOD PRESSURE: 131 MMHG | WEIGHT: 195 LBS | HEART RATE: 60 BPM | DIASTOLIC BLOOD PRESSURE: 67 MMHG | BODY MASS INDEX: 27.92 KG/M2 | TEMPERATURE: 97.3 F | OXYGEN SATURATION: 97 %

## 2025-03-14 DIAGNOSIS — J30.89 OTHER ALLERGIC RHINITIS: ICD-10-CM

## 2025-03-14 PROCEDURE — 99215 OFFICE O/P EST HI 40 MIN: CPT

## 2025-03-14 RX ORDER — AZELASTINE HYDROCHLORIDE 137 UG/1
137 SPRAY, METERED NASAL TWICE DAILY
Qty: 1 | Refills: 1 | Status: ACTIVE | COMMUNITY
Start: 2025-03-14 | End: 1900-01-01

## 2025-05-21 ENCOUNTER — NON-APPOINTMENT (OUTPATIENT)
Age: 80
End: 2025-05-21

## 2025-05-21 ENCOUNTER — APPOINTMENT (OUTPATIENT)
Age: 80
End: 2025-05-21
Payer: MEDICARE

## 2025-05-21 PROCEDURE — 92002 INTRM OPH EXAM NEW PATIENT: CPT

## 2025-06-18 ENCOUNTER — RESULT REVIEW (OUTPATIENT)
Age: 80
End: 2025-06-18

## 2025-06-18 ENCOUNTER — OUTPATIENT (OUTPATIENT)
Dept: OUTPATIENT SERVICES | Facility: HOSPITAL | Age: 80
LOS: 1 days | End: 2025-06-18
Payer: MEDICARE

## 2025-06-18 ENCOUNTER — APPOINTMENT (OUTPATIENT)
Dept: ORTHOPEDIC SURGERY | Facility: CLINIC | Age: 80
End: 2025-06-18
Payer: MEDICARE

## 2025-06-18 VITALS
TEMPERATURE: 96 F | HEART RATE: 74 BPM | HEIGHT: 70 IN | BODY MASS INDEX: 27.92 KG/M2 | OXYGEN SATURATION: 96 % | DIASTOLIC BLOOD PRESSURE: 73 MMHG | SYSTOLIC BLOOD PRESSURE: 119 MMHG | WEIGHT: 195 LBS

## 2025-06-18 DIAGNOSIS — Z98.890 OTHER SPECIFIED POSTPROCEDURAL STATES: Chronic | ICD-10-CM

## 2025-06-18 DIAGNOSIS — Z41.9 ENCOUNTER FOR PROCEDURE FOR PURPOSES OTHER THAN REMEDYING HEALTH STATE, UNSPECIFIED: Chronic | ICD-10-CM

## 2025-06-18 DIAGNOSIS — Z95.5 PRESENCE OF CORONARY ANGIOPLASTY IMPLANT AND GRAFT: Chronic | ICD-10-CM

## 2025-06-18 DIAGNOSIS — Z98.89 OTHER SPECIFIED POSTPROCEDURAL STATES: Chronic | ICD-10-CM

## 2025-06-18 DIAGNOSIS — Z96.651 PRESENCE OF RIGHT ARTIFICIAL KNEE JOINT: Chronic | ICD-10-CM

## 2025-06-18 DIAGNOSIS — N63.0 UNSPECIFIED LUMP IN UNSPECIFIED BREAST: Chronic | ICD-10-CM

## 2025-06-18 DIAGNOSIS — Z96.652 PRESENCE OF LEFT ARTIFICIAL KNEE JOINT: Chronic | ICD-10-CM

## 2025-06-18 PROCEDURE — 73564 X-RAY EXAM KNEE 4 OR MORE: CPT

## 2025-06-18 PROCEDURE — 99214 OFFICE O/P EST MOD 30 MIN: CPT

## 2025-06-18 PROCEDURE — 73564 X-RAY EXAM KNEE 4 OR MORE: CPT | Mod: 26,RT

## 2025-06-18 RX ORDER — DICLOFENAC SODIUM 10 MG/G
1 GEL TOPICAL
Qty: 100 | Refills: 2 | Status: ACTIVE | COMMUNITY
Start: 2025-06-18 | End: 1900-01-01

## 2025-06-18 RX ORDER — SILVER SULFADIAZINE 10 MG/G
1 CREAM TOPICAL TWICE DAILY
Qty: 1 | Refills: 1 | Status: ACTIVE | COMMUNITY
Start: 2025-06-18 | End: 1900-01-01

## 2025-07-07 ENCOUNTER — APPOINTMENT (OUTPATIENT)
Dept: ORTHOPEDIC SURGERY | Facility: CLINIC | Age: 80
End: 2025-07-07
Payer: MEDICARE

## 2025-07-07 VITALS — BODY MASS INDEX: 26.6 KG/M2 | WEIGHT: 190 LBS | HEIGHT: 71 IN

## 2025-07-07 PROCEDURE — 73080 X-RAY EXAM OF ELBOW: CPT | Mod: RT

## 2025-07-07 PROCEDURE — 20612 ASPIRATE/INJ GANGLION CYST: CPT | Mod: RT

## 2025-07-07 PROCEDURE — 99214 OFFICE O/P EST MOD 30 MIN: CPT | Mod: 25

## (undated) DEVICE — LAP PAD 18 X 18"

## (undated) DEVICE — SAW BLADE STRYKER SAGITTAL 81.5X12.5X1.19MM

## (undated) DEVICE — POLISHER OR CAUTERY TIP

## (undated) DEVICE — PREP DURAPREP 26CC

## (undated) DEVICE — POSITIONER FOAM EGG CRATE ULNAR 2PCS (PINK)

## (undated) DEVICE — SUT STRATAFIX SPIRAL PDS PLUS 0 23X23CM CP-2 VIOLET

## (undated) DEVICE — WOUND IRR IRRISEPT W 0.5 CHG

## (undated) DEVICE — SAW BLADE STRYKER SAGITTAL DUAL CUT TEETH 35X64X.64MM

## (undated) DEVICE — SUT VICRYL 2-0 27" CT-1 UNDYED

## (undated) DEVICE — DRAPE 1/2 SHEET 40X57"

## (undated) DEVICE — SAW SAGITTAL 2108 SERIES 20.5X1.27X85MM

## (undated) DEVICE — NDL HYPO SAFE 22G X 1.5" (BLACK)

## (undated) DEVICE — ROSA DRAPE ROBOTIC UNIT

## (undated) DEVICE — SUT STRATAFIX SPIRAL PDO 1 30CM OS-6

## (undated) DEVICE — VENODYNE/SCD SLEEVE CALF MEDIUM

## (undated) DEVICE — PREP CHLORAPREP HI-LITE ORANGE 26ML

## (undated) DEVICE — SUT STRATAFIX SPIRAL MONOCRYL PLUS 3-0 30CM PS-1 UNDYED

## (undated) DEVICE — ROSA NAVITRACKER KIT KNEE/SPINE

## (undated) DEVICE — PACK TOTAL KNEE

## (undated) DEVICE — WARMING BLANKET UPPER ADULT

## (undated) DEVICE — SUT VICRYL 0 36" CT-1 UNDYED

## (undated) DEVICE — SAW BLADE STRYKER SAGITTAL 25X86.5X1.32MM